# Patient Record
Sex: MALE | Employment: OTHER | URBAN - METROPOLITAN AREA
[De-identification: names, ages, dates, MRNs, and addresses within clinical notes are randomized per-mention and may not be internally consistent; named-entity substitution may affect disease eponyms.]

---

## 2017-01-01 ENCOUNTER — APPOINTMENT (OUTPATIENT)
Dept: GENERAL RADIOLOGY | Age: 82
DRG: 871 | End: 2017-01-01
Attending: EMERGENCY MEDICINE
Payer: MEDICARE

## 2017-01-01 ENCOUNTER — APPOINTMENT (OUTPATIENT)
Dept: GENERAL RADIOLOGY | Age: 82
DRG: 871 | End: 2017-01-01
Attending: SURGERY
Payer: MEDICARE

## 2017-01-01 ENCOUNTER — HOSPITAL ENCOUNTER (EMERGENCY)
Age: 82
Discharge: HOME | DRG: 291 | End: 2017-11-24
Attending: EMERGENCY MEDICINE
Payer: MEDICARE

## 2017-01-01 ENCOUNTER — APPOINTMENT (OUTPATIENT)
Dept: CT IMAGING | Age: 82
End: 2017-01-01
Attending: EMERGENCY MEDICINE
Payer: MEDICARE

## 2017-01-01 ENCOUNTER — APPOINTMENT (OUTPATIENT)
Dept: GENERAL RADIOLOGY | Age: 82
DRG: 871 | End: 2017-01-01
Attending: FAMILY MEDICINE
Payer: MEDICARE

## 2017-01-01 ENCOUNTER — APPOINTMENT (OUTPATIENT)
Dept: ULTRASOUND IMAGING | Age: 82
DRG: 291 | End: 2017-01-01
Attending: PHYSICIAN ASSISTANT
Payer: MEDICARE

## 2017-01-01 ENCOUNTER — APPOINTMENT (OUTPATIENT)
Dept: GENERAL RADIOLOGY | Age: 82
DRG: 304 | End: 2017-01-01
Attending: EMERGENCY MEDICINE
Payer: MEDICARE

## 2017-01-01 ENCOUNTER — APPOINTMENT (OUTPATIENT)
Dept: GENERAL RADIOLOGY | Age: 82
DRG: 291 | End: 2017-01-01
Attending: EMERGENCY MEDICINE
Payer: MEDICARE

## 2017-01-01 ENCOUNTER — APPOINTMENT (OUTPATIENT)
Dept: CT IMAGING | Age: 82
DRG: 291 | End: 2017-01-01
Attending: PHYSICIAN ASSISTANT
Payer: MEDICARE

## 2017-01-01 ENCOUNTER — HOSPITAL ENCOUNTER (EMERGENCY)
Age: 82
Discharge: OTHER INSTITUTION | End: 2017-11-18
Attending: EMERGENCY MEDICINE | Admitting: EMERGENCY MEDICINE
Payer: MEDICARE

## 2017-01-01 ENCOUNTER — TELEPHONE (OUTPATIENT)
Dept: FAMILY MEDICINE CLINIC | Age: 82
End: 2017-01-01

## 2017-01-01 ENCOUNTER — HOSPITAL ENCOUNTER (INPATIENT)
Age: 82
LOS: 1 days | Discharge: HOME | DRG: 304 | End: 2017-04-27
Attending: EMERGENCY MEDICINE | Admitting: FAMILY MEDICINE
Payer: MEDICARE

## 2017-01-01 ENCOUNTER — APPOINTMENT (OUTPATIENT)
Dept: GENERAL RADIOLOGY | Age: 82
DRG: 871 | End: 2017-01-01
Attending: INTERNAL MEDICINE
Payer: MEDICARE

## 2017-01-01 ENCOUNTER — APPOINTMENT (OUTPATIENT)
Dept: CT IMAGING | Age: 82
DRG: 871 | End: 2017-01-01
Attending: PHYSICIAN ASSISTANT
Payer: MEDICARE

## 2017-01-01 ENCOUNTER — HOSPITAL ENCOUNTER (INPATIENT)
Age: 82
LOS: 3 days | DRG: 871 | End: 2017-12-17
Attending: EMERGENCY MEDICINE | Admitting: FAMILY MEDICINE
Payer: MEDICARE

## 2017-01-01 ENCOUNTER — APPOINTMENT (OUTPATIENT)
Dept: GENERAL RADIOLOGY | Age: 82
End: 2017-01-01
Attending: EMERGENCY MEDICINE
Payer: MEDICARE

## 2017-01-01 ENCOUNTER — HOSPITAL ENCOUNTER (INPATIENT)
Age: 82
LOS: 6 days | Discharge: NURSING, REHAB OR LONG-TERM CARE | DRG: 291 | End: 2017-12-01
Attending: EMERGENCY MEDICINE | Admitting: FAMILY MEDICINE
Payer: MEDICARE

## 2017-01-01 ENCOUNTER — APPOINTMENT (OUTPATIENT)
Dept: CT IMAGING | Age: 82
DRG: 291 | End: 2017-01-01
Attending: EMERGENCY MEDICINE
Payer: MEDICARE

## 2017-01-01 VITALS
DIASTOLIC BLOOD PRESSURE: 138 MMHG | HEART RATE: 90 BPM | RESPIRATION RATE: 18 BRPM | HEIGHT: 65 IN | WEIGHT: 100 LBS | SYSTOLIC BLOOD PRESSURE: 179 MMHG | TEMPERATURE: 98.1 F | BODY MASS INDEX: 16.66 KG/M2 | OXYGEN SATURATION: 100 %

## 2017-01-01 VITALS
TEMPERATURE: 97.3 F | SYSTOLIC BLOOD PRESSURE: 123 MMHG | BODY MASS INDEX: 18.83 KG/M2 | DIASTOLIC BLOOD PRESSURE: 69 MMHG | OXYGEN SATURATION: 99 % | HEIGHT: 65 IN | RESPIRATION RATE: 24 BRPM | WEIGHT: 113 LBS | HEART RATE: 60 BPM

## 2017-01-01 VITALS
DIASTOLIC BLOOD PRESSURE: 22 MMHG | RESPIRATION RATE: 15 BRPM | OXYGEN SATURATION: 98 % | HEART RATE: 58 BPM | HEIGHT: 67 IN | SYSTOLIC BLOOD PRESSURE: 35 MMHG | BODY MASS INDEX: 20.35 KG/M2 | WEIGHT: 129.63 LBS | TEMPERATURE: 90.1 F

## 2017-01-01 VITALS
DIASTOLIC BLOOD PRESSURE: 82 MMHG | RESPIRATION RATE: 18 BRPM | HEART RATE: 69 BPM | SYSTOLIC BLOOD PRESSURE: 148 MMHG | HEIGHT: 65 IN | BODY MASS INDEX: 17.49 KG/M2 | WEIGHT: 105 LBS | TEMPERATURE: 98.2 F | OXYGEN SATURATION: 99 %

## 2017-01-01 VITALS
HEART RATE: 99 BPM | OXYGEN SATURATION: 99 % | HEIGHT: 65 IN | WEIGHT: 107.5 LBS | BODY MASS INDEX: 17.91 KG/M2 | RESPIRATION RATE: 18 BRPM | SYSTOLIC BLOOD PRESSURE: 146 MMHG | DIASTOLIC BLOOD PRESSURE: 96 MMHG | TEMPERATURE: 98 F

## 2017-01-01 DIAGNOSIS — R60.0 BILATERAL EDEMA OF LOWER EXTREMITY: Primary | ICD-10-CM

## 2017-01-01 DIAGNOSIS — T68.XXXA HYPOTHERMIA, INITIAL ENCOUNTER: ICD-10-CM

## 2017-01-01 DIAGNOSIS — R65.21 SEPTIC SHOCK (HCC): Primary | ICD-10-CM

## 2017-01-01 DIAGNOSIS — J18.9 PNEUMONIA OF BOTH LOWER LOBES DUE TO INFECTIOUS ORGANISM: ICD-10-CM

## 2017-01-01 DIAGNOSIS — R07.9 CHEST PAIN, UNSPECIFIED TYPE: Primary | ICD-10-CM

## 2017-01-01 DIAGNOSIS — R06.02 SOB (SHORTNESS OF BREATH): ICD-10-CM

## 2017-01-01 DIAGNOSIS — D49.0 HEPATIC TUMOR: Primary | ICD-10-CM

## 2017-01-01 DIAGNOSIS — D64.9 ANEMIA, UNSPECIFIED TYPE: ICD-10-CM

## 2017-01-01 DIAGNOSIS — A41.9 SEPTIC SHOCK (HCC): Primary | ICD-10-CM

## 2017-01-01 DIAGNOSIS — I21.09 ST ELEVATION MYOCARDIAL INFARCTION (STEMI) OF ANTERIOR WALL (HCC): ICD-10-CM

## 2017-01-01 DIAGNOSIS — R62.7 FAILURE TO THRIVE IN ADULT: ICD-10-CM

## 2017-01-01 DIAGNOSIS — R51.9 NONINTRACTABLE HEADACHE, UNSPECIFIED CHRONICITY PATTERN, UNSPECIFIED HEADACHE TYPE: ICD-10-CM

## 2017-01-01 DIAGNOSIS — R53.1 WEAKNESS: ICD-10-CM

## 2017-01-01 DIAGNOSIS — S36.119A HEPATIC TRAUMA, INITIAL ENCOUNTER: ICD-10-CM

## 2017-01-01 DIAGNOSIS — R77.8 TROPONIN I ABOVE REFERENCE RANGE: ICD-10-CM

## 2017-01-01 DIAGNOSIS — R16.0 LIVER MASS: ICD-10-CM

## 2017-01-01 DIAGNOSIS — R10.13 ABDOMINAL PAIN, EPIGASTRIC: Primary | ICD-10-CM

## 2017-01-01 LAB
25(OH)D3 SERPL-MCNC: 7.6 NG/ML (ref 30–100)
ABO + RH BLD: NORMAL
AFP-TM SERPL-MCNC: 1.9 NG/ML (ref 0–8.3)
ALBUMIN SERPL BCP-MCNC: 3.4 G/DL (ref 3.4–5)
ALBUMIN SERPL-MCNC: 1.6 G/DL (ref 3.4–5)
ALBUMIN SERPL-MCNC: 1.7 G/DL (ref 3.4–5)
ALBUMIN SERPL-MCNC: 1.9 G/DL (ref 3.4–5)
ALBUMIN SERPL-MCNC: 2.1 G/DL (ref 3.4–5)
ALBUMIN SERPL-MCNC: 2.1 G/DL (ref 3.4–5)
ALBUMIN SERPL-MCNC: 2.2 G/DL (ref 3.4–5)
ALBUMIN SERPL-MCNC: 2.2 G/DL (ref 3.4–5)
ALBUMIN SERPL-MCNC: 2.3 G/DL (ref 3.4–5)
ALBUMIN SERPL-MCNC: 2.5 G/DL (ref 3.4–5)
ALBUMIN SERPL-MCNC: 2.7 G/DL (ref 3.4–5)
ALBUMIN SERPL-MCNC: 2.9 G/DL (ref 3.4–5)
ALBUMIN/GLOB SERPL: 0.7 {RATIO} (ref 0.8–1.7)
ALBUMIN/GLOB SERPL: 0.8 {RATIO} (ref 0.8–1.7)
ALBUMIN/GLOB SERPL: 0.9 {RATIO} (ref 0.8–1.7)
ALP SERPL-CCNC: 1121 U/L (ref 45–117)
ALP SERPL-CCNC: 207 U/L (ref 45–117)
ALP SERPL-CCNC: 344 U/L (ref 45–117)
ALP SERPL-CCNC: 421 U/L (ref 45–117)
ALP SERPL-CCNC: 431 U/L (ref 45–117)
ALP SERPL-CCNC: 467 U/L (ref 45–117)
ALP SERPL-CCNC: 533 U/L (ref 45–117)
ALP SERPL-CCNC: 681 U/L (ref 45–117)
ALP SERPL-CCNC: 805 U/L (ref 45–117)
ALP SERPL-CCNC: 816 U/L (ref 45–117)
ALP SERPL-CCNC: 949 U/L (ref 45–117)
ALP SERPL-CCNC: 962 U/L (ref 45–117)
ALT SERPL-CCNC: 105 U/L (ref 16–61)
ALT SERPL-CCNC: 116 U/L (ref 16–61)
ALT SERPL-CCNC: 139 U/L (ref 16–61)
ALT SERPL-CCNC: 147 U/L (ref 16–61)
ALT SERPL-CCNC: 151 U/L (ref 16–61)
ALT SERPL-CCNC: 180 U/L (ref 16–61)
ALT SERPL-CCNC: 25 U/L (ref 16–61)
ALT SERPL-CCNC: 78 U/L (ref 16–61)
ALT SERPL-CCNC: 89 U/L (ref 16–61)
ALT SERPL-CCNC: 90 U/L (ref 16–61)
ALT SERPL-CCNC: 93 U/L (ref 16–61)
ALT SERPL-CCNC: 95 U/L (ref 16–61)
AMMONIA PLAS-SCNC: 12 UMOL/L (ref 11–32)
AMMONIA PLAS-SCNC: 29 UMOL/L (ref 11–32)
AMMONIA PLAS-SCNC: 61 UMOL/L (ref 11–32)
AMORPH CRY URNS QL MICRO: ABNORMAL
AMPHET UR QL SCN: NEGATIVE
ANION GAP BLD CALC-SCNC: 7 MMOL/L (ref 3–18)
ANION GAP BLD CALC-SCNC: 8 MMOL/L (ref 3–18)
ANION GAP BLD CALC-SCNC: 8 MMOL/L (ref 3–18)
ANION GAP SERPL CALC-SCNC: 11 MMOL/L (ref 3–18)
ANION GAP SERPL CALC-SCNC: 16 MMOL/L (ref 3–18)
ANION GAP SERPL CALC-SCNC: 18 MMOL/L (ref 3–18)
ANION GAP SERPL CALC-SCNC: 18 MMOL/L (ref 3–18)
ANION GAP SERPL CALC-SCNC: 19 MMOL/L (ref 3–18)
ANION GAP SERPL CALC-SCNC: 19 MMOL/L (ref 3–18)
ANION GAP SERPL CALC-SCNC: 21 MMOL/L (ref 3–18)
ANION GAP SERPL CALC-SCNC: 9 MMOL/L (ref 3–18)
APPEARANCE FLD: ABNORMAL
APPEARANCE UR: ABNORMAL
APPEARANCE UR: CLEAR
APTT PPP: 28.7 SEC (ref 23–36.4)
APTT PPP: 36.3 SEC (ref 23–36.4)
APTT PPP: 37.3 SEC (ref 23–36.4)
AST SERPL W P-5'-P-CCNC: 24 U/L (ref 15–37)
AST SERPL-CCNC: 147 U/L (ref 15–37)
AST SERPL-CCNC: 197 U/L (ref 15–37)
AST SERPL-CCNC: 298 U/L (ref 15–37)
AST SERPL-CCNC: 313 U/L (ref 15–37)
AST SERPL-CCNC: 359 U/L (ref 15–37)
AST SERPL-CCNC: 59 U/L (ref 15–37)
AST SERPL-CCNC: 67 U/L (ref 15–37)
AST SERPL-CCNC: 86 U/L (ref 15–37)
AST SERPL-CCNC: 87 U/L (ref 15–37)
AST SERPL-CCNC: 96 U/L (ref 15–37)
AST SERPL-CCNC: 99 U/L (ref 15–37)
ATRIAL RATE: 55 BPM
ATRIAL RATE: 56 BPM
ATRIAL RATE: 61 BPM
ATRIAL RATE: 65 BPM
ATRIAL RATE: 76 BPM
ATRIAL RATE: 91 BPM
ATTENDING PHYSICIAN, CST07: NORMAL
BACTERIA SPEC CULT: ABNORMAL
BACTERIA SPEC CULT: NORMAL
BACTERIA URNS QL MICRO: ABNORMAL /HPF
BACTERIA URNS QL MICRO: ABNORMAL /HPF
BARBITURATES UR QL SCN: NEGATIVE
BASOPHILS # BLD: 0 K/UL (ref 0–0.06)
BASOPHILS # BLD: 0 K/UL (ref 0–0.1)
BASOPHILS NFR BLD: 0 % (ref 0–2)
BASOPHILS NFR BLD: 0 % (ref 0–3)
BENZODIAZ UR QL: NEGATIVE
BILIRUB SERPL-MCNC: 0.7 MG/DL (ref 0.2–1)
BILIRUB SERPL-MCNC: 1.8 MG/DL (ref 0.2–1)
BILIRUB SERPL-MCNC: 1.9 MG/DL (ref 0.2–1)
BILIRUB SERPL-MCNC: 2.1 MG/DL (ref 0.2–1)
BILIRUB SERPL-MCNC: 2.1 MG/DL (ref 0.2–1)
BILIRUB SERPL-MCNC: 2.3 MG/DL (ref 0.2–1)
BILIRUB SERPL-MCNC: 2.4 MG/DL (ref 0.2–1)
BILIRUB SERPL-MCNC: 2.6 MG/DL (ref 0.2–1)
BILIRUB SERPL-MCNC: 2.7 MG/DL (ref 0.2–1)
BILIRUB SERPL-MCNC: 3.3 MG/DL (ref 0.2–1)
BILIRUB UR QL: NEGATIVE
BILIRUB UR QL: NEGATIVE
BLOOD GROUP ANTIBODIES SERPL: NORMAL
BNP SERPL-MCNC: 4137 PG/ML (ref 0–1800)
BNP SERPL-MCNC: 6079 PG/ML (ref 0–1800)
BNP SERPL-MCNC: ABNORMAL PG/ML (ref 0–1800)
BNP SERPL-MCNC: ABNORMAL PG/ML (ref 0–1800)
BUN SERPL-MCNC: 16 MG/DL (ref 7–18)
BUN SERPL-MCNC: 179 MG/DL (ref 7–18)
BUN SERPL-MCNC: 188 MG/DL (ref 7–18)
BUN SERPL-MCNC: 194 MG/DL (ref 7–18)
BUN SERPL-MCNC: 194 MG/DL (ref 7–18)
BUN SERPL-MCNC: 198 MG/DL (ref 7–18)
BUN SERPL-MCNC: 20 MG/DL (ref 7–18)
BUN SERPL-MCNC: 210 MG/DL (ref 7–18)
BUN SERPL-MCNC: 28 MG/DL (ref 7–18)
BUN SERPL-MCNC: 69 MG/DL (ref 7–18)
BUN SERPL-MCNC: 74 MG/DL (ref 7–18)
BUN SERPL-MCNC: 82 MG/DL (ref 7–18)
BUN SERPL-MCNC: 83 MG/DL (ref 7–18)
BUN SERPL-MCNC: 83 MG/DL (ref 7–18)
BUN SERPL-MCNC: 85 MG/DL (ref 7–18)
BUN/CREAT SERPL: 10 (ref 12–20)
BUN/CREAT SERPL: 13 (ref 12–20)
BUN/CREAT SERPL: 17 (ref 12–20)
BUN/CREAT SERPL: 31 (ref 12–20)
BUN/CREAT SERPL: 35 (ref 12–20)
BUN/CREAT SERPL: 40 (ref 12–20)
BUN/CREAT SERPL: 43 (ref 12–20)
BUN/CREAT SERPL: 44 (ref 12–20)
BUN/CREAT SERPL: 46 (ref 12–20)
BUN/CREAT SERPL: 46 (ref 12–20)
BUN/CREAT SERPL: 47 (ref 12–20)
BUN/CREAT SERPL: 48 (ref 12–20)
BUN/CREAT SERPL: 50 (ref 12–20)
CA-I SERPL-SCNC: 0.94 MMOL/L (ref 1.12–1.32)
CA-I SERPL-SCNC: 1.02 MMOL/L (ref 1.12–1.32)
CALCIUM SERPL-MCNC: 5.5 MG/DL (ref 8.5–10.1)
CALCIUM SERPL-MCNC: 5.9 MG/DL (ref 8.5–10.1)
CALCIUM SERPL-MCNC: 6 MG/DL (ref 8.5–10.1)
CALCIUM SERPL-MCNC: 6.1 MG/DL (ref 8.5–10.1)
CALCIUM SERPL-MCNC: 6.2 MG/DL (ref 8.5–10.1)
CALCIUM SERPL-MCNC: 6.6 MG/DL (ref 8.5–10.1)
CALCIUM SERPL-MCNC: 7.1 MG/DL (ref 8.5–10.1)
CALCIUM SERPL-MCNC: 7.6 MG/DL (ref 8.5–10.1)
CALCIUM SERPL-MCNC: 7.7 MG/DL (ref 8.5–10.1)
CALCIUM SERPL-MCNC: 7.9 MG/DL (ref 8.5–10.1)
CALCIUM SERPL-MCNC: 8 MG/DL (ref 8.5–10.1)
CALCIUM SERPL-MCNC: 8.2 MG/DL (ref 8.5–10.1)
CALCIUM SERPL-MCNC: 8.5 MG/DL (ref 8.5–10.1)
CALCIUM SERPL-MCNC: 8.6 MG/DL (ref 8.5–10.1)
CALCIUM SERPL-MCNC: 8.9 MG/DL (ref 8.5–10.1)
CALCIUM SERPL-MCNC: 9.1 MG/DL (ref 8.5–10.1)
CALCULATED P AXIS, ECG09: 66 DEGREES
CALCULATED P AXIS, ECG09: 74 DEGREES
CALCULATED P AXIS, ECG09: 74 DEGREES
CALCULATED P AXIS, ECG09: 76 DEGREES
CALCULATED P AXIS, ECG09: 81 DEGREES
CALCULATED P AXIS, ECG09: 84 DEGREES
CALCULATED R AXIS, ECG10: -14 DEGREES
CALCULATED R AXIS, ECG10: -28 DEGREES
CALCULATED R AXIS, ECG10: -3 DEGREES
CALCULATED R AXIS, ECG10: -4 DEGREES
CALCULATED R AXIS, ECG10: 2 DEGREES
CALCULATED R AXIS, ECG10: 2 DEGREES
CALCULATED T AXIS, ECG11: -145 DEGREES
CALCULATED T AXIS, ECG11: -2 DEGREES
CALCULATED T AXIS, ECG11: 105 DEGREES
CALCULATED T AXIS, ECG11: 110 DEGREES
CALCULATED T AXIS, ECG11: 85 DEGREES
CALCULATED T AXIS, ECG11: 86 DEGREES
CANCER AG19-9 SERPL-ACNC: 5 U/ML (ref 0–35)
CANNABINOIDS UR QL SCN: NEGATIVE
CHLORIDE SERPL-SCNC: 103 MMOL/L (ref 100–108)
CHLORIDE SERPL-SCNC: 104 MMOL/L (ref 100–108)
CHLORIDE SERPL-SCNC: 106 MMOL/L (ref 100–108)
CHLORIDE SERPL-SCNC: 107 MMOL/L (ref 100–108)
CHLORIDE SERPL-SCNC: 108 MMOL/L (ref 100–108)
CHLORIDE SERPL-SCNC: 108 MMOL/L (ref 100–108)
CHLORIDE SERPL-SCNC: 109 MMOL/L (ref 100–108)
CHLORIDE SERPL-SCNC: 110 MMOL/L (ref 100–108)
CHOLEST SERPL-MCNC: 214 MG/DL
CK MB CFR SERPL CALC: 1.9 % (ref 0–4)
CK MB CFR SERPL CALC: 11.5 % (ref 0–4)
CK MB CFR SERPL CALC: 12.1 % (ref 0–4)
CK MB CFR SERPL CALC: 2.4 % (ref 0–4)
CK MB CFR SERPL CALC: 2.5 % (ref 0–4)
CK MB CFR SERPL CALC: 3.3 % (ref 0–4)
CK MB CFR SERPL CALC: 3.5 % (ref 0–4)
CK MB CFR SERPL CALC: 3.7 % (ref 0–4)
CK MB CFR SERPL CALC: 4 % (ref 0–4)
CK MB CFR SERPL CALC: 9.8 % (ref 0–4)
CK MB SERPL-MCNC: 1.7 NG/ML (ref 5–25)
CK MB SERPL-MCNC: 1.9 NG/ML (ref 5–25)
CK MB SERPL-MCNC: 12.9 NG/ML (ref 5–25)
CK MB SERPL-MCNC: 13.7 NG/ML (ref 5–25)
CK MB SERPL-MCNC: 14.6 NG/ML (ref 5–25)
CK MB SERPL-MCNC: 4.6 NG/ML (ref 5–25)
CK MB SERPL-MCNC: 40.7 NG/ML (ref 5–25)
CK MB SERPL-MCNC: 56.5 NG/ML (ref 5–25)
CK MB SERPL-MCNC: 61.3 NG/ML (ref 5–25)
CK MB SERPL-MCNC: 72.8 NG/ML (ref 5–25)
CK SERPL-CCNC: 1095 U/L (ref 39–308)
CK SERPL-CCNC: 113 U/L (ref 39–308)
CK SERPL-CCNC: 115 U/L (ref 39–308)
CK SERPL-CCNC: 127 U/L (ref 39–308)
CK SERPL-CCNC: 131 U/L (ref 39–308)
CK SERPL-CCNC: 1729 U/L (ref 39–308)
CK SERPL-CCNC: 2203 U/L (ref 39–308)
CK SERPL-CCNC: 2299 U/L (ref 39–308)
CK SERPL-CCNC: 79 U/L (ref 39–308)
CK SERPL-CCNC: 91 U/L (ref 39–308)
CO2 SERPL-SCNC: 13 MMOL/L (ref 21–32)
CO2 SERPL-SCNC: 14 MMOL/L (ref 21–32)
CO2 SERPL-SCNC: 16 MMOL/L (ref 21–32)
CO2 SERPL-SCNC: 16 MMOL/L (ref 21–32)
CO2 SERPL-SCNC: 21 MMOL/L (ref 21–32)
CO2 SERPL-SCNC: 22 MMOL/L (ref 21–32)
CO2 SERPL-SCNC: 22 MMOL/L (ref 21–32)
CO2 SERPL-SCNC: 23 MMOL/L (ref 21–32)
CO2 SERPL-SCNC: 24 MMOL/L (ref 21–32)
CO2 SERPL-SCNC: 25 MMOL/L (ref 21–32)
CO2 SERPL-SCNC: 26 MMOL/L (ref 21–32)
CO2 SERPL-SCNC: 28 MMOL/L (ref 21–32)
CO2 SERPL-SCNC: 29 MMOL/L (ref 21–32)
COCAINE UR QL SCN: NEGATIVE
COLOR FLD: ABNORMAL
COLOR UR: ABNORMAL
COLOR UR: YELLOW
CREAT SERPL-MCNC: 1.54 MG/DL (ref 0.6–1.3)
CREAT SERPL-MCNC: 1.59 MG/DL (ref 0.6–1.3)
CREAT SERPL-MCNC: 1.66 MG/DL (ref 0.6–1.3)
CREAT SERPL-MCNC: 1.74 MG/DL (ref 0.6–1.3)
CREAT SERPL-MCNC: 1.88 MG/DL (ref 0.6–1.3)
CREAT SERPL-MCNC: 2 MG/DL (ref 0.6–1.3)
CREAT SERPL-MCNC: 2.03 MG/DL (ref 0.6–1.3)
CREAT SERPL-MCNC: 2.12 MG/DL (ref 0.6–1.3)
CREAT SERPL-MCNC: 2.23 MG/DL (ref 0.6–1.3)
CREAT SERPL-MCNC: 3.87 MG/DL (ref 0.6–1.3)
CREAT SERPL-MCNC: 3.87 MG/DL (ref 0.6–1.3)
CREAT SERPL-MCNC: 3.9 MG/DL (ref 0.6–1.3)
CREAT SERPL-MCNC: 3.95 MG/DL (ref 0.6–1.3)
CREAT SERPL-MCNC: 4.05 MG/DL (ref 0.6–1.3)
CREAT SERPL-MCNC: 4.45 MG/DL (ref 0.6–1.3)
DIAGNOSIS, 93000: NORMAL
DIFFERENTIAL METHOD BLD: ABNORMAL
DUKE TM SCORE RESULT, CST14: NORMAL
DUKE TREADMILL SCORE, CST13: NORMAL
ECG INTERP BEFORE EX, CST11: NORMAL
ECG INTERP DURING EX, CST12: NORMAL
EOSINOPHIL # BLD: 0 K/UL (ref 0–0.4)
EOSINOPHIL NFR BLD: 0 % (ref 0–5)
EOSINOPHIL NFR FLD MANUAL: 0 %
EPITH CASTS URNS QL MICRO: ABNORMAL /LPF (ref 0–5)
EPITH CASTS URNS QL MICRO: ABNORMAL /LPF (ref 0–5)
ERYTHROCYTE [DISTWIDTH] IN BLOOD BY AUTOMATED COUNT: 13.8 % (ref 11.6–14.5)
ERYTHROCYTE [DISTWIDTH] IN BLOOD BY AUTOMATED COUNT: 15.1 % (ref 11.6–14.5)
ERYTHROCYTE [DISTWIDTH] IN BLOOD BY AUTOMATED COUNT: 15.3 % (ref 11.6–14.5)
ERYTHROCYTE [DISTWIDTH] IN BLOOD BY AUTOMATED COUNT: 15.6 % (ref 11.6–14.5)
ERYTHROCYTE [DISTWIDTH] IN BLOOD BY AUTOMATED COUNT: 17.7 % (ref 11.6–14.5)
ERYTHROCYTE [DISTWIDTH] IN BLOOD BY AUTOMATED COUNT: 19 % (ref 11.6–14.5)
ERYTHROCYTE [DISTWIDTH] IN BLOOD BY AUTOMATED COUNT: 19.1 % (ref 11.6–14.5)
ERYTHROCYTE [DISTWIDTH] IN BLOOD BY AUTOMATED COUNT: 19.7 % (ref 11.6–14.5)
ERYTHROCYTE [DISTWIDTH] IN BLOOD BY AUTOMATED COUNT: 20 % (ref 11.6–14.5)
EST. AVERAGE GLUCOSE BLD GHB EST-MCNC: 123 MG/DL
EST. AVERAGE GLUCOSE BLD GHB EST-MCNC: 128 MG/DL
FOLATE SERPL-MCNC: 6 NG/ML (ref 3.1–17.5)
FOLATE SERPL-MCNC: 7.1 NG/ML (ref 3.1–17.5)
FUNCTIONAL CAPACITY, CST17: NORMAL
GLOBULIN SER CALC-MCNC: 1.9 G/DL (ref 2–4)
GLOBULIN SER CALC-MCNC: 2.2 G/DL (ref 2–4)
GLOBULIN SER CALC-MCNC: 2.3 G/DL (ref 2–4)
GLOBULIN SER CALC-MCNC: 2.6 G/DL (ref 2–4)
GLOBULIN SER CALC-MCNC: 2.7 G/DL (ref 2–4)
GLOBULIN SER CALC-MCNC: 3 G/DL (ref 2–4)
GLOBULIN SER CALC-MCNC: 3.1 G/DL (ref 2–4)
GLOBULIN SER CALC-MCNC: 3.2 G/DL (ref 2–4)
GLOBULIN SER CALC-MCNC: 3.2 G/DL (ref 2–4)
GLOBULIN SER CALC-MCNC: 3.4 G/DL (ref 2–4)
GLOBULIN SER CALC-MCNC: 4.4 G/DL (ref 2–4)
GLOBULIN SER CALC-MCNC: 5 G/DL (ref 2–4)
GLUCOSE BLD STRIP.AUTO-MCNC: 101 MG/DL (ref 70–110)
GLUCOSE BLD STRIP.AUTO-MCNC: 103 MG/DL (ref 70–110)
GLUCOSE BLD STRIP.AUTO-MCNC: 107 MG/DL (ref 70–110)
GLUCOSE BLD STRIP.AUTO-MCNC: 110 MG/DL (ref 70–110)
GLUCOSE BLD STRIP.AUTO-MCNC: 114 MG/DL (ref 70–110)
GLUCOSE BLD STRIP.AUTO-MCNC: 115 MG/DL (ref 70–110)
GLUCOSE BLD STRIP.AUTO-MCNC: 117 MG/DL (ref 70–110)
GLUCOSE BLD STRIP.AUTO-MCNC: 118 MG/DL (ref 70–110)
GLUCOSE BLD STRIP.AUTO-MCNC: 119 MG/DL (ref 70–110)
GLUCOSE BLD STRIP.AUTO-MCNC: 120 MG/DL (ref 70–110)
GLUCOSE BLD STRIP.AUTO-MCNC: 120 MG/DL (ref 70–110)
GLUCOSE BLD STRIP.AUTO-MCNC: 121 MG/DL (ref 70–110)
GLUCOSE BLD STRIP.AUTO-MCNC: 136 MG/DL (ref 70–110)
GLUCOSE BLD STRIP.AUTO-MCNC: 136 MG/DL (ref 70–110)
GLUCOSE BLD STRIP.AUTO-MCNC: 149 MG/DL (ref 70–110)
GLUCOSE BLD STRIP.AUTO-MCNC: 150 MG/DL (ref 70–110)
GLUCOSE BLD STRIP.AUTO-MCNC: 153 MG/DL (ref 70–110)
GLUCOSE BLD STRIP.AUTO-MCNC: 162 MG/DL (ref 70–110)
GLUCOSE BLD STRIP.AUTO-MCNC: 167 MG/DL (ref 70–110)
GLUCOSE BLD STRIP.AUTO-MCNC: 168 MG/DL (ref 70–110)
GLUCOSE BLD STRIP.AUTO-MCNC: 170 MG/DL (ref 70–110)
GLUCOSE BLD STRIP.AUTO-MCNC: 178 MG/DL (ref 70–110)
GLUCOSE BLD STRIP.AUTO-MCNC: 178 MG/DL (ref 70–110)
GLUCOSE BLD STRIP.AUTO-MCNC: 180 MG/DL (ref 70–110)
GLUCOSE BLD STRIP.AUTO-MCNC: 181 MG/DL (ref 70–110)
GLUCOSE BLD STRIP.AUTO-MCNC: 183 MG/DL (ref 70–110)
GLUCOSE BLD STRIP.AUTO-MCNC: 192 MG/DL (ref 70–110)
GLUCOSE BLD STRIP.AUTO-MCNC: 216 MG/DL (ref 70–110)
GLUCOSE BLD STRIP.AUTO-MCNC: 68 MG/DL (ref 70–110)
GLUCOSE BLD STRIP.AUTO-MCNC: 68 MG/DL (ref 70–110)
GLUCOSE BLD STRIP.AUTO-MCNC: 89 MG/DL (ref 70–110)
GLUCOSE BLD STRIP.AUTO-MCNC: 92 MG/DL (ref 70–110)
GLUCOSE BLD STRIP.AUTO-MCNC: 94 MG/DL (ref 70–110)
GLUCOSE BLD STRIP.AUTO-MCNC: 96 MG/DL (ref 70–110)
GLUCOSE SERPL-MCNC: 101 MG/DL (ref 74–99)
GLUCOSE SERPL-MCNC: 104 MG/DL (ref 74–99)
GLUCOSE SERPL-MCNC: 105 MG/DL (ref 74–99)
GLUCOSE SERPL-MCNC: 111 MG/DL (ref 74–99)
GLUCOSE SERPL-MCNC: 114 MG/DL (ref 74–99)
GLUCOSE SERPL-MCNC: 115 MG/DL (ref 74–99)
GLUCOSE SERPL-MCNC: 117 MG/DL (ref 74–99)
GLUCOSE SERPL-MCNC: 122 MG/DL (ref 74–99)
GLUCOSE SERPL-MCNC: 123 MG/DL (ref 74–99)
GLUCOSE SERPL-MCNC: 131 MG/DL (ref 74–99)
GLUCOSE SERPL-MCNC: 154 MG/DL (ref 74–99)
GLUCOSE SERPL-MCNC: 39 MG/DL (ref 74–99)
GLUCOSE SERPL-MCNC: 84 MG/DL (ref 74–99)
GLUCOSE SERPL-MCNC: 90 MG/DL (ref 74–99)
GLUCOSE SERPL-MCNC: 96 MG/DL (ref 74–99)
GLUCOSE UR STRIP.AUTO-MCNC: NEGATIVE MG/DL
GLUCOSE UR STRIP.AUTO-MCNC: NEGATIVE MG/DL
GRAM STN SPEC: NORMAL
GRAM STN SPEC: NORMAL
HBA1C MFR BLD: 5.9 % (ref 4.2–5.6)
HBA1C MFR BLD: 6.1 % (ref 4.2–5.6)
HBV CORE AB SERPL QL IA: POSITIVE
HBV E AB SERPL QL IA: POSITIVE
HBV E AG SERPL QL IA: NEGATIVE
HBV GENOTYPE: NORMAL
HBV IU/ML, 551649: 50 IU/ML
HBV SURFACE AB SER QL IA: POSITIVE
HBV SURFACE AB SERPL IA-ACNC: 18.59 MIU/ML
HBV SURFACE AG SER QL: 4.53 INDEX
HBV SURFACE AG SER QL: 6.51 INDEX
HBV SURFACE AG SER QL: ABNORMAL
HBV SURFACE AG SER QL: ABNORMAL
HBV SURFACE AG SERPL QL CFM: ABNORMAL
HBV SURFACE AG SERPL QL CFM: ABNORMAL
HCT VFR BLD AUTO: 15.8 % (ref 36–48)
HCT VFR BLD AUTO: 18.2 % (ref 36–48)
HCT VFR BLD AUTO: 20.1 % (ref 36–48)
HCT VFR BLD AUTO: 20.9 % (ref 36–48)
HCT VFR BLD AUTO: 25.1 % (ref 36–48)
HCT VFR BLD AUTO: 25.7 % (ref 36–48)
HCT VFR BLD AUTO: 29.1 % (ref 36–48)
HCT VFR BLD AUTO: 29.4 % (ref 36–48)
HCT VFR BLD AUTO: 29.4 % (ref 36–48)
HCT VFR BLD AUTO: 30.9 % (ref 36–48)
HCT VFR BLD AUTO: 39.2 % (ref 36–48)
HCT VFR BLD AUTO: 41.4 % (ref 36–48)
HCV AB SER IA-ACNC: 0.82 INDEX
HCV AB SERPL QL IA: ABNORMAL
HCV COMMENT,HCGAC: ABNORMAL
HCV GENOTYPE: NORMAL
HCV RNA SERPL NAA+PROBE-ACNC: NORMAL IU/ML
HCV RNA SERPL NAA+PROBE-LOG IU: NORMAL LOG10 IU/ML
HDLC SERPL-MCNC: 43 MG/DL (ref 40–60)
HDLC SERPL: 5 {RATIO} (ref 0–5)
HDSCOM,HDSCOM: NORMAL
HEP B SURF AG CONF C,HBAGCC: ABNORMAL
HEP B SURF AG CONF C,HBAGCC: ABNORMAL
HEP BS AB COMMENT,HBSAC: NORMAL
HGB BLD-MCNC: 10.5 G/DL (ref 13–16)
HGB BLD-MCNC: 10.6 G/DL (ref 13–16)
HGB BLD-MCNC: 10.7 G/DL (ref 13–16)
HGB BLD-MCNC: 13.7 G/DL (ref 13–16)
HGB BLD-MCNC: 13.9 G/DL (ref 13–16)
HGB BLD-MCNC: 5.4 G/DL (ref 13–16)
HGB BLD-MCNC: 6.3 G/DL (ref 13–16)
HGB BLD-MCNC: 7 G/DL (ref 13–16)
HGB BLD-MCNC: 7.6 G/DL (ref 13–16)
HGB BLD-MCNC: 8.7 G/DL (ref 13–16)
HGB BLD-MCNC: 8.9 G/DL (ref 13–16)
HGB BLD-MCNC: 9.9 G/DL (ref 13–16)
HGB UR QL STRIP: ABNORMAL
HGB UR QL STRIP: NEGATIVE
HYALINE CASTS URNS QL MICRO: ABNORMAL /LPF (ref 0–2)
HYALINE CASTS URNS QL MICRO: ABNORMAL /LPF (ref 0–2)
INR PPP: 1 (ref 0.8–1.2)
INR PPP: 1.7 (ref 0.8–1.2)
INR PPP: 1.9 (ref 0.8–1.2)
INR PPP: 2.4 (ref 0.8–1.2)
IRON SATN MFR SERPL: 52 %
IRON SERPL-MCNC: 104 UG/DL (ref 50–175)
KETONES UR QL STRIP.AUTO: NEGATIVE MG/DL
KETONES UR QL STRIP.AUTO: NEGATIVE MG/DL
KNOWN CARDIAC CONDITION, CST08: NORMAL
LACTATE BLD-SCNC: 0.7 MMOL/L (ref 0.4–2)
LACTATE BLD-SCNC: 2.9 MMOL/L (ref 0.4–2)
LACTATE BLD-SCNC: 4.9 MMOL/L (ref 0.4–2)
LACTATE SERPL-SCNC: 2.3 MMOL/L (ref 0.4–2)
LACTATE SERPL-SCNC: 2.4 MMOL/L (ref 0.4–2)
LACTATE SERPL-SCNC: 2.9 MMOL/L (ref 0.4–2)
LACTATE SERPL-SCNC: 3.4 MMOL/L (ref 0.4–2)
LACTATE SERPL-SCNC: 3.6 MMOL/L (ref 0.4–2)
LACTATE SERPL-SCNC: 3.8 MMOL/L (ref 0.4–2)
LACTATE SERPL-SCNC: 4.1 MMOL/L (ref 0.4–2)
LACTATE SERPL-SCNC: 4.4 MMOL/L (ref 0.4–2)
LACTATE SERPL-SCNC: 4.8 MMOL/L (ref 0.4–2)
LDLC SERPL CALC-MCNC: 133.4 MG/DL (ref 0–100)
LEUKOCYTE ESTERASE UR QL STRIP.AUTO: ABNORMAL
LEUKOCYTE ESTERASE UR QL STRIP.AUTO: NEGATIVE
LIPASE SERPL-CCNC: 1394 U/L (ref 73–393)
LIPASE SERPL-CCNC: 269 U/L (ref 73–393)
LIPASE SERPL-CCNC: 297 U/L (ref 73–393)
LIPID PROFILE,FLP: ABNORMAL
LOG10 HBV AS IU/ML 551596: 1.7 LOG10IU/ML
LYMPHOCYTES # BLD: 0.1 K/UL (ref 0.8–3.5)
LYMPHOCYTES # BLD: 0.3 K/UL (ref 0.8–3.5)
LYMPHOCYTES # BLD: 0.5 K/UL (ref 0.8–3.5)
LYMPHOCYTES # BLD: 1 K/UL (ref 0.9–3.6)
LYMPHOCYTES # BLD: 1.1 K/UL (ref 0.9–3.6)
LYMPHOCYTES # BLD: 1.1 K/UL (ref 0.9–3.6)
LYMPHOCYTES # BLD: 1.2 K/UL (ref 0.9–3.6)
LYMPHOCYTES # BLD: 1.3 K/UL (ref 0.9–3.6)
LYMPHOCYTES # BLD: 1.4 K/UL (ref 0.9–3.6)
LYMPHOCYTES # BLD: 1.5 K/UL (ref 0.9–3.6)
LYMPHOCYTES NFR BLD: 1 % (ref 20–51)
LYMPHOCYTES NFR BLD: 10 % (ref 20–51)
LYMPHOCYTES NFR BLD: 17 % (ref 21–52)
LYMPHOCYTES NFR BLD: 18 % (ref 21–52)
LYMPHOCYTES NFR BLD: 19 % (ref 21–52)
LYMPHOCYTES NFR BLD: 20 % (ref 21–52)
LYMPHOCYTES NFR BLD: 26 % (ref 21–52)
LYMPHOCYTES NFR BLD: 32 % (ref 21–52)
LYMPHOCYTES NFR BLD: 35 % (ref 21–52)
LYMPHOCYTES NFR BLD: 7 % (ref 20–51)
LYMPHOCYTES NFR FLD: 56 %
MAGNESIUM SERPL-MCNC: 2.2 MG/DL (ref 1.6–2.6)
MAGNESIUM SERPL-MCNC: 2.3 MG/DL (ref 1.6–2.6)
MAGNESIUM SERPL-MCNC: 2.4 MG/DL (ref 1.6–2.6)
MAGNESIUM SERPL-MCNC: 2.8 MG/DL (ref 1.6–2.6)
MAX. DIASTOLIC BP, CST04: 95 MMHG
MAX. HEART RATE, CST05: 74 BPM
MAX. SYSTOLIC BP, CST03: 235 MMHG
MCH RBC QN AUTO: 31.3 PG (ref 24–34)
MCH RBC QN AUTO: 31.5 PG (ref 24–34)
MCH RBC QN AUTO: 32.3 PG (ref 24–34)
MCH RBC QN AUTO: 32.7 PG (ref 24–34)
MCH RBC QN AUTO: 32.7 PG (ref 24–34)
MCH RBC QN AUTO: 33.3 PG (ref 24–34)
MCH RBC QN AUTO: 34.1 PG (ref 24–34)
MCH RBC QN AUTO: 34.2 PG (ref 24–34)
MCH RBC QN AUTO: 34.4 PG (ref 24–34)
MCHC RBC AUTO-ENTMCNC: 33.6 G/DL (ref 31–37)
MCHC RBC AUTO-ENTMCNC: 34 G/DL (ref 31–37)
MCHC RBC AUTO-ENTMCNC: 34.2 G/DL (ref 31–37)
MCHC RBC AUTO-ENTMCNC: 34.6 G/DL (ref 31–37)
MCHC RBC AUTO-ENTMCNC: 34.7 G/DL (ref 31–37)
MCHC RBC AUTO-ENTMCNC: 34.8 G/DL (ref 31–37)
MCHC RBC AUTO-ENTMCNC: 34.9 G/DL (ref 31–37)
MCHC RBC AUTO-ENTMCNC: 35.7 G/DL (ref 31–37)
MCHC RBC AUTO-ENTMCNC: 36.4 G/DL (ref 31–37)
MCV RBC AUTO: 100.6 FL (ref 74–97)
MCV RBC AUTO: 87.8 FL (ref 74–97)
MCV RBC AUTO: 88.9 FL (ref 74–97)
MCV RBC AUTO: 90.9 FL (ref 74–97)
MCV RBC AUTO: 94.5 FL (ref 74–97)
MCV RBC AUTO: 95.7 FL (ref 74–97)
MCV RBC AUTO: 96 FL (ref 74–97)
MCV RBC AUTO: 96.2 FL (ref 74–97)
MCV RBC AUTO: 97.5 FL (ref 74–97)
MCV RBC AUTO: 98.9 FL (ref 74–97)
MCV RBC AUTO: 99.3 FL (ref 74–97)
METAMYELOCYTES NFR BLD MANUAL: 1 %
METAMYELOCYTES NFR BLD MANUAL: 1 %
METHADONE UR QL: NEGATIVE
MONOCYTES # BLD: 0 K/UL (ref 0.05–1.2)
MONOCYTES # BLD: 0 K/UL (ref 0–1)
MONOCYTES # BLD: 0.1 K/UL (ref 0.05–1.2)
MONOCYTES # BLD: 0.1 K/UL (ref 0–1)
MONOCYTES # BLD: 0.2 K/UL (ref 0.05–1.2)
MONOCYTES # BLD: 0.2 K/UL (ref 0–1)
MONOCYTES # BLD: 0.4 K/UL (ref 0.05–1.2)
MONOCYTES NFR BLD: 1 % (ref 2–9)
MONOCYTES NFR BLD: 1 % (ref 2–9)
MONOCYTES NFR BLD: 1 % (ref 3–10)
MONOCYTES NFR BLD: 2 % (ref 3–10)
MONOCYTES NFR BLD: 3 % (ref 2–9)
MONOCYTES NFR BLD: 3 % (ref 3–10)
MONOCYTES NFR BLD: 3 % (ref 3–10)
MONOCYTES NFR BLD: 4 % (ref 3–10)
MONOCYTES NFR BLD: 4 % (ref 3–10)
MONOCYTES NFR BLD: 5 % (ref 3–10)
MONOCYTES NFR FLD: 8 %
MUCOUS THREADS URNS QL MICRO: ABNORMAL /LPF
NEUTROPHILS NFR FLD: 36 %
NEUTS BAND # FLD: 0 %
NEUTS BAND NFR BLD MANUAL: 10 % (ref 0–5)
NEUTS BAND NFR BLD MANUAL: 14 % (ref 0–5)
NEUTS BAND NFR BLD MANUAL: 17 % (ref 0–5)
NEUTS SEG # BLD: 2.3 K/UL (ref 1.8–8)
NEUTS SEG # BLD: 2.6 K/UL (ref 1.8–8)
NEUTS SEG # BLD: 3.1 K/UL (ref 1.8–8)
NEUTS SEG # BLD: 3.6 K/UL (ref 1.8–8)
NEUTS SEG # BLD: 4.5 K/UL (ref 1.8–8)
NEUTS SEG # BLD: 4.7 K/UL (ref 1.8–8)
NEUTS SEG # BLD: 5.5 K/UL (ref 1.8–8)
NEUTS SEG # BLD: 5.6 K/UL (ref 1.8–8)
NEUTS SEG # BLD: 6.5 K/UL (ref 1.8–8)
NEUTS SEG # BLD: 7.7 K/UL (ref 1.8–8)
NEUTS SEG NFR BLD: 61 % (ref 40–73)
NEUTS SEG NFR BLD: 67 % (ref 40–73)
NEUTS SEG NFR BLD: 70 % (ref 40–73)
NEUTS SEG NFR BLD: 74 % (ref 42–75)
NEUTS SEG NFR BLD: 75 % (ref 42–75)
NEUTS SEG NFR BLD: 77 % (ref 40–73)
NEUTS SEG NFR BLD: 78 % (ref 40–73)
NEUTS SEG NFR BLD: 79 % (ref 40–73)
NEUTS SEG NFR BLD: 79 % (ref 40–73)
NEUTS SEG NFR BLD: 85 % (ref 42–75)
NITRITE UR QL STRIP.AUTO: NEGATIVE
NITRITE UR QL STRIP.AUTO: NEGATIVE
NRBC BLD-RTO: 2 PER 100 WBC
NRBC BLD-RTO: 4 PER 100 WBC
NRBC BLD-RTO: 6 PER 100 WBC
NUC CELL # FLD: 75 /CU MM
OPIATES UR QL: NEGATIVE
OTHER,OTHU: ABNORMAL
OVERALL BP RESPONSE TO EXERCISE, CST16: NORMAL
OVERALL HR RESPONSE TO EXERCISE, CST15: NORMAL
P-R INTERVAL, ECG05: 142 MS
P-R INTERVAL, ECG05: 142 MS
P-R INTERVAL, ECG05: 144 MS
P-R INTERVAL, ECG05: 172 MS
P-R INTERVAL, ECG05: 210 MS
P-R INTERVAL, ECG05: 218 MS
PCP UR QL: NEGATIVE
PEAK EX METS, CST10: 1 METS
PERIPHERAL SMEAR,PSM: NORMAL
PH UR STRIP: 5 [PH] (ref 5–8)
PH UR STRIP: 5 [PH] (ref 5–8)
PHOSPHATE SERPL-MCNC: 3.7 MG/DL (ref 2.5–4.9)
PHOSPHATE SERPL-MCNC: 8.8 MG/DL (ref 2.5–4.9)
PHOSPHATE SERPL-MCNC: 9.3 MG/DL (ref 2.5–4.9)
PLATELET # BLD AUTO: 100 K/UL (ref 135–420)
PLATELET # BLD AUTO: 110 K/UL (ref 135–420)
PLATELET # BLD AUTO: 111 K/UL (ref 135–420)
PLATELET # BLD AUTO: 125 K/UL (ref 135–420)
PLATELET # BLD AUTO: 165 K/UL (ref 135–420)
PLATELET # BLD AUTO: 49 K/UL (ref 135–420)
PLATELET # BLD AUTO: 56 K/UL (ref 135–420)
PLATELET # BLD AUTO: 82 K/UL (ref 135–420)
PLATELET # BLD AUTO: 89 K/UL (ref 135–420)
PLATELET # BLD AUTO: 92 K/UL (ref 135–420)
PLATELET # BLD AUTO: 92 K/UL (ref 135–420)
PLATELET COMMENTS,PCOM: ABNORMAL
PMV BLD AUTO: 10.4 FL (ref 9.2–11.8)
PMV BLD AUTO: 10.9 FL (ref 9.2–11.8)
PMV BLD AUTO: 11.5 FL (ref 9.2–11.8)
PMV BLD AUTO: 12.3 FL (ref 9.2–11.8)
PMV BLD AUTO: 12.5 FL (ref 9.2–11.8)
PMV BLD AUTO: 12.6 FL (ref 9.2–11.8)
PMV BLD AUTO: 12.7 FL (ref 9.2–11.8)
PMV BLD AUTO: 9.9 FL (ref 9.2–11.8)
POTASSIUM SERPL-SCNC: 2.8 MMOL/L (ref 3.5–5.5)
POTASSIUM SERPL-SCNC: 3.1 MMOL/L (ref 3.5–5.5)
POTASSIUM SERPL-SCNC: 3.2 MMOL/L (ref 3.5–5.5)
POTASSIUM SERPL-SCNC: 3.4 MMOL/L (ref 3.5–5.5)
POTASSIUM SERPL-SCNC: 3.8 MMOL/L (ref 3.5–5.5)
POTASSIUM SERPL-SCNC: 4 MMOL/L (ref 3.5–5.5)
POTASSIUM SERPL-SCNC: 4 MMOL/L (ref 3.5–5.5)
POTASSIUM SERPL-SCNC: 4.2 MMOL/L (ref 3.5–5.5)
POTASSIUM SERPL-SCNC: 4.4 MMOL/L (ref 3.5–5.5)
POTASSIUM SERPL-SCNC: 4.8 MMOL/L (ref 3.5–5.5)
POTASSIUM SERPL-SCNC: 4.8 MMOL/L (ref 3.5–5.5)
POTASSIUM SERPL-SCNC: 5 MMOL/L (ref 3.5–5.5)
POTASSIUM SERPL-SCNC: 5 MMOL/L (ref 3.5–5.5)
PROT SERPL-MCNC: 3.5 G/DL (ref 6.4–8.2)
PROT SERPL-MCNC: 3.9 G/DL (ref 6.4–8.2)
PROT SERPL-MCNC: 4.4 G/DL (ref 6.4–8.2)
PROT SERPL-MCNC: 4.6 G/DL (ref 6.4–8.2)
PROT SERPL-MCNC: 4.7 G/DL (ref 6.4–8.2)
PROT SERPL-MCNC: 5.3 G/DL (ref 6.4–8.2)
PROT SERPL-MCNC: 5.3 G/DL (ref 6.4–8.2)
PROT SERPL-MCNC: 5.4 G/DL (ref 6.4–8.2)
PROT SERPL-MCNC: 5.7 G/DL (ref 6.4–8.2)
PROT SERPL-MCNC: 6.1 G/DL (ref 6.4–8.2)
PROT SERPL-MCNC: 7.3 G/DL (ref 6.4–8.2)
PROT SERPL-MCNC: 8.4 G/DL (ref 6.4–8.2)
PROT UR STRIP-MCNC: 30 MG/DL
PROT UR STRIP-MCNC: ABNORMAL MG/DL
PROTHROMBIN TIME: 13.1 SEC (ref 11.5–15.2)
PROTHROMBIN TIME: 19.5 SEC (ref 11.5–15.2)
PROTHROMBIN TIME: 20.9 SEC (ref 11.5–15.2)
PROTHROMBIN TIME: 25.1 SEC (ref 11.5–15.2)
PROTOCOL NAME, CST01: NORMAL
PTH-INTACT SERPL-MCNC: 1208.2 PG/ML (ref 14–72)
Q-T INTERVAL, ECG07: 372 MS
Q-T INTERVAL, ECG07: 388 MS
Q-T INTERVAL, ECG07: 422 MS
Q-T INTERVAL, ECG07: 462 MS
Q-T INTERVAL, ECG07: 506 MS
Q-T INTERVAL, ECG07: 516 MS
QRS DURATION, ECG06: 82 MS
QRS DURATION, ECG06: 88 MS
QRS DURATION, ECG06: 90 MS
QRS DURATION, ECG06: 92 MS
QRS DURATION, ECG06: 96 MS
QRS DURATION, ECG06: 96 MS
QTC CALCULATION (BEZET), ECG08: 436 MS
QTC CALCULATION (BEZET), ECG08: 438 MS
QTC CALCULATION (BEZET), ECG08: 457 MS
QTC CALCULATION (BEZET), ECG08: 465 MS
QTC CALCULATION (BEZET), ECG08: 488 MS
QTC CALCULATION (BEZET), ECG08: 493 MS
RBC # BLD AUTO: 1.57 M/UL (ref 4.7–5.5)
RBC # BLD AUTO: 1.84 M/UL (ref 4.7–5.5)
RBC # BLD AUTO: 2.1 M/UL (ref 4.7–5.5)
RBC # BLD AUTO: 2.35 M/UL (ref 4.7–5.5)
RBC # BLD AUTO: 2.61 M/UL (ref 4.7–5.5)
RBC # BLD AUTO: 2.72 M/UL (ref 4.7–5.5)
RBC # BLD AUTO: 3.03 M/UL (ref 4.7–5.5)
RBC # BLD AUTO: 3.35 M/UL (ref 4.7–5.5)
RBC # BLD AUTO: 3.4 M/UL (ref 4.7–5.5)
RBC # BLD AUTO: 4.02 M/UL (ref 4.7–5.5)
RBC # BLD AUTO: 4.17 M/UL (ref 4.7–5.5)
RBC # FLD: ABNORMAL /CU MM
RBC #/AREA URNS HPF: ABNORMAL /HPF (ref 0–5)
RBC #/AREA URNS HPF: ABNORMAL /HPF (ref 0–5)
RBC MORPH BLD: ABNORMAL
SERVICE CMNT-IMP: ABNORMAL
SERVICE CMNT-IMP: NORMAL
SODIUM SERPL-SCNC: 139 MMOL/L (ref 136–145)
SODIUM SERPL-SCNC: 140 MMOL/L (ref 136–145)
SODIUM SERPL-SCNC: 141 MMOL/L (ref 136–145)
SODIUM SERPL-SCNC: 142 MMOL/L (ref 136–145)
SODIUM SERPL-SCNC: 143 MMOL/L (ref 136–145)
SP GR UR REFRACTOMETRY: 1.02 (ref 1–1.03)
SP GR UR REFRACTOMETRY: 1.02 (ref 1–1.03)
SPECIMEN EXP DATE BLD: NORMAL
SPECIMEN SOURCE FLD: ABNORMAL
TEST INDICATION, CST09: NORMAL
TEST INFORMATION, 550045: NORMAL
TEST INFORMATION: NORMAL
TIBC SERPL-MCNC: 201 UG/DL (ref 250–450)
TOTAL CELLS COUNTED SPEC: 25
TRIGL SERPL-MCNC: 188 MG/DL (ref ?–150)
TROPONIN I BLD-MCNC: 0.54 NG/ML (ref 0–0.08)
TROPONIN I SERPL-MCNC: 0.08 NG/ML (ref 0–0.04)
TROPONIN I SERPL-MCNC: 0.09 NG/ML (ref 0–0.04)
TROPONIN I SERPL-MCNC: 0.11 NG/ML (ref 0–0.04)
TROPONIN I SERPL-MCNC: 0.18 NG/ML (ref 0–0.04)
TROPONIN I SERPL-MCNC: 0.22 NG/ML (ref 0–0.04)
TROPONIN I SERPL-MCNC: 0.22 NG/ML (ref 0–0.04)
TROPONIN I SERPL-MCNC: 0.5 NG/ML (ref 0–0.04)
TROPONIN I SERPL-MCNC: 0.59 NG/ML (ref 0–0.04)
TROPONIN I SERPL-MCNC: 0.6 NG/ML (ref 0–0.04)
TROPONIN I SERPL-MCNC: 0.61 NG/ML (ref 0–0.04)
TROPONIN I SERPL-MCNC: 0.9 NG/ML (ref 0–0.04)
TSH SERPL DL<=0.05 MIU/L-ACNC: 1.34 UIU/ML (ref 0.36–3.74)
TSH SERPL DL<=0.05 MIU/L-ACNC: 1.72 UIU/ML (ref 0.36–3.74)
TSH SERPL DL<=0.05 MIU/L-ACNC: 2.17 UIU/ML (ref 0.36–3.74)
UROBILINOGEN UR QL STRIP.AUTO: 0.2 EU/DL (ref 0.2–1)
UROBILINOGEN UR QL STRIP.AUTO: 1 EU/DL (ref 0.2–1)
VANCOMYCIN SERPL-MCNC: 15.4 UG/ML (ref 5–40)
VANCOMYCIN SERPL-MCNC: 17.6 UG/ML (ref 5–40)
VANCOMYCIN SERPL-MCNC: 19.2 UG/ML (ref 5–40)
VENTRICULAR RATE, ECG03: 55 BPM
VENTRICULAR RATE, ECG03: 56 BPM
VENTRICULAR RATE, ECG03: 61 BPM
VENTRICULAR RATE, ECG03: 65 BPM
VENTRICULAR RATE, ECG03: 76 BPM
VENTRICULAR RATE, ECG03: 91 BPM
VIT B12 SERPL-MCNC: >2000 PG/ML (ref 211–911)
VIT B12 SERPL-MCNC: >2000 PG/ML (ref 211–911)
VLDLC SERPL CALC-MCNC: 37.6 MG/DL
WBC # BLD AUTO: 3.4 K/UL (ref 4.6–13.2)
WBC # BLD AUTO: 3.4 K/UL (ref 4.6–13.2)
WBC # BLD AUTO: 4.2 K/UL (ref 4.6–13.2)
WBC # BLD AUTO: 4.9 K/UL (ref 4.6–13.2)
WBC # BLD AUTO: 5.1 K/UL (ref 4.6–13.2)
WBC # BLD AUTO: 5.7 K/UL (ref 4.6–13.2)
WBC # BLD AUTO: 5.9 K/UL (ref 4.6–13.2)
WBC # BLD AUTO: 7.1 K/UL (ref 4.6–13.2)
WBC # BLD AUTO: 7.1 K/UL (ref 4.6–13.2)
WBC # BLD AUTO: 7.3 K/UL (ref 4.6–13.2)
WBC # BLD AUTO: 8.1 K/UL (ref 4.6–13.2)
WBC URNS QL MICRO: ABNORMAL /HPF (ref 0–4)
WBC URNS QL MICRO: ABNORMAL /HPF (ref 0–4)

## 2017-01-01 PROCEDURE — 65270000029 HC RM PRIVATE

## 2017-01-01 PROCEDURE — 80053 COMPREHEN METABOLIC PANEL: CPT | Performed by: INTERNAL MEDICINE

## 2017-01-01 PROCEDURE — 93308 TTE F-UP OR LMTD: CPT

## 2017-01-01 PROCEDURE — 80307 DRUG TEST PRSMV CHEM ANLYZR: CPT | Performed by: FAMILY MEDICINE

## 2017-01-01 PROCEDURE — 74011250636 HC RX REV CODE- 250/636: Performed by: INTERNAL MEDICINE

## 2017-01-01 PROCEDURE — 74011250637 HC RX REV CODE- 250/637: Performed by: FAMILY MEDICINE

## 2017-01-01 PROCEDURE — 80053 COMPREHEN METABOLIC PANEL: CPT | Performed by: FAMILY MEDICINE

## 2017-01-01 PROCEDURE — 87340 HEPATITIS B SURFACE AG IA: CPT | Performed by: FAMILY MEDICINE

## 2017-01-01 PROCEDURE — 84100 ASSAY OF PHOSPHORUS: CPT | Performed by: INTERNAL MEDICINE

## 2017-01-01 PROCEDURE — 74011250636 HC RX REV CODE- 250/636: Performed by: EMERGENCY MEDICINE

## 2017-01-01 PROCEDURE — 93005 ELECTROCARDIOGRAM TRACING: CPT

## 2017-01-01 PROCEDURE — 74011000250 HC RX REV CODE- 250: Performed by: INTERNAL MEDICINE

## 2017-01-01 PROCEDURE — 65610000006 HC RM INTENSIVE CARE

## 2017-01-01 PROCEDURE — 85610 PROTHROMBIN TIME: CPT | Performed by: PHYSICIAN ASSISTANT

## 2017-01-01 PROCEDURE — 99152 MOD SED SAME PHYS/QHP 5/>YRS: CPT

## 2017-01-01 PROCEDURE — 82550 ASSAY OF CK (CPK): CPT | Performed by: PHYSICIAN ASSISTANT

## 2017-01-01 PROCEDURE — 82962 GLUCOSE BLOOD TEST: CPT

## 2017-01-01 PROCEDURE — 74011000250 HC RX REV CODE- 250: Performed by: PHYSICIAN ASSISTANT

## 2017-01-01 PROCEDURE — 87517 HEPATITIS B DNA QUANT: CPT | Performed by: INTERNAL MEDICINE

## 2017-01-01 PROCEDURE — 74011636637 HC RX REV CODE- 636/637: Performed by: PHYSICIAN ASSISTANT

## 2017-01-01 PROCEDURE — 74011250637 HC RX REV CODE- 250/637: Performed by: INTERNAL MEDICINE

## 2017-01-01 PROCEDURE — 80053 COMPREHEN METABOLIC PANEL: CPT | Performed by: EMERGENCY MEDICINE

## 2017-01-01 PROCEDURE — 97161 PT EVAL LOW COMPLEX 20 MIN: CPT

## 2017-01-01 PROCEDURE — 83735 ASSAY OF MAGNESIUM: CPT | Performed by: FAMILY MEDICINE

## 2017-01-01 PROCEDURE — 74011250637 HC RX REV CODE- 250/637: Performed by: PHYSICIAN ASSISTANT

## 2017-01-01 PROCEDURE — 71020 XR CHEST PA LAT: CPT

## 2017-01-01 PROCEDURE — 74022 RADEX COMPL AQT ABD SERIES: CPT

## 2017-01-01 PROCEDURE — 74011250636 HC RX REV CODE- 250/636: Performed by: FAMILY MEDICINE

## 2017-01-01 PROCEDURE — 88333 PATH CONSLTJ SURG CYTO XM 1: CPT | Performed by: RADIOLOGY

## 2017-01-01 PROCEDURE — 97110 THERAPEUTIC EXERCISES: CPT

## 2017-01-01 PROCEDURE — 74011000250 HC RX REV CODE- 250: Performed by: FAMILY MEDICINE

## 2017-01-01 PROCEDURE — 74011000258 HC RX REV CODE- 258: Performed by: PHYSICIAN ASSISTANT

## 2017-01-01 PROCEDURE — P9016 RBC LEUKOCYTES REDUCED: HCPCS

## 2017-01-01 PROCEDURE — 88307 TISSUE EXAM BY PATHOLOGIST: CPT | Performed by: RADIOLOGY

## 2017-01-01 PROCEDURE — 83970 ASSAY OF PARATHORMONE: CPT | Performed by: INTERNAL MEDICINE

## 2017-01-01 PROCEDURE — 83690 ASSAY OF LIPASE: CPT | Performed by: EMERGENCY MEDICINE

## 2017-01-01 PROCEDURE — 81001 URINALYSIS AUTO W/SCOPE: CPT | Performed by: FAMILY MEDICINE

## 2017-01-01 PROCEDURE — 74011000250 HC RX REV CODE- 250: Performed by: EMERGENCY MEDICINE

## 2017-01-01 PROCEDURE — 83036 HEMOGLOBIN GLYCOSYLATED A1C: CPT | Performed by: PHYSICIAN ASSISTANT

## 2017-01-01 PROCEDURE — 83880 ASSAY OF NATRIURETIC PEPTIDE: CPT | Performed by: EMERGENCY MEDICINE

## 2017-01-01 PROCEDURE — 30233K1 TRANSFUSION OF NONAUTOLOGOUS FROZEN PLASMA INTO PERIPHERAL VEIN, PERCUTANEOUS APPROACH: ICD-10-PCS | Performed by: FAMILY MEDICINE

## 2017-01-01 PROCEDURE — 74176 CT ABD & PELVIS W/O CONTRAST: CPT

## 2017-01-01 PROCEDURE — 87522 HEPATITIS C REVRS TRNSCRPJ: CPT | Performed by: FAMILY MEDICINE

## 2017-01-01 PROCEDURE — 77030005514 HC CATH URETH FOL14 BARD -A

## 2017-01-01 PROCEDURE — 80053 COMPREHEN METABOLIC PANEL: CPT

## 2017-01-01 PROCEDURE — 74011000250 HC RX REV CODE- 250

## 2017-01-01 PROCEDURE — 77030020186 HC BOOT HL PROTCT SAGE -B

## 2017-01-01 PROCEDURE — 83605 ASSAY OF LACTIC ACID: CPT

## 2017-01-01 PROCEDURE — 82306 VITAMIN D 25 HYDROXY: CPT | Performed by: INTERNAL MEDICINE

## 2017-01-01 PROCEDURE — 77030011256 HC DRSG MEPILEX <16IN NO BORD MOLN -A

## 2017-01-01 PROCEDURE — 80061 LIPID PANEL: CPT | Performed by: PHYSICIAN ASSISTANT

## 2017-01-01 PROCEDURE — 36430 TRANSFUSION BLD/BLD COMPNT: CPT

## 2017-01-01 PROCEDURE — 99218 HC RM OBSERVATION: CPT

## 2017-01-01 PROCEDURE — 71010 XR CHEST PORT: CPT

## 2017-01-01 PROCEDURE — 36415 COLL VENOUS BLD VENIPUNCTURE: CPT | Performed by: FAMILY MEDICINE

## 2017-01-01 PROCEDURE — 86803 HEPATITIS C AB TEST: CPT | Performed by: FAMILY MEDICINE

## 2017-01-01 PROCEDURE — 74011636637 HC RX REV CODE- 636/637: Performed by: FAMILY MEDICINE

## 2017-01-01 PROCEDURE — 86707 HEPATITIS BE ANTIBODY: CPT | Performed by: INTERNAL MEDICINE

## 2017-01-01 PROCEDURE — 74011000258 HC RX REV CODE- 258: Performed by: EMERGENCY MEDICINE

## 2017-01-01 PROCEDURE — 83605 ASSAY OF LACTIC ACID: CPT | Performed by: PHYSICIAN ASSISTANT

## 2017-01-01 PROCEDURE — 87350 HEPATITIS BE AG IA: CPT | Performed by: INTERNAL MEDICINE

## 2017-01-01 PROCEDURE — 74011250636 HC RX REV CODE- 250/636: Performed by: PHYSICIAN ASSISTANT

## 2017-01-01 PROCEDURE — 74011000258 HC RX REV CODE- 258: Performed by: FAMILY MEDICINE

## 2017-01-01 PROCEDURE — 85025 COMPLETE CBC W/AUTO DIFF WBC: CPT

## 2017-01-01 PROCEDURE — 74011250636 HC RX REV CODE- 250/636

## 2017-01-01 PROCEDURE — 87040 BLOOD CULTURE FOR BACTERIA: CPT | Performed by: FAMILY MEDICINE

## 2017-01-01 PROCEDURE — 83036 HEMOGLOBIN GLYCOSYLATED A1C: CPT | Performed by: FAMILY MEDICINE

## 2017-01-01 PROCEDURE — 99283 EMERGENCY DEPT VISIT LOW MDM: CPT

## 2017-01-01 PROCEDURE — 82607 VITAMIN B-12: CPT | Performed by: FAMILY MEDICINE

## 2017-01-01 PROCEDURE — 93306 TTE W/DOPPLER COMPLETE: CPT

## 2017-01-01 PROCEDURE — 87641 MR-STAPH DNA AMP PROBE: CPT | Performed by: FAMILY MEDICINE

## 2017-01-01 PROCEDURE — 85027 COMPLETE CBC AUTOMATED: CPT | Performed by: FAMILY MEDICINE

## 2017-01-01 PROCEDURE — 96361 HYDRATE IV INFUSION ADD-ON: CPT

## 2017-01-01 PROCEDURE — 87341 HEP B SURFACE AG NEUTRLZJ IA: CPT | Performed by: FAMILY MEDICINE

## 2017-01-01 PROCEDURE — 82550 ASSAY OF CK (CPK): CPT | Performed by: EMERGENCY MEDICINE

## 2017-01-01 PROCEDURE — 78452 HT MUSCLE IMAGE SPECT MULT: CPT | Performed by: INTERNAL MEDICINE

## 2017-01-01 PROCEDURE — C1729 CATH, DRAINAGE: HCPCS

## 2017-01-01 PROCEDURE — 74011000258 HC RX REV CODE- 258: Performed by: INTERNAL MEDICINE

## 2017-01-01 PROCEDURE — 87077 CULTURE AEROBIC IDENTIFY: CPT | Performed by: FAMILY MEDICINE

## 2017-01-01 PROCEDURE — 36415 COLL VENOUS BLD VENIPUNCTURE: CPT | Performed by: INTERNAL MEDICINE

## 2017-01-01 PROCEDURE — 85027 COMPLETE CBC AUTOMATED: CPT | Performed by: EMERGENCY MEDICINE

## 2017-01-01 PROCEDURE — 86704 HEP B CORE ANTIBODY TOTAL: CPT | Performed by: INTERNAL MEDICINE

## 2017-01-01 PROCEDURE — 86301 IMMUNOASSAY TUMOR CA 19-9: CPT | Performed by: INTERNAL MEDICINE

## 2017-01-01 PROCEDURE — 86920 COMPATIBILITY TEST SPIN: CPT

## 2017-01-01 PROCEDURE — 84484 ASSAY OF TROPONIN QUANT: CPT

## 2017-01-01 PROCEDURE — 72170 X-RAY EXAM OF PELVIS: CPT

## 2017-01-01 PROCEDURE — 83690 ASSAY OF LIPASE: CPT

## 2017-01-01 PROCEDURE — 80202 ASSAY OF VANCOMYCIN: CPT | Performed by: INTERNAL MEDICINE

## 2017-01-01 PROCEDURE — 77030016570 HC BLNKT BAIR HGGR 3M -B

## 2017-01-01 PROCEDURE — 83735 ASSAY OF MAGNESIUM: CPT | Performed by: EMERGENCY MEDICINE

## 2017-01-01 PROCEDURE — 83880 ASSAY OF NATRIURETIC PEPTIDE: CPT

## 2017-01-01 PROCEDURE — 93975 VASCULAR STUDY: CPT

## 2017-01-01 PROCEDURE — 87341 HEP B SURFACE AG NEUTRLZJ IA: CPT | Performed by: INTERNAL MEDICINE

## 2017-01-01 PROCEDURE — 96374 THER/PROPH/DIAG INJ IV PUSH: CPT

## 2017-01-01 PROCEDURE — 97116 GAIT TRAINING THERAPY: CPT

## 2017-01-01 PROCEDURE — 87340 HEPATITIS B SURFACE AG IA: CPT | Performed by: INTERNAL MEDICINE

## 2017-01-01 PROCEDURE — 82140 ASSAY OF AMMONIA: CPT | Performed by: EMERGENCY MEDICINE

## 2017-01-01 PROCEDURE — P9059 PLASMA, FRZ BETWEEN 8-24HOUR: HCPCS | Performed by: INTERNAL MEDICINE

## 2017-01-01 PROCEDURE — 99284 EMERGENCY DEPT VISIT MOD MDM: CPT

## 2017-01-01 PROCEDURE — 99285 EMERGENCY DEPT VISIT HI MDM: CPT

## 2017-01-01 PROCEDURE — C1751 CATH, INF, PER/CENT/MIDLINE: HCPCS

## 2017-01-01 PROCEDURE — 30233N1 TRANSFUSION OF NONAUTOLOGOUS RED BLOOD CELLS INTO PERIPHERAL VEIN, PERCUTANEOUS APPROACH: ICD-10-PCS | Performed by: FAMILY MEDICINE

## 2017-01-01 PROCEDURE — 74011250637 HC RX REV CODE- 250/637: Performed by: EMERGENCY MEDICINE

## 2017-01-01 PROCEDURE — 5A1D70Z PERFORMANCE OF URINARY FILTRATION, INTERMITTENT, LESS THAN 6 HOURS PER DAY: ICD-10-PCS | Performed by: FAMILY MEDICINE

## 2017-01-01 PROCEDURE — 97530 THERAPEUTIC ACTIVITIES: CPT

## 2017-01-01 PROCEDURE — 75810000137 HC PLCMT CENT VENOUS CATH

## 2017-01-01 PROCEDURE — 84100 ASSAY OF PHOSPHORUS: CPT | Performed by: EMERGENCY MEDICINE

## 2017-01-01 PROCEDURE — 85730 THROMBOPLASTIN TIME PARTIAL: CPT

## 2017-01-01 PROCEDURE — 65660000000 HC RM CCU STEPDOWN

## 2017-01-01 PROCEDURE — 80048 BASIC METABOLIC PNL TOTAL CA: CPT | Performed by: FAMILY MEDICINE

## 2017-01-01 PROCEDURE — 02HV33Z INSERTION OF INFUSION DEVICE INTO SUPERIOR VENA CAVA, PERCUTANEOUS APPROACH: ICD-10-PCS | Performed by: EMERGENCY MEDICINE

## 2017-01-01 PROCEDURE — 85025 COMPLETE CBC W/AUTO DIFF WBC: CPT | Performed by: PHYSICIAN ASSISTANT

## 2017-01-01 PROCEDURE — 93017 CV STRESS TEST TRACING ONLY: CPT | Performed by: INTERNAL MEDICINE

## 2017-01-01 PROCEDURE — P9047 ALBUMIN (HUMAN), 25%, 50ML: HCPCS

## 2017-01-01 PROCEDURE — 88112 CYTOPATH CELL ENHANCE TECH: CPT

## 2017-01-01 PROCEDURE — 85025 COMPLETE CBC W/AUTO DIFF WBC: CPT | Performed by: EMERGENCY MEDICINE

## 2017-01-01 PROCEDURE — P9047 ALBUMIN (HUMAN), 25%, 50ML: HCPCS | Performed by: INTERNAL MEDICINE

## 2017-01-01 PROCEDURE — A9500 TC99M SESTAMIBI: HCPCS

## 2017-01-01 PROCEDURE — 81001 URINALYSIS AUTO W/SCOPE: CPT | Performed by: EMERGENCY MEDICINE

## 2017-01-01 PROCEDURE — 83735 ASSAY OF MAGNESIUM: CPT | Performed by: PHYSICIAN ASSISTANT

## 2017-01-01 PROCEDURE — 86900 BLOOD TYPING SEROLOGIC ABO: CPT | Performed by: EMERGENCY MEDICINE

## 2017-01-01 PROCEDURE — 96365 THER/PROPH/DIAG IV INF INIT: CPT

## 2017-01-01 PROCEDURE — 80048 BASIC METABOLIC PNL TOTAL CA: CPT

## 2017-01-01 PROCEDURE — 82330 ASSAY OF CALCIUM: CPT | Performed by: PHYSICIAN ASSISTANT

## 2017-01-01 PROCEDURE — 89051 BODY FLUID CELL COUNT: CPT | Performed by: PHYSICIAN ASSISTANT

## 2017-01-01 PROCEDURE — 84100 ASSAY OF PHOSPHORUS: CPT | Performed by: PHYSICIAN ASSISTANT

## 2017-01-01 PROCEDURE — 77030027138 HC INCENT SPIROMETER -A

## 2017-01-01 PROCEDURE — 97165 OT EVAL LOW COMPLEX 30 MIN: CPT

## 2017-01-01 PROCEDURE — 80053 COMPREHEN METABOLIC PANEL: CPT | Performed by: PHYSICIAN ASSISTANT

## 2017-01-01 PROCEDURE — 84484 ASSAY OF TROPONIN QUANT: CPT | Performed by: PHYSICIAN ASSISTANT

## 2017-01-01 PROCEDURE — 84443 ASSAY THYROID STIM HORMONE: CPT | Performed by: PHYSICIAN ASSISTANT

## 2017-01-01 PROCEDURE — 74011000272 HC RX REV CODE- 272

## 2017-01-01 PROCEDURE — 0W9G3ZZ DRAINAGE OF PERITONEAL CAVITY, PERCUTANEOUS APPROACH: ICD-10-PCS | Performed by: RADIOLOGY

## 2017-01-01 PROCEDURE — 77030010545

## 2017-01-01 PROCEDURE — 82550 ASSAY OF CK (CPK): CPT | Performed by: FAMILY MEDICINE

## 2017-01-01 PROCEDURE — 82140 ASSAY OF AMMONIA: CPT | Performed by: FAMILY MEDICINE

## 2017-01-01 PROCEDURE — 82140 ASSAY OF AMMONIA: CPT

## 2017-01-01 PROCEDURE — 85018 HEMOGLOBIN: CPT

## 2017-01-01 PROCEDURE — 84443 ASSAY THYROID STIM HORMONE: CPT | Performed by: FAMILY MEDICINE

## 2017-01-01 PROCEDURE — 96375 TX/PRO/DX INJ NEW DRUG ADDON: CPT

## 2017-01-01 PROCEDURE — 87040 BLOOD CULTURE FOR BACTERIA: CPT | Performed by: EMERGENCY MEDICINE

## 2017-01-01 PROCEDURE — 88313 SPECIAL STAINS GROUP 2: CPT | Performed by: RADIOLOGY

## 2017-01-01 PROCEDURE — 87070 CULTURE OTHR SPECIMN AEROBIC: CPT | Performed by: PHYSICIAN ASSISTANT

## 2017-01-01 PROCEDURE — 88334 PATH CONSLTJ SURG CYTO XM EA: CPT | Performed by: RADIOLOGY

## 2017-01-01 PROCEDURE — 85730 THROMBOPLASTIN TIME PARTIAL: CPT | Performed by: EMERGENCY MEDICINE

## 2017-01-01 PROCEDURE — 80202 ASSAY OF VANCOMYCIN: CPT | Performed by: FAMILY MEDICINE

## 2017-01-01 PROCEDURE — C1769 GUIDE WIRE: HCPCS

## 2017-01-01 PROCEDURE — 87086 URINE CULTURE/COLONY COUNT: CPT | Performed by: FAMILY MEDICINE

## 2017-01-01 PROCEDURE — 88305 TISSUE EXAM BY PATHOLOGIST: CPT

## 2017-01-01 PROCEDURE — 77030018719 HC DRSG PTCH ANTIMIC J&J -A

## 2017-01-01 PROCEDURE — 83540 ASSAY OF IRON: CPT | Performed by: FAMILY MEDICINE

## 2017-01-01 PROCEDURE — 82105 ALPHA-FETOPROTEIN SERUM: CPT | Performed by: FAMILY MEDICINE

## 2017-01-01 PROCEDURE — 86900 BLOOD TYPING SEROLOGIC ABO: CPT

## 2017-01-01 PROCEDURE — 82330 ASSAY OF CALCIUM: CPT | Performed by: INTERNAL MEDICINE

## 2017-01-01 PROCEDURE — 90935 HEMODIALYSIS ONE EVALUATION: CPT

## 2017-01-01 PROCEDURE — 77030011943

## 2017-01-01 PROCEDURE — 85730 THROMBOPLASTIN TIME PARTIAL: CPT | Performed by: PHYSICIAN ASSISTANT

## 2017-01-01 PROCEDURE — 77030013079 HC BLNKT BAIR HGGR 3M -A

## 2017-01-01 PROCEDURE — 0FB23ZX EXCISION OF LEFT LOBE LIVER, PERCUTANEOUS APPROACH, DIAGNOSTIC: ICD-10-PCS | Performed by: RADIOLOGY

## 2017-01-01 PROCEDURE — 85610 PROTHROMBIN TIME: CPT | Performed by: INTERNAL MEDICINE

## 2017-01-01 PROCEDURE — 85025 COMPLETE CBC W/AUTO DIFF WBC: CPT | Performed by: INTERNAL MEDICINE

## 2017-01-01 PROCEDURE — 85610 PROTHROMBIN TIME: CPT

## 2017-01-01 PROCEDURE — 85025 COMPLETE CBC W/AUTO DIFF WBC: CPT | Performed by: FAMILY MEDICINE

## 2017-01-01 PROCEDURE — 87186 SC STD MICRODIL/AGAR DIL: CPT | Performed by: FAMILY MEDICINE

## 2017-01-01 PROCEDURE — 86706 HEP B SURFACE ANTIBODY: CPT | Performed by: INTERNAL MEDICINE

## 2017-01-01 PROCEDURE — 85610 PROTHROMBIN TIME: CPT | Performed by: EMERGENCY MEDICINE

## 2017-01-01 RX ORDER — DEXTROSE MONOHYDRATE AND SODIUM CHLORIDE 5; .45 G/100ML; G/100ML
80 INJECTION, SOLUTION INTRAVENOUS CONTINUOUS
Status: DISCONTINUED | OUTPATIENT
Start: 2017-01-01 | End: 2017-12-18 | Stop reason: HOSPADM

## 2017-01-01 RX ORDER — LEVOFLOXACIN 5 MG/ML
500 INJECTION, SOLUTION INTRAVENOUS
Status: DISCONTINUED | OUTPATIENT
Start: 2017-01-01 | End: 2017-01-01

## 2017-01-01 RX ORDER — DEXTROSE 50 % IN WATER (D50W) INTRAVENOUS SYRINGE
25-50 AS NEEDED
Status: DISCONTINUED | OUTPATIENT
Start: 2017-01-01 | End: 2017-12-18 | Stop reason: HOSPADM

## 2017-01-01 RX ORDER — CEFTRIAXONE 1 G/1
1 INJECTION, POWDER, FOR SOLUTION INTRAMUSCULAR; INTRAVENOUS
Status: COMPLETED | OUTPATIENT
Start: 2017-01-01 | End: 2017-01-01

## 2017-01-01 RX ORDER — FUROSEMIDE 10 MG/ML
60 INJECTION INTRAMUSCULAR; INTRAVENOUS ONCE
Status: COMPLETED | OUTPATIENT
Start: 2017-01-01 | End: 2017-01-01

## 2017-01-01 RX ORDER — DEXTROSE MONOHYDRATE AND SODIUM CHLORIDE 5; .45 G/100ML; G/100ML
80 INJECTION, SOLUTION INTRAVENOUS CONTINUOUS
Status: DISCONTINUED | OUTPATIENT
Start: 2017-01-01 | End: 2017-01-01

## 2017-01-01 RX ORDER — ACETAMINOPHEN 325 MG/1
650 TABLET ORAL
Status: DISCONTINUED | OUTPATIENT
Start: 2017-01-01 | End: 2017-01-01 | Stop reason: HOSPADM

## 2017-01-01 RX ORDER — FENTANYL CITRATE 50 UG/ML
INJECTION, SOLUTION INTRAMUSCULAR; INTRAVENOUS
Status: COMPLETED
Start: 2017-01-01 | End: 2017-01-01

## 2017-01-01 RX ORDER — POTASSIUM CHLORIDE 1.5 G/1.77G
40 POWDER, FOR SOLUTION ORAL
Status: COMPLETED | OUTPATIENT
Start: 2017-01-01 | End: 2017-01-01

## 2017-01-01 RX ORDER — TRISODIUM CITRATE DIHYDRATE 4 G/100ML
2 INJECTION, SOLUTION INTRAVENOUS
Status: DISCONTINUED | OUTPATIENT
Start: 2017-01-01 | End: 2017-12-18 | Stop reason: HOSPADM

## 2017-01-01 RX ORDER — FUROSEMIDE 10 MG/ML
40 INJECTION INTRAMUSCULAR; INTRAVENOUS
Status: COMPLETED | OUTPATIENT
Start: 2017-01-01 | End: 2017-01-01

## 2017-01-01 RX ORDER — INSULIN LISPRO 100 [IU]/ML
INJECTION, SOLUTION INTRAVENOUS; SUBCUTANEOUS EVERY 6 HOURS
Status: DISCONTINUED | OUTPATIENT
Start: 2017-01-01 | End: 2017-12-18 | Stop reason: HOSPADM

## 2017-01-01 RX ORDER — MORPHINE SULFATE 2 MG/ML
2 INJECTION, SOLUTION INTRAMUSCULAR; INTRAVENOUS ONCE
Status: COMPLETED | OUTPATIENT
Start: 2017-01-01 | End: 2017-01-01

## 2017-01-01 RX ORDER — DEXTROSE 50 % IN WATER (D50W) INTRAVENOUS SYRINGE
25
Status: COMPLETED | OUTPATIENT
Start: 2017-01-01 | End: 2017-01-01

## 2017-01-01 RX ORDER — HEPARIN SODIUM 1000 [USP'U]/ML
1000 INJECTION, SOLUTION INTRAVENOUS; SUBCUTANEOUS
Status: DISCONTINUED | OUTPATIENT
Start: 2017-01-01 | End: 2017-01-01

## 2017-01-01 RX ORDER — FELODIPINE 10 MG/1
10 TABLET, EXTENDED RELEASE ORAL DAILY
Qty: 30 TAB | Refills: 1 | Status: SHIPPED
Start: 2017-01-01

## 2017-01-01 RX ORDER — HYDROCHLOROTHIAZIDE 25 MG/1
25 TABLET ORAL DAILY
Status: DISCONTINUED | OUTPATIENT
Start: 2017-01-01 | End: 2017-01-01 | Stop reason: HOSPADM

## 2017-01-01 RX ORDER — SODIUM CHLORIDE 0.9 % (FLUSH) 0.9 %
5-10 SYRINGE (ML) INJECTION AS NEEDED
Status: DISCONTINUED | OUTPATIENT
Start: 2017-01-01 | End: 2017-12-18 | Stop reason: HOSPADM

## 2017-01-01 RX ORDER — FELODIPINE 5 MG/1
10 TABLET, EXTENDED RELEASE ORAL DAILY
Status: DISCONTINUED | OUTPATIENT
Start: 2017-01-01 | End: 2017-01-01 | Stop reason: HOSPADM

## 2017-01-01 RX ORDER — NOREPINEPHRINE BITARTRATE/D5W 8 MG/250ML
2-16 PLASTIC BAG, INJECTION (ML) INTRAVENOUS
Status: DISCONTINUED | OUTPATIENT
Start: 2017-01-01 | End: 2017-12-18 | Stop reason: HOSPADM

## 2017-01-01 RX ORDER — HYDRALAZINE HYDROCHLORIDE 25 MG/1
25 TABLET, FILM COATED ORAL 3 TIMES DAILY
Status: DISCONTINUED | OUTPATIENT
Start: 2017-01-01 | End: 2017-01-01 | Stop reason: HOSPADM

## 2017-01-01 RX ORDER — ASPIRIN 81 MG/1
81 TABLET ORAL DAILY
Qty: 30 TAB | Refills: 0 | Status: SHIPPED
Start: 2017-01-01

## 2017-01-01 RX ORDER — CLONIDINE HYDROCHLORIDE 0.1 MG/1
0.1 TABLET ORAL EVERY 8 HOURS
Status: DISCONTINUED | OUTPATIENT
Start: 2017-01-01 | End: 2017-01-01

## 2017-01-01 RX ORDER — CARVEDILOL 6.25 MG/1
6.25 TABLET ORAL EVERY 12 HOURS
Status: DISCONTINUED | OUTPATIENT
Start: 2017-01-01 | End: 2017-01-01 | Stop reason: HOSPADM

## 2017-01-01 RX ORDER — ONDANSETRON 2 MG/ML
4 INJECTION INTRAMUSCULAR; INTRAVENOUS
Status: COMPLETED | OUTPATIENT
Start: 2017-01-01 | End: 2017-01-01

## 2017-01-01 RX ORDER — FENTANYL CITRATE 50 UG/ML
12.5-5 INJECTION, SOLUTION INTRAMUSCULAR; INTRAVENOUS
Status: DISCONTINUED | OUTPATIENT
Start: 2017-01-01 | End: 2017-01-01

## 2017-01-01 RX ORDER — CLONIDINE HYDROCHLORIDE 0.1 MG/1
0.2 TABLET ORAL EVERY 8 HOURS
Status: DISCONTINUED | OUTPATIENT
Start: 2017-01-01 | End: 2017-01-01 | Stop reason: HOSPADM

## 2017-01-01 RX ORDER — METOPROLOL TARTRATE 25 MG/1
25 TABLET, FILM COATED ORAL 2 TIMES DAILY
Status: DISCONTINUED | OUTPATIENT
Start: 2017-01-01 | End: 2017-01-01

## 2017-01-01 RX ORDER — ASPIRIN 81 MG/1
81 TABLET ORAL DAILY
Status: DISCONTINUED | OUTPATIENT
Start: 2017-01-01 | End: 2017-01-01 | Stop reason: HOSPADM

## 2017-01-01 RX ORDER — SODIUM CHLORIDE 9 MG/ML
250 INJECTION, SOLUTION INTRAVENOUS AS NEEDED
Status: DISCONTINUED | OUTPATIENT
Start: 2017-01-01 | End: 2017-12-18 | Stop reason: HOSPADM

## 2017-01-01 RX ORDER — FUROSEMIDE 20 MG/1
20 TABLET ORAL DAILY
Status: DISCONTINUED | OUTPATIENT
Start: 2017-01-01 | End: 2017-01-01

## 2017-01-01 RX ORDER — POTASSIUM CHLORIDE 1.5 G/1.77G
40 POWDER, FOR SOLUTION ORAL 2 TIMES DAILY WITH MEALS
Status: DISCONTINUED | OUTPATIENT
Start: 2017-01-01 | End: 2017-01-01 | Stop reason: HOSPADM

## 2017-01-01 RX ORDER — LIDOCAINE HYDROCHLORIDE 10 MG/ML
INJECTION, SOLUTION EPIDURAL; INFILTRATION; INTRACAUDAL; PERINEURAL
Status: COMPLETED
Start: 2017-01-01 | End: 2017-01-01

## 2017-01-01 RX ORDER — ALPRAZOLAM 0.5 MG/1
0.5 TABLET ORAL
Status: COMPLETED | OUTPATIENT
Start: 2017-01-01 | End: 2017-01-01

## 2017-01-01 RX ORDER — DEXTROSE 50 % IN WATER (D50W) INTRAVENOUS SYRINGE
Status: COMPLETED
Start: 2017-01-01 | End: 2017-01-01

## 2017-01-01 RX ORDER — FAMOTIDINE 20 MG/1
20 TABLET, FILM COATED ORAL DAILY
Status: DISCONTINUED | OUTPATIENT
Start: 2017-01-01 | End: 2017-01-01 | Stop reason: HOSPADM

## 2017-01-01 RX ORDER — MIDAZOLAM HYDROCHLORIDE 1 MG/ML
INJECTION, SOLUTION INTRAMUSCULAR; INTRAVENOUS
Status: COMPLETED
Start: 2017-01-01 | End: 2017-01-01

## 2017-01-01 RX ORDER — LEVOFLOXACIN 5 MG/ML
750 INJECTION, SOLUTION INTRAVENOUS EVERY 24 HOURS
Status: DISCONTINUED | OUTPATIENT
Start: 2017-01-01 | End: 2017-01-01

## 2017-01-01 RX ORDER — FUROSEMIDE 10 MG/ML
60 INJECTION INTRAMUSCULAR; INTRAVENOUS
Status: COMPLETED | OUTPATIENT
Start: 2017-01-01 | End: 2017-01-01

## 2017-01-01 RX ORDER — ONDANSETRON 2 MG/ML
4 INJECTION INTRAMUSCULAR; INTRAVENOUS
Status: DISCONTINUED | OUTPATIENT
Start: 2017-01-01 | End: 2017-12-18 | Stop reason: HOSPADM

## 2017-01-01 RX ORDER — THERA TABS 400 MCG
1 TAB ORAL DAILY
Status: DISCONTINUED | OUTPATIENT
Start: 2017-01-01 | End: 2017-01-01 | Stop reason: HOSPADM

## 2017-01-01 RX ORDER — GUAIFENESIN 100 MG/5ML
324 LIQUID (ML) ORAL
Status: COMPLETED | OUTPATIENT
Start: 2017-01-01 | End: 2017-01-01

## 2017-01-01 RX ORDER — ALBUMIN HUMAN 250 G/1000ML
25 SOLUTION INTRAVENOUS ONCE
Status: COMPLETED | OUTPATIENT
Start: 2017-01-01 | End: 2017-01-01

## 2017-01-01 RX ORDER — POTASSIUM CHLORIDE 20 MEQ/1
20 TABLET, EXTENDED RELEASE ORAL
Status: COMPLETED | OUTPATIENT
Start: 2017-01-01 | End: 2017-01-01

## 2017-01-01 RX ORDER — METRONIDAZOLE 500 MG/100ML
500 INJECTION, SOLUTION INTRAVENOUS EVERY 6 HOURS
Status: DISCONTINUED | OUTPATIENT
Start: 2017-01-01 | End: 2017-12-18 | Stop reason: HOSPADM

## 2017-01-01 RX ORDER — MORPHINE SULFATE 4 MG/ML
4 INJECTION, SOLUTION INTRAMUSCULAR; INTRAVENOUS ONCE
Status: COMPLETED | OUTPATIENT
Start: 2017-01-01 | End: 2017-01-01

## 2017-01-01 RX ORDER — INSULIN LISPRO 100 [IU]/ML
INJECTION, SOLUTION INTRAVENOUS; SUBCUTANEOUS
Status: DISCONTINUED | OUTPATIENT
Start: 2017-01-01 | End: 2017-01-01 | Stop reason: HOSPADM

## 2017-01-01 RX ORDER — SODIUM CHLORIDE 9 MG/ML
100 INJECTION, SOLUTION INTRAVENOUS CONTINUOUS
Status: DISPENSED | OUTPATIENT
Start: 2017-01-01 | End: 2017-01-01

## 2017-01-01 RX ORDER — ASPIRIN 325 MG
325 TABLET ORAL
Status: COMPLETED | OUTPATIENT
Start: 2017-01-01 | End: 2017-01-01

## 2017-01-01 RX ORDER — ALBUMIN HUMAN 250 G/1000ML
SOLUTION INTRAVENOUS
Status: COMPLETED
Start: 2017-01-01 | End: 2017-01-01

## 2017-01-01 RX ORDER — MAGNESIUM SULFATE 100 %
4 CRYSTALS MISCELLANEOUS AS NEEDED
Status: DISCONTINUED | OUTPATIENT
Start: 2017-01-01 | End: 2017-12-18 | Stop reason: HOSPADM

## 2017-01-01 RX ORDER — MAGNESIUM SULFATE 100 %
4 CRYSTALS MISCELLANEOUS AS NEEDED
Status: DISCONTINUED | OUTPATIENT
Start: 2017-01-01 | End: 2017-01-01 | Stop reason: HOSPADM

## 2017-01-01 RX ORDER — MIDAZOLAM HYDROCHLORIDE 1 MG/ML
1 INJECTION, SOLUTION INTRAMUSCULAR; INTRAVENOUS
Status: DISCONTINUED | OUTPATIENT
Start: 2017-01-01 | End: 2017-01-01

## 2017-01-01 RX ORDER — HYDROCHLOROTHIAZIDE 25 MG/1
25 TABLET ORAL DAILY
Qty: 30 TAB | Refills: 0 | Status: SHIPPED
Start: 2017-01-01

## 2017-01-01 RX ORDER — CALCITRIOL 0.25 UG/1
0.5 CAPSULE ORAL DAILY
Status: DISCONTINUED | OUTPATIENT
Start: 2017-12-18 | End: 2017-12-18 | Stop reason: HOSPADM

## 2017-01-01 RX ORDER — HYDRALAZINE HYDROCHLORIDE 25 MG/1
25 TABLET, FILM COATED ORAL
Status: DISCONTINUED | OUTPATIENT
Start: 2017-01-01 | End: 2017-01-01 | Stop reason: HOSPADM

## 2017-01-01 RX ORDER — METOPROLOL TARTRATE 25 MG/1
25 TABLET, FILM COATED ORAL EVERY 12 HOURS
Status: DISCONTINUED | OUTPATIENT
Start: 2017-01-01 | End: 2017-01-01

## 2017-01-01 RX ORDER — SODIUM CHLORIDE 9 MG/ML
100 INJECTION, SOLUTION INTRAVENOUS
Status: DISCONTINUED | OUTPATIENT
Start: 2017-01-01 | End: 2017-12-18 | Stop reason: HOSPADM

## 2017-01-01 RX ORDER — FAMOTIDINE 20 MG/1
20 TABLET, FILM COATED ORAL 2 TIMES DAILY
Status: DISCONTINUED | OUTPATIENT
Start: 2017-01-01 | End: 2017-01-01

## 2017-01-01 RX ORDER — HEPARIN SODIUM 5000 [USP'U]/ML
5000 INJECTION, SOLUTION INTRAVENOUS; SUBCUTANEOUS EVERY 8 HOURS
Status: DISCONTINUED | OUTPATIENT
Start: 2017-01-01 | End: 2017-01-01

## 2017-01-01 RX ORDER — DEXTROSE 50 % IN WATER (D50W) INTRAVENOUS SYRINGE
25-50 AS NEEDED
Status: DISCONTINUED | OUTPATIENT
Start: 2017-01-01 | End: 2017-01-01 | Stop reason: HOSPADM

## 2017-01-01 RX ORDER — LISINOPRIL 10 MG/1
10 TABLET ORAL DAILY
Status: DISCONTINUED | OUTPATIENT
Start: 2017-01-01 | End: 2017-01-01 | Stop reason: HOSPADM

## 2017-01-01 RX ORDER — METOPROLOL TARTRATE 50 MG/1
50 TABLET ORAL
Status: COMPLETED | OUTPATIENT
Start: 2017-01-01 | End: 2017-01-01

## 2017-01-01 RX ORDER — ACETAMINOPHEN 500 MG
500 TABLET ORAL
Status: COMPLETED | OUTPATIENT
Start: 2017-01-01 | End: 2017-01-01

## 2017-01-01 RX ORDER — LISINOPRIL 10 MG/1
10 TABLET ORAL DAILY
Qty: 30 TAB | Refills: 0 | Status: SHIPPED
Start: 2017-01-01

## 2017-01-01 RX ADMIN — INSULIN LISPRO 4 UNITS: 100 INJECTION, SOLUTION INTRAVENOUS; SUBCUTANEOUS at 22:29

## 2017-01-01 RX ADMIN — ALPRAZOLAM 0.5 MG: 0.5 TABLET ORAL at 00:42

## 2017-01-01 RX ADMIN — CLONIDINE HYDROCHLORIDE 0.2 MG: 0.1 TABLET ORAL at 05:30

## 2017-01-01 RX ADMIN — METRONIDAZOLE 500 MG: 500 INJECTION, SOLUTION INTRAVENOUS at 01:11

## 2017-01-01 RX ADMIN — ONDANSETRON 4 MG: 2 INJECTION INTRAMUSCULAR; INTRAVENOUS at 12:33

## 2017-01-01 RX ADMIN — ALBUMIN (HUMAN) 25 G: 0.25 INJECTION, SOLUTION INTRAVENOUS at 13:10

## 2017-01-01 RX ADMIN — VANCOMYCIN HYDROCHLORIDE 500 MG: 500 INJECTION, POWDER, LYOPHILIZED, FOR SOLUTION INTRAVENOUS at 16:51

## 2017-01-01 RX ADMIN — HYDRALAZINE HYDROCHLORIDE 25 MG: 25 TABLET, FILM COATED ORAL at 08:19

## 2017-01-01 RX ADMIN — VANCOMYCIN HYDROCHLORIDE 500 MG: 500 INJECTION, POWDER, LYOPHILIZED, FOR SOLUTION INTRAVENOUS at 09:00

## 2017-01-01 RX ADMIN — METOPROLOL TARTRATE 25 MG: 25 TABLET ORAL at 10:16

## 2017-01-01 RX ADMIN — SODIUM CHLORIDE 500 MG: 900 INJECTION, SOLUTION INTRAVENOUS at 16:33

## 2017-01-01 RX ADMIN — FUROSEMIDE 60 MG: 10 INJECTION, SOLUTION INTRAMUSCULAR; INTRAVENOUS at 10:57

## 2017-01-01 RX ADMIN — HYDRALAZINE HYDROCHLORIDE 25 MG: 25 TABLET, FILM COATED ORAL at 04:55

## 2017-01-01 RX ADMIN — SODIUM CHLORIDE 1000 ML: 900 INJECTION, SOLUTION INTRAVENOUS at 16:06

## 2017-01-01 RX ADMIN — METOPROLOL TARTRATE 25 MG: 25 TABLET ORAL at 18:48

## 2017-01-01 RX ADMIN — WATER 2 G: 1 INJECTION INTRAMUSCULAR; INTRAVENOUS; SUBCUTANEOUS at 18:35

## 2017-01-01 RX ADMIN — NITROGLYCERIN 1 INCH: 20 OINTMENT TOPICAL at 11:09

## 2017-01-01 RX ADMIN — FAMOTIDINE 20 MG: 20 TABLET, FILM COATED ORAL at 08:16

## 2017-01-01 RX ADMIN — MIDAZOLAM HYDROCHLORIDE 1 MG: 1 INJECTION, SOLUTION INTRAMUSCULAR; INTRAVENOUS at 10:25

## 2017-01-01 RX ADMIN — METOPROLOL TARTRATE 50 MG: 50 TABLET ORAL at 17:25

## 2017-01-01 RX ADMIN — POTASSIUM CHLORIDE 40 MEQ: 1.5 POWDER, FOR SOLUTION ORAL at 15:10

## 2017-01-01 RX ADMIN — HYDRALAZINE HYDROCHLORIDE 25 MG: 25 TABLET, FILM COATED ORAL at 03:48

## 2017-01-01 RX ADMIN — DEXTROSE AND SODIUM CHLORIDE 125 ML/HR: 5; 450 INJECTION, SOLUTION INTRAVENOUS at 10:36

## 2017-01-01 RX ADMIN — FENTANYL CITRATE 50 MCG: 50 INJECTION INTRAMUSCULAR; INTRAVENOUS at 10:25

## 2017-01-01 RX ADMIN — METRONIDAZOLE 500 MG: 500 INJECTION, SOLUTION INTRAVENOUS at 10:42

## 2017-01-01 RX ADMIN — ASPIRIN 81 MG CHEWABLE TABLET 324 MG: 81 TABLET CHEWABLE at 11:08

## 2017-01-01 RX ADMIN — METRONIDAZOLE 500 MG: 500 INJECTION, SOLUTION INTRAVENOUS at 17:20

## 2017-01-01 RX ADMIN — WATER 2 G: 1 INJECTION INTRAMUSCULAR; INTRAVENOUS; SUBCUTANEOUS at 18:46

## 2017-01-01 RX ADMIN — FELODIPINE 10 MG: 5 TABLET, FILM COATED, EXTENDED RELEASE ORAL at 08:18

## 2017-01-01 RX ADMIN — THERA TABS 1 TABLET: TAB at 08:16

## 2017-01-01 RX ADMIN — HYDRALAZINE HYDROCHLORIDE 25 MG: 25 TABLET, FILM COATED ORAL at 08:30

## 2017-01-01 RX ADMIN — CLONIDINE HYDROCHLORIDE 0.2 MG: 0.1 TABLET ORAL at 22:35

## 2017-01-01 RX ADMIN — ASPIRIN 81 MG 324 MG: 81 TABLET ORAL at 20:03

## 2017-01-01 RX ADMIN — FAMOTIDINE 20 MG: 10 INJECTION, SOLUTION INTRAVENOUS at 09:22

## 2017-01-01 RX ADMIN — METRONIDAZOLE 500 MG: 500 INJECTION, SOLUTION INTRAVENOUS at 11:15

## 2017-01-01 RX ADMIN — CLONIDINE HYDROCHLORIDE 0.1 MG: 0.1 TABLET ORAL at 16:32

## 2017-01-01 RX ADMIN — ASPIRIN 81 MG: 81 TABLET, COATED ORAL at 08:19

## 2017-01-01 RX ADMIN — CLONIDINE HYDROCHLORIDE 0.1 MG: 0.1 TABLET ORAL at 22:27

## 2017-01-01 RX ADMIN — METOPROLOL TARTRATE 25 MG: 25 TABLET ORAL at 22:35

## 2017-01-01 RX ADMIN — Medication 5 MCG/MIN: at 15:18

## 2017-01-01 RX ADMIN — CALCIUM ACETATE 1334 MG: 667 SOLUTION ORAL at 18:32

## 2017-01-01 RX ADMIN — FUROSEMIDE 40 MG: 10 INJECTION, SOLUTION INTRAMUSCULAR; INTRAVENOUS at 20:06

## 2017-01-01 RX ADMIN — THERA TABS 1 TABLET: TAB at 08:26

## 2017-01-01 RX ADMIN — REGADENOSON 0.4 MG: 0.08 INJECTION, SOLUTION INTRAVENOUS at 08:30

## 2017-01-01 RX ADMIN — CALCIUM ACETATE 1334 MG: 667 SOLUTION ORAL at 12:49

## 2017-01-01 RX ADMIN — SODIUM CHLORIDE 1000 MG: 900 INJECTION, SOLUTION INTRAVENOUS at 23:44

## 2017-01-01 RX ADMIN — METRONIDAZOLE 500 MG: 500 INJECTION, SOLUTION INTRAVENOUS at 06:48

## 2017-01-01 RX ADMIN — Medication 6 MCG/MIN: at 15:25

## 2017-01-01 RX ADMIN — CALCIUM GLUCONATE 2 G: 94 INJECTION, SOLUTION INTRAVENOUS at 09:22

## 2017-01-01 RX ADMIN — Medication 2 MG: at 11:20

## 2017-01-01 RX ADMIN — POTASSIUM CHLORIDE 20 MEQ: 1500 TABLET, EXTENDED RELEASE ORAL at 17:34

## 2017-01-01 RX ADMIN — INSULIN LISPRO 2 UNITS: 100 INJECTION, SOLUTION INTRAVENOUS; SUBCUTANEOUS at 16:30

## 2017-01-01 RX ADMIN — FENTANYL CITRATE 25 MCG: 50 INJECTION, SOLUTION INTRAMUSCULAR; INTRAVENOUS at 10:30

## 2017-01-01 RX ADMIN — MORPHINE SULFATE 4 MG: 4 INJECTION, SOLUTION INTRAMUSCULAR; INTRAVENOUS at 15:04

## 2017-01-01 RX ADMIN — FAMOTIDINE 20 MG: 20 TABLET, FILM COATED ORAL at 11:00

## 2017-01-01 RX ADMIN — DEXTROSE AND SODIUM CHLORIDE 80 ML/HR: 5; 450 INJECTION, SOLUTION INTRAVENOUS at 23:35

## 2017-01-01 RX ADMIN — METRONIDAZOLE 500 MG: 500 INJECTION, SOLUTION INTRAVENOUS at 11:20

## 2017-01-01 RX ADMIN — ASPIRIN 325 MG: 325 TABLET ORAL at 17:13

## 2017-01-01 RX ADMIN — Medication 8 MCG/MIN: at 15:34

## 2017-01-01 RX ADMIN — Medication 9 MCG/MIN: at 08:51

## 2017-01-01 RX ADMIN — GELATIN ABSORBABLE SPONGE 12-7 MM: 12-7 MISC at 10:39

## 2017-01-01 RX ADMIN — HYDRALAZINE HYDROCHLORIDE 25 MG: 25 TABLET, FILM COATED ORAL at 23:15

## 2017-01-01 RX ADMIN — Medication 8 MCG/MIN: at 19:48

## 2017-01-01 RX ADMIN — DEXTROSE AND SODIUM CHLORIDE 80 ML/HR: 5; 450 INJECTION, SOLUTION INTRAVENOUS at 17:13

## 2017-01-01 RX ADMIN — LISINOPRIL 10 MG: 10 TABLET ORAL at 08:30

## 2017-01-01 RX ADMIN — Medication 4 MCG/MIN: at 01:12

## 2017-01-01 RX ADMIN — ALBUMIN HUMAN 25 G: 250 SOLUTION INTRAVENOUS at 16:35

## 2017-01-01 RX ADMIN — ACETAMINOPHEN 650 MG: 325 TABLET ORAL at 19:58

## 2017-01-01 RX ADMIN — INSULIN LISPRO 6 UNITS: 100 INJECTION, SOLUTION INTRAVENOUS; SUBCUTANEOUS at 13:11

## 2017-01-01 RX ADMIN — INSULIN LISPRO 2 UNITS: 100 INJECTION, SOLUTION INTRAVENOUS; SUBCUTANEOUS at 08:16

## 2017-01-01 RX ADMIN — SODIUM CHLORIDE 1000 ML: 900 INJECTION, SOLUTION INTRAVENOUS at 17:14

## 2017-01-01 RX ADMIN — SODIUM CHLORIDE 500 MG: 900 INJECTION, SOLUTION INTRAVENOUS at 16:06

## 2017-01-01 RX ADMIN — Medication 8 MCG/MIN: at 19:19

## 2017-01-01 RX ADMIN — FENTANYL CITRATE 25 MCG: 50 INJECTION, SOLUTION INTRAMUSCULAR; INTRAVENOUS at 10:35

## 2017-01-01 RX ADMIN — DEXTROSE AND SODIUM CHLORIDE 80 ML/HR: 5; 450 INJECTION, SOLUTION INTRAVENOUS at 17:08

## 2017-01-01 RX ADMIN — METRONIDAZOLE 500 MG: 500 INJECTION, SOLUTION INTRAVENOUS at 23:41

## 2017-01-01 RX ADMIN — DEXTROSE MONOHYDRATE 25 G: 25 INJECTION, SOLUTION INTRAVENOUS at 17:56

## 2017-01-01 RX ADMIN — INSULIN LISPRO 2 UNITS: 100 INJECTION, SOLUTION INTRAVENOUS; SUBCUTANEOUS at 22:08

## 2017-01-01 RX ADMIN — HYDROCHLOROTHIAZIDE 25 MG: 25 TABLET ORAL at 08:30

## 2017-01-01 RX ADMIN — SODIUM CHLORIDE 1000 ML: 900 INJECTION, SOLUTION INTRAVENOUS at 17:13

## 2017-01-01 RX ADMIN — THERA TABS 1 TABLET: TAB at 08:44

## 2017-01-01 RX ADMIN — DEXTROSE AND SODIUM CHLORIDE 125 ML/HR: 5; 450 INJECTION, SOLUTION INTRAVENOUS at 19:16

## 2017-01-01 RX ADMIN — POTASSIUM CHLORIDE 40 MEQ: 1.5 POWDER, FOR SOLUTION ORAL at 16:06

## 2017-01-01 RX ADMIN — MIDAZOLAM HYDROCHLORIDE 0.5 MG: 1 INJECTION, SOLUTION INTRAMUSCULAR; INTRAVENOUS at 10:30

## 2017-01-01 RX ADMIN — LEVOFLOXACIN 750 MG: 5 INJECTION, SOLUTION INTRAVENOUS at 18:32

## 2017-01-01 RX ADMIN — LIDOCAINE HYDROCHLORIDE: 10 INJECTION, SOLUTION EPIDURAL; INFILTRATION; INTRACAUDAL; PERINEURAL at 09:22

## 2017-01-01 RX ADMIN — ASPIRIN 81 MG: 81 TABLET, COATED ORAL at 10:20

## 2017-01-01 RX ADMIN — CALCIUM GLUCONATE 2 G: 94 INJECTION, SOLUTION INTRAVENOUS at 10:30

## 2017-01-01 RX ADMIN — HYDROCHLOROTHIAZIDE 25 MG: 25 TABLET ORAL at 10:16

## 2017-01-01 RX ADMIN — CLONIDINE HYDROCHLORIDE 0.1 MG: 0.1 TABLET ORAL at 05:17

## 2017-01-01 RX ADMIN — ALBUMIN (HUMAN) 25 G: 0.25 INJECTION, SOLUTION INTRAVENOUS at 16:35

## 2017-01-01 RX ADMIN — HYDROCHLOROTHIAZIDE 25 MG: 25 TABLET ORAL at 08:19

## 2017-01-01 RX ADMIN — LIDOCAINE HYDROCHLORIDE: 10 INJECTION, SOLUTION EPIDURAL; INFILTRATION; INTRACAUDAL; PERINEURAL at 08:17

## 2017-01-01 RX ADMIN — LISINOPRIL 10 MG: 10 TABLET ORAL at 08:19

## 2017-01-01 RX ADMIN — ASPIRIN 81 MG: 81 TABLET, COATED ORAL at 13:16

## 2017-01-01 RX ADMIN — CLONIDINE HYDROCHLORIDE 0.2 MG: 0.1 TABLET ORAL at 06:48

## 2017-01-01 RX ADMIN — SODIUM CHLORIDE 150 ML/HR: 900 INJECTION, SOLUTION INTRAVENOUS at 11:16

## 2017-01-01 RX ADMIN — FENTANYL CITRATE 50 MCG: 50 INJECTION, SOLUTION INTRAMUSCULAR; INTRAVENOUS at 10:25

## 2017-01-01 RX ADMIN — METRONIDAZOLE 500 MG: 500 INJECTION, SOLUTION INTRAVENOUS at 16:36

## 2017-01-01 RX ADMIN — ACETAMINOPHEN 650 MG: 325 TABLET, FILM COATED ORAL at 00:07

## 2017-01-01 RX ADMIN — WATER 1 G: 1 INJECTION INTRAMUSCULAR; INTRAVENOUS; SUBCUTANEOUS at 19:16

## 2017-01-01 RX ADMIN — WATER 2 G: 1 INJECTION INTRAMUSCULAR; INTRAVENOUS; SUBCUTANEOUS at 17:33

## 2017-01-01 RX ADMIN — DEXTROSE 50 % IN WATER (D50W) INTRAVENOUS SYRINGE 25 G: at 17:56

## 2017-01-01 RX ADMIN — FELODIPINE 10 MG: 5 TABLET, FILM COATED, EXTENDED RELEASE ORAL at 08:30

## 2017-01-01 RX ADMIN — ACETAMINOPHEN 500 MG: 500 TABLET ORAL at 11:30

## 2017-01-01 RX ADMIN — INSULIN LISPRO 2 UNITS: 100 INJECTION, SOLUTION INTRAVENOUS; SUBCUTANEOUS at 18:43

## 2017-01-01 RX ADMIN — CARVEDILOL 6.25 MG: 6.25 TABLET, FILM COATED ORAL at 20:37

## 2017-01-01 RX ADMIN — LISINOPRIL 10 MG: 10 TABLET ORAL at 10:20

## 2017-01-01 RX ADMIN — FAMOTIDINE 20 MG: 10 INJECTION, SOLUTION INTRAVENOUS at 09:00

## 2017-01-01 RX ADMIN — Medication 12 MCG/MIN: at 18:28

## 2017-01-01 RX ADMIN — SODIUM CHLORIDE 250 ML: 9 INJECTION, SOLUTION INTRAVENOUS at 19:22

## 2017-01-01 RX ADMIN — INSULIN LISPRO 2 UNITS: 100 INJECTION, SOLUTION INTRAVENOUS; SUBCUTANEOUS at 12:29

## 2017-01-01 RX ADMIN — FAMOTIDINE 20 MG: 20 TABLET, FILM COATED ORAL at 08:44

## 2017-01-01 RX ADMIN — ACETAMINOPHEN 650 MG: 325 TABLET ORAL at 12:35

## 2017-01-01 RX ADMIN — FUROSEMIDE 60 MG: 10 INJECTION, SOLUTION INTRAMUSCULAR; INTRAVENOUS at 13:31

## 2017-01-01 RX ADMIN — ACETAMINOPHEN 650 MG: 325 TABLET ORAL at 17:38

## 2017-01-01 RX ADMIN — LIDOCAINE HYDROCHLORIDE: 10 INJECTION, SOLUTION EPIDURAL; INFILTRATION; INTRACAUDAL; PERINEURAL at 15:00

## 2017-01-01 RX ADMIN — ACETAMINOPHEN 650 MG: 325 TABLET ORAL at 11:00

## 2017-01-01 RX ADMIN — FAMOTIDINE 20 MG: 10 INJECTION, SOLUTION INTRAVENOUS at 10:31

## 2017-01-01 RX ADMIN — CALCIUM GLUCONATE 1 G: 94 INJECTION, SOLUTION INTRAVENOUS at 00:51

## 2017-01-01 RX ADMIN — SODIUM CHLORIDE 1000 ML: 900 INJECTION, SOLUTION INTRAVENOUS at 18:06

## 2017-01-01 RX ADMIN — INSULIN LISPRO 2 UNITS: 100 INJECTION, SOLUTION INTRAVENOUS; SUBCUTANEOUS at 02:00

## 2017-01-01 RX ADMIN — FAMOTIDINE 20 MG: 20 TABLET, FILM COATED ORAL at 08:26

## 2017-01-01 RX ADMIN — HYDROCHLOROTHIAZIDE 25 MG: 25 TABLET ORAL at 18:48

## 2017-01-01 RX ADMIN — INSULIN LISPRO 2 UNITS: 100 INJECTION, SOLUTION INTRAVENOUS; SUBCUTANEOUS at 01:07

## 2017-01-01 RX ADMIN — Medication 10 MCG/MIN: at 16:55

## 2017-01-01 RX ADMIN — Medication 10 MCG/MIN: at 20:45

## 2017-01-01 RX ADMIN — FAMOTIDINE 20 MG: 20 TABLET, FILM COATED ORAL at 09:31

## 2017-01-01 RX ADMIN — METRONIDAZOLE 500 MG: 500 INJECTION, SOLUTION INTRAVENOUS at 06:30

## 2017-01-01 RX ADMIN — Medication 8 MCG/MIN: at 02:02

## 2017-01-01 RX ADMIN — FUROSEMIDE 20 MG: 20 TABLET ORAL at 11:00

## 2017-01-01 RX ADMIN — CEFTRIAXONE SODIUM 1 G: 1 INJECTION, POWDER, FOR SOLUTION INTRAMUSCULAR; INTRAVENOUS at 17:27

## 2017-01-01 RX ADMIN — SODIUM CHLORIDE 1000 ML: 900 INJECTION, SOLUTION INTRAVENOUS at 10:54

## 2017-01-01 RX ADMIN — PHYTONADIONE 10 MG: 10 INJECTION, EMULSION INTRAMUSCULAR; INTRAVENOUS; SUBCUTANEOUS at 11:15

## 2017-01-01 RX ADMIN — HYDRALAZINE HYDROCHLORIDE 25 MG: 25 TABLET, FILM COATED ORAL at 22:36

## 2017-01-01 RX ADMIN — ONDANSETRON 4 MG: 2 INJECTION INTRAMUSCULAR; INTRAVENOUS at 10:54

## 2017-01-01 RX ADMIN — DEXTROSE MONOHYDRATE 25 G: 25 INJECTION, SOLUTION INTRAVENOUS at 16:35

## 2017-01-01 RX ADMIN — DEXTROSE MONOHYDRATE 12.5 G: 25 INJECTION, SOLUTION INTRAVENOUS at 18:38

## 2017-01-01 RX ADMIN — METRONIDAZOLE 500 MG: 500 INJECTION, SOLUTION INTRAVENOUS at 18:37

## 2017-01-01 RX ADMIN — MIDAZOLAM HYDROCHLORIDE 0.5 MG: 1 INJECTION, SOLUTION INTRAMUSCULAR; INTRAVENOUS at 10:35

## 2017-01-01 RX ADMIN — FELODIPINE 10 MG: 5 TABLET, FILM COATED, EXTENDED RELEASE ORAL at 16:35

## 2017-01-01 RX ADMIN — LIDOCAINE HYDROCHLORIDE: 10 INJECTION, SOLUTION EPIDURAL; INFILTRATION; INTRACAUDAL; PERINEURAL at 10:23

## 2017-01-01 RX ADMIN — DEXTROSE AND SODIUM CHLORIDE 125 ML/HR: 5; 450 INJECTION, SOLUTION INTRAVENOUS at 21:52

## 2017-01-01 RX ADMIN — VANCOMYCIN HYDROCHLORIDE 600 MG: 10 INJECTION, POWDER, LYOPHILIZED, FOR SOLUTION INTRAVENOUS at 21:49

## 2017-01-01 RX ADMIN — CARVEDILOL 6.25 MG: 6.25 TABLET, FILM COATED ORAL at 08:30

## 2017-01-01 RX ADMIN — POTASSIUM CHLORIDE 40 MEQ: 1.5 POWDER, FOR SOLUTION ORAL at 08:30

## 2017-01-01 RX ADMIN — CARVEDILOL 6.25 MG: 6.25 TABLET, FILM COATED ORAL at 10:25

## 2017-04-24 PROBLEM — N18.30 CKD (CHRONIC KIDNEY DISEASE) STAGE 3, GFR 30-59 ML/MIN (HCC): Status: ACTIVE | Noted: 2017-01-01

## 2017-04-24 PROBLEM — I10 HYPERTENSION: Status: ACTIVE | Noted: 2017-01-01

## 2017-04-24 PROBLEM — R07.9 CHEST PAIN AT REST: Status: ACTIVE | Noted: 2017-01-01

## 2017-04-24 NOTE — ED PROVIDER NOTES
HPI Comments: Pt presents with chest pain lasting 10 minutes. Intermittent with sob. Non exertional,hx of the same when patient is not taking her med. He could not fill his medication as they were too expensive. Symptoms are for last 4 days. Few times a day. No chest pain or shortness of breath now. No vomiting or diarrhea  No hx of blood clot  Hx of chronic headaches and patient has similar episode today. No blurry vision. No hx of DVT or PE, no calf pain, no swelling , and no recent traveling. Patient is a 80 y.o. male presenting with hypertension, cough, fever, and dizziness. The history is provided by the patient. Hypertension    This is a recurrent problem. The current episode started less than 1 hour ago. The problem has been resolved. Associated symptoms include chest pain, headaches, dizziness and shortness of breath. Pertinent negatives include no palpitations, no confusion and no nausea. There are no associated agents to hypertension. Risk factors include hypertension and diabetes mellitus. Cough   Associated symptoms include chest pain, headaches and shortness of breath. Pertinent negatives include no chills, no ear pain, no myalgias, no wheezing, no nausea and no confusion. Fever    Associated symptoms include chest pain, headaches, cough and shortness of breath. Pertinent negatives include no diarrhea and no congestion. Dizziness    Associated symptoms include chest pain, a fever, headaches and dizziness. Pertinent negatives include no palpitations, no confusion, no diaphoresis, no abdominal pain, no nausea, no congestion, no back pain, no light-headedness and no seizures. Past Medical History:   Diagnosis Date    Diabetes ([address]     Hypertension        Past Surgical History:   Procedure Laterality Date    NEUROLOGICAL PROCEDURE UNLISTED           No family history on file.     Social History     Social History    Marital status: SINGLE     Spouse name: N/A    Number of children: N/A    Years of education: N/A     Occupational History    Not on file. Social History Main Topics    Smoking status: Current Every Day Smoker     Packs/day: 0.25    Smokeless tobacco: Not on file    Alcohol use No    Drug use: Not on file    Sexual activity: Not on file     Other Topics Concern    Not on file     Social History Narrative    No narrative on file         ALLERGIES: Review of patient's allergies indicates no known allergies. Review of Systems   Constitutional: Positive for fever. Negative for activity change, appetite change, chills and diaphoresis. HENT: Negative for congestion, dental problem, drooling, ear discharge, ear pain, facial swelling, hearing loss and mouth sores. Eyes: Negative for pain and itching. Respiratory: Positive for cough and shortness of breath. Negative for apnea, choking, chest tightness, wheezing and stridor. Cardiovascular: Positive for chest pain. Negative for palpitations and leg swelling. Gastrointestinal: Negative for abdominal distention, abdominal pain, blood in stool, constipation, diarrhea and nausea. Endocrine: Negative for cold intolerance and heat intolerance. Genitourinary: Negative for difficulty urinating, dysuria, enuresis and flank pain. Musculoskeletal: Negative for arthralgias, back pain, gait problem, joint swelling and myalgias. Skin: Negative for color change. Allergic/Immunologic: Negative for environmental allergies. Neurological: Positive for dizziness and headaches. Negative for seizures, facial asymmetry, speech difficulty, light-headedness and numbness. Psychiatric/Behavioral: Negative for agitation, behavioral problems, confusion and decreased concentration.        Vitals:    04/24/17 0932 04/24/17 1005 04/24/17 1010 04/24/17 1108   BP: (!) 215/125 (!) 201/118  (!) 195/103   Pulse: 69 60     Resp: 18 16     Temp: 97.8 °F (36.6 °C) 98.5 °F (36.9 °C)     SpO2: 99% 96% 96%    Weight: 68 kg (150 lb) Height: 5' 5\" (1.651 m)               Physical Exam   Constitutional: He is oriented to person, place, and time. He appears well-developed and well-nourished. No distress. HENT:   Head: Normocephalic and atraumatic. Eyes: Conjunctivae and EOM are normal. Pupils are equal, round, and reactive to light. Neck: Neck supple. Cardiovascular: Normal rate, regular rhythm and normal heart sounds. Pulmonary/Chest: Effort normal and breath sounds normal.   Abdominal: Soft. Bowel sounds are normal.   Musculoskeletal: Normal range of motion. Neurological: He is alert and oriented to person, place, and time. He has normal reflexes. Skin: Skin is warm and dry. He is not diaphoretic. Psychiatric: He has a normal mood and affect. His behavior is normal. Judgment and thought content normal.        MDM  Number of Diagnoses or Management Options  Chest pain, unspecified type:   Nonintractable headache, unspecified chronicity pattern, unspecified headache type:   SOB (shortness of breath):   Diagnosis management comments: Pt with no specific chest pain and SOB. Pt. With hx of dm/htn. Uncontrolled htn  Pt without any prior cardiac workup in the past per EMR.         ED Course       Procedures      Vitals:  Patient Vitals for the past 12 hrs:   Temp Pulse Resp BP SpO2   04/24/17 1108 - - - (!) 195/103 -   04/24/17 1010 - - - - 96 %   04/24/17 1005 98.5 °F (36.9 °C) 60 16 (!) 201/118 96 %   04/24/17 0932 97.8 °F (36.6 °C) 69 18 (!) 215/125 99 %        Medications ordered:   Medications   0.9% sodium chloride infusion (150 mL/hr IntraVENous New Bag 4/24/17 1116)   lisinopril (PRINIVIL, ZESTRIL) tablet 10 mg (not administered)   hydroCHLOROthiazide (HYDRODIURIL) tablet 25 mg (not administered)   felodipine (PLENDIL SR) 24 hr tablet 10 mg (not administered)   potassium chloride (K-DUR, KLOR-CON) SR tablet 20 mEq (not administered)   aspirin delayed-release tablet 81 mg (not administered)   metoprolol tartrate (LOPRESSOR) tablet 25 mg (not administered)   aspirin chewable tablet 324 mg (324 mg Oral Given 4/24/17 1108)   nitroglycerin (NITROBID) 2 % ointment 1 Inch (1 Inch Topical Given 4/24/17 1109)         Lab findings:  Recent Results (from the past 12 hour(s))   CARDIAC PANEL,(CK, CKMB & TROPONIN)    Collection Time: 04/24/17 10:15 AM   Result Value Ref Range    CK 91 39 - 308 U/L    CK - MB 1.7 <3.6 ng/ml    CK-MB Index 1.9 0.0 - 4.0 %    Troponin-I, Qt. 0.08 (H) 0.0 - 0.045 NG/ML   CBC W/O DIFF    Collection Time: 04/24/17 10:15 AM   Result Value Ref Range    WBC 4.9 4.6 - 13.2 K/uL    RBC 4.17 (L) 4.70 - 5.50 M/uL    HGB 13.9 13.0 - 16.0 g/dL    HCT 41.4 36.0 - 48.0 %    MCV 99.3 (H) 74.0 - 97.0 FL    MCH 33.3 24.0 - 34.0 PG    MCHC 33.6 31.0 - 37.0 g/dL    RDW 13.8 11.6 - 14.5 %    PLATELET 374 437 - 260 K/uL    MPV 12.6 (H) 9.2 - 85.1 FL   METABOLIC PANEL, COMPREHENSIVE    Collection Time: 04/24/17 10:15 AM   Result Value Ref Range    Sodium 143 136 - 145 mmol/L    Potassium 3.4 (L) 3.5 - 5.5 mmol/L    Chloride 107 100 - 108 mmol/L    CO2 29 21 - 32 mmol/L    Anion gap 7 3.0 - 18 mmol/L    Glucose 101 (H) 74 - 99 mg/dL    BUN 16 7.0 - 18 MG/DL    Creatinine 1.54 (H) 0.6 - 1.3 MG/DL    BUN/Creatinine ratio 10 (L) 12 - 20      GFR est AA 53 (L) >60 ml/min/1.73m2    GFR est non-AA 44 (L) >60 ml/min/1.73m2    Calcium 8.9 8.5 - 10.1 MG/DL    Bilirubin, total 0.7 0.2 - 1.0 MG/DL    ALT (SGPT) 25 16 - 61 U/L    AST (SGOT) 24 15 - 37 U/L    Alk.  phosphatase 207 (H) 45 - 117 U/L    Protein, total 8.4 (H) 6.4 - 8.2 g/dL    Albumin 3.4 3.4 - 5.0 g/dL    Globulin 5.0 (H) 2.0 - 4.0 g/dL    A-G Ratio 0.7 (L) 0.8 - 1.7     PRO-BNP    Collection Time: 04/24/17 10:15 AM   Result Value Ref Range    NT pro-BNP 13996 (H) 0 - 1800 PG/ML   LIPID PANEL    Collection Time: 04/24/17 10:15 AM   Result Value Ref Range    LIPID PROFILE          Cholesterol, total 214 (H) <200 MG/DL    Triglyceride 188 (H) <150 MG/DL    HDL Cholesterol 43 40 - 60 MG/DL LDL, calculated 133.4 (H) 0 - 100 MG/DL    VLDL, calculated 37.6 MG/DL    CHOL/HDL Ratio 5.0 0 - 5.0     HEMOGLOBIN A1C WITH EAG    Collection Time: 04/24/17 10:15 AM   Result Value Ref Range    Hemoglobin A1c 6.1 (H) 4.2 - 5.6 %    Est. average glucose 128 mg/dL   EKG, 12 LEAD, INITIAL    Collection Time: 04/24/17 10:56 AM   Result Value Ref Range    Ventricular Rate 61 BPM    Atrial Rate 61 BPM    P-R Interval 142 ms    QRS Duration 82 ms    Q-T Interval 462 ms    QTC Calculation (Bezet) 465 ms    Calculated P Axis 81 degrees    Calculated R Axis -3 degrees    Calculated T Axis -2 degrees    Diagnosis       Normal sinus rhythm with sinus arrhythmia  Possible Left atrial enlargement  Left ventricular hypertrophy  Nonspecific T wave abnormality  Prolonged QT  Abnormal ECG  When compared with ECG of 24-AUG-2012 11:25,  premature atrial complexes are no longer present  ST no longer elevated in Lateral leads  Nonspecific T wave abnormality now evident in Lateral leads       EKG per Dr. Martin Cambridge :  1101  61  NSR. QTc 465. LVH      X-Ray, CT or other radiology findings or impressions:  XR CHEST PA LAT   Final Result      DUPLEX RENAL ART/SANTIAGO BILATERAL    (Results Pending)   chest xray with bilateral airspace disease, small pleural effusion. Pt with chest pain and shortness of breath. Orders:  Orders Placed This Encounter    XR CHEST PA LAT     Standing Status:   Standing     Number of Occurrences:   1     Order Specific Question:   Transport     Answer:   Stretcher [5]     Order Specific Question:   Reason for Exam     Answer:   chest pain short of breath    DUPLEX RENAL ART/SANTIAGO BILATERAL     Standing Status:   Standing     Number of Occurrences:   1     Order Specific Question:   Transport     Answer:   Wheelchair [7]     Order Specific Question:   Where do you want this procedure performed?      Answer:   [facility]    CARDIAC PANEL,(CK, CKMB & TROPONIN)     Standing Status:   Standing     Number of Occurrences:   1    CARDIAC PANEL,(CK, CKMB & TROPONIN)     Standing Status:   Standing     Number of Occurrences:   1    CBC W/O DIFF     Standing Status:   Standing     Number of Occurrences:   1    METABOLIC PANEL, COMPREHENSIVE     Standing Status:   Standing     Number of Occurrences:   1    PRO-BNP     Standing Status:   Standing     Number of Occurrences:   1    LIPID PANEL     Standing Status:   Standing     Number of Occurrences:   1    HEMOGLOBIN A1C WITH EAG     Standing Status:   Standing     Number of Occurrences:   1    DIET NPO With Meds     And sips of water. Standing Status:   Standing     Number of Occurrences:   1     Order Specific Question:   NPO options: Answer: With Meds    Cardiac Monitor     Standing Status:   Standing     Number of Occurrences:   1     Order Specific Question:   Type: Answer:   Bedside    EKG, 12 LEAD, INITIAL     Standing Status:   Standing     Number of Occurrences:   1     Order Specific Question:   Reason for Exam:     Answer:   cp    EKG, 12 LEAD, INITIAL     Standing Status:   Standing     Number of Occurrences:   1     Order Specific Question:   Reason for Exam:     Answer:   CP    2D ECHO COMPLETE ADULT (TTE) W OR WO CONTR     Standing Status:   Standing     Number of Occurrences:   1     Order Specific Question:   Reason for Exam:     Answer:   Chest pain, hypertension. Order Specific Question:   Contrast Enhancement (Bubble Study, Definity, Optison) may be used if criteria listed in established evidence-based protocol has been identified.      Answer:   Yes     Order Specific Question:   Location of testing     Answer:   Transport to Dept    aspirin chewable tablet 324 mg    0.9% sodium chloride infusion    nitroglycerin (NITROBID) 2 % ointment 1 Inch    DISCONTD: metoprolol tartrate (LOPRESSOR) tablet 25 mg    lisinopril (PRINIVIL, ZESTRIL) tablet 10 mg    hydroCHLOROthiazide (HYDRODIURIL) tablet 25 mg    felodipine (PLENDIL SR) 24 hr tablet 10 mg    potassium chloride (K-DUR, KLOR-CON) SR tablet 20 mEq    aspirin delayed-release tablet 81 mg    metoprolol tartrate (LOPRESSOR) tablet 25 mg    INITIAL PHYSICIAN ORDER: OBSERVATION/OUTPATIENT IN A BED Telemetry     Standing Status:   Standing     Number of Occurrences:   1     Order Specific Question:   Patient Class: Answer:   Observation [104]     Order Specific Question:   Type of Bed     Answer:   Telemetry [19]     Order Specific Question:   Reason for Observation     Answer:   chest pain/short of breath/uncontrolled htn     Order Specific Question:   Admitting Physician     Answer:   Kara Brittle [[id number]]     Order Specific Question:   Attending Physician     Answer:   Tavon Carter     Order Specific Question:   Diagnosis     Answer:   chest pain/short of breath/uncontrolled htn       Reevaluation, Progress notes, Consult notes, or additional Procedure notes:   10:54 AM Consult:  Discussed care with Dr. Juma Fleming discussion; including history of patients chief complaint, available diagnostic results, and treatment course. Agrees to admit     10:54 AM Consult:  Discussed care with NURY Ribeiro,cardiology Standard discussion; including history of patients chief complaint, available diagnostic results, and treatment course. Agrees to evaluate    Disposition:Pt admitted    Diagnosis:   1. Chest pain, unspecified type    2. SOB (shortness of breath)    3. Nonintractable headache, unspecified chronicity pattern, unspecified headache type      Patient's Medications    No medications on file     SCRIBE ATTESTATION STATEMENT  Documented by: General Morley. Santiago Morrison for, and in the presence of, Kayley Mckinney MD 10:56 AM     Signed by: General Morley.  Isabell Johnson, 4/24/2017 10:56 AM     PROVIDER ATTESTATION STATEMENT  I personally performed the services described in the documentation, reviewed the documentation, as recorded by the scribe in my presence, and it accurately and completely records my words and actions.   Luis Case, MD

## 2017-04-24 NOTE — IP AVS SNAPSHOT
[address] Patient: Cadence Key MRN: [id number] :1935 You are allergic to the following No active allergies Recent Documentation Height Weight BMI Smoking Status 1.651 m 51.3 kg 18.8 kg/m2 Current Every Day Smoker Unresulted Labs Order Current Status CULTURE, BLOOD Preliminary result CULTURE, BLOOD Preliminary result Emergency Contacts Name Discharge Info Relation Home Work Mobile Tabitha Paz  Other Relative [6]   [phone] About your hospitalization You were admitted on:  2017 You last received care in the:  [address] [place] [address] You were discharged on:  2017 Unit phone number:  [phone] Why you were hospitalized Your primary diagnosis was:  Not on File Your diagnoses also included:  Chest Pain At Rest, Hypertension, Ckd (Chronic Kidney Disease) Stage 3, Gfr 30-59 Ml/Min, Chest Pain Providers Seen During Your Hospitalizations Provider Role Specialty Primary office phone Lyndsey Tolbert MD Attending Provider Emergency Medicine [phone] Ginger Phalen, MD Attending Provider [facility] [phone] Your Primary Care Physician (PCP) Primary Care Physician Office Phone Office Fax [facility] [phone] Follow-up Information Follow up With Details Comments Contact Info NURY Regan On 2017 w/ Ben ARRINGTON @4:00pm [address] [facility] [address] 
[phone] Ginger Phalen, MD On 2017 @ 3:30 pm [address] [place] [address] [facility] [id number] 
[phone] Your Appointments Monday May 01, 2017  4:00 PM EDT New Patient with NURY Regan ([facility]) [address] [facility] [address] [phone] Current Discharge Medication List  
  
START taking these medications Dose & Instructions Dispensing Information Comments Morning Noon Evening Bedtime  
 aspirin delayed-release 81 mg tablet Your last dose was: Your next dose is:    
   
   
 Dose:  81 mg Take 1 Tab by mouth daily. Quantity:  30 Tab Refills:  0  
     
   
   
   
  
 felodipine 10 mg 24 hr tablet Commonly known as:  PLENDIL SR Your last dose was: Your next dose is:    
   
   
 Dose:  10 mg Take 1 Tab by mouth daily. Quantity:  30 Tab Refills:  1  
     
   
   
   
  
 hydroCHLOROthiazide 25 mg tablet Commonly known as:  HYDRODIURIL Your last dose was: Your next dose is:    
   
   
 Dose:  25 mg Take 1 Tab by mouth daily. Quantity:  30 Tab Refills:  0  
     
   
   
   
  
 lisinopril 10 mg tablet Commonly known as:  Abhishek Fisher Your last dose was: Your next dose is:    
   
   
 Dose:  10 mg Take 1 Tab by mouth daily. Quantity:  30 Tab Refills:  0 Where to Get Your Medications Information on where to get these meds will be given to you by the nurse or doctor. ! Ask your nurse or doctor about these medications  
  aspirin delayed-release 81 mg tablet  
 felodipine 10 mg 24 hr tablet  
 hydroCHLOROthiazide 25 mg tablet  
 lisinopril 10 mg tablet Discharge Instructions Chest Pain: Care Instructions Your Care Instructions There are many things that can cause chest pain. Some are not serious and will get better on their own in a few days. But some kinds of chest pain need more testing and treatment. Your doctor may have recommended a follow-up visit in the next 8 to 12 hours. If you are not getting better, you may need more tests or treatment.  
Even though your doctor has released you, you still need to watch for any problems. The doctor carefully checked you, but sometimes problems can develop later. If you have new symptoms or if your symptoms do not get better, get medical care right away. If you have worse or different chest pain or pressure that lasts more than 5 minutes or you passed out (lost consciousness), call 911 or seek other emergency help right away. A medical visit is only one step in your treatment. Even if you feel better, you still need to do what your doctor recommends, such as going to all suggested follow-up appointments and taking medicines exactly as directed. This will help you recover and help prevent future problems. How can you care for yourself at home? · Rest until you feel better. · Take your medicine exactly as prescribed. Call your doctor if you think you are having a problem with your medicine. · Do not drive after taking a prescription pain medicine. When should you call for help? Call 911 if: 
· You passed out (lost consciousness). · You have severe difficulty breathing. · You have symptoms of a heart attack. These may include: ¨ Chest pain or pressure, or a strange feeling in your chest. 
¨ Sweating. ¨ Shortness of breath. ¨ Nausea or vomiting. ¨ Pain, pressure, or a strange feeling in your back, neck, jaw, or upper belly or in one or both shoulders or arms. ¨ Lightheadedness or sudden weakness. ¨ A fast or irregular heartbeat. After you call 911, the  may tell you to chew 1 adult-strength or 2 to 4 low-dose aspirin. Wait for an ambulance. Do not try to drive yourself. Call your doctor today if: 
· You have any trouble breathing. · Your chest pain gets worse. · You are dizzy or lightheaded, or you feel like you may faint. · You are not getting better as expected. · You are having new or different chest pain. Where can you learn more? Go to http://tung-madeline.info/.  
Enter A120 in the search box to learn more about \"Chest Pain: Care Instructions. \" Current as of: May 27, 2016 Content Version: 11.2 © 9431-0384 [employer]. Care instructions adapted under license by [employer] (which disclaims liability or warranty for this information). If you have questions about a medical condition or this instruction, always ask your healthcare professional. [address] any warranty or liability for your use of this information. Discharge Orders None [employer] [facility] [employer] Announcement We are excited to announce that we are making your provider's discharge notes available to you in [employer]. You will see these notes when they are completed and signed by the physician that discharged you from your recent hospital stay. If you have any questions or concerns about any information you see in [employer], please call the Health Information Department where you were seen or reach out to your Primary Care Provider for more information about your plan of care. Introducing [facility]! Bayron Davenport introduces [employer] patient portal. Now you can access parts of your medical record, email your doctor's office, and request medication refills online. 1. In your internet browser, go to [email or web address] 2. Click on the First Time User? Click Here link in the Sign In box. You will see the New Member Sign Up page. 3. Enter your [employer] Access Code exactly as it appears below. You will not need to use this code after youve completed the sign-up process. If you do not sign up before the expiration date, you must request a new code. · [employer] Access Code: [email or web address] Expires: 7/23/2017 12:18 PM 
 
4. Enter the last four digits of your Social Security Number (xxxx) and Date of Birth (mm/dd/yyyy) as indicated and click Submit. You will be taken to the next sign-up page. 5. Create a [employer] ID.  This will be your [employer] login ID and cannot be changed, so think of one that is secure and easy to remember. 6. Create a [employer] password. You can change your password at any time. 7. Enter your Password Reset Question and Answer. This can be used at a later time if you forget your password. 8. Enter your e-mail address. You will receive e-mail notification when new information is available in [address] 9. Click Sign Up. You can now view and download portions of your medical record. 10. Click the Download Summary menu link to download a portable copy of your medical information. If you have questions, please visit the Frequently Asked Questions section of the [employer] website. Remember, [employer] is NOT to be used for urgent needs. For medical emergencies, dial 911. Now available from your iPhone and Android! General Information Please provide this summary of care documentation to your next provider. Patient Signature:  ____________________________________________________________ Date:  ____________________________________________________________  
  
Yfn Greyson Provider Signature:  ____________________________________________________________ Date:  ____________________________________________________________

## 2017-04-24 NOTE — ED NOTES
Pt medicated per [facility] instructions for 1/10 intermittent midsternal chest pain that radiates through back and generalized headache  Pt comfortable level of pain is 0/10  NS running at 150ml/hr  VS updated  Whiteboard updated  Pt in gown and belongings placed in pt bag with pt name on bag  Bag placed on back of stretcher, pt aware  Pt updated on plan of care  Lights dimmed for pt comfort  Call bell within reach  Will continue to monitor

## 2017-04-24 NOTE — ED NOTES
Pt medicated per [facility] instructions  VS updated  Pt admitted to hospital  Report given to SUSHILA Chamorro  IV 20 gauge, left [facility] patent/intact  VS updated  Pt transported by ED staff  Pt in gown, clean and dry  Pt's belongings in white bag with pt label on bag  Bag placed on back of stretcher. Pt aware.   Pt condition stable

## 2017-04-24 NOTE — ED NOTES
IV access established   20 gauge, left AC  Labs drawn and sent  Whiteboard updated  Bed in lowest position  Call bell within reach  Pt updated on plan of care  Will continue to monitor

## 2017-04-24 NOTE — ED NOTES
Pt agrees to remain in ED and continue fluids for now  He remains hooked to monitor  VS updated  Lights dimmed for comfort  Bed locked and in lowest position  Call bell and urinal within reach  Will continue to monitor

## 2017-04-24 NOTE — Clinical Note
Patient Class[de-identified] Observation [214] Type of Bed: Telemetry [19] Reason for Observation: chest pain/short of breath/uncontrolled htn Admitting Physician: Starla Merino Attending Physician: Starla Merino Diagnosis: chest pain/short of breath/uncontrolled htn

## 2017-04-24 NOTE — IP AVS SNAPSHOT
Current Discharge Medication List  
  
START taking these medications Dose & Instructions Dispensing Information Comments Morning Noon Evening Bedtime  
 aspirin delayed-release 81 mg tablet Your last dose was: Your next dose is:    
   
   
 Dose:  81 mg Take 1 Tab by mouth daily. Quantity:  30 Tab Refills:  0  
     
   
   
   
  
 felodipine 10 mg 24 hr tablet Commonly known as:  PLENDIL SR Your last dose was: Your next dose is:    
   
   
 Dose:  10 mg Take 1 Tab by mouth daily. Quantity:  30 Tab Refills:  1  
     
   
   
   
  
 hydroCHLOROthiazide 25 mg tablet Commonly known as:  HYDRODIURIL Your last dose was: Your next dose is:    
   
   
 Dose:  25 mg Take 1 Tab by mouth daily. Quantity:  30 Tab Refills:  0  
     
   
   
   
  
 lisinopril 10 mg tablet Commonly known as:  Tanda Limerick Your last dose was: Your next dose is:    
   
   
 Dose:  10 mg Take 1 Tab by mouth daily. Quantity:  30 Tab Refills:  0 Where to Get Your Medications Information on where to get these meds will be given to you by the nurse or doctor. ! Ask your nurse or doctor about these medications  
  aspirin delayed-release 81 mg tablet  
 felodipine 10 mg 24 hr tablet  
 hydroCHLOROthiazide 25 mg tablet  
 lisinopril 10 mg tablet

## 2017-04-24 NOTE — H&P
History and Physical    Patient: Matty Krishna MRN: [id number]  SSN: [id number]    YOB: 1935  Age: 80 y.o. Sex: male      Subjective:      Matty Krishna is a 80 y.o. male who ran out of his medications about 4 days ago and has been having episodes of \"hard pressure\" chest pain mid strnum off and on . Patuient awakened this am due to pain and presented to the ER. Patient with elevated blood pressure and borderline troponin. Admit to observation for chest pain. Past Medical History:   Diagnosis Date    Diabetes ([facility] [address]     Hypertension      Past Surgical History:   Procedure Laterality Date    NEUROLOGICAL PROCEDURE UNLISTED        No family history on file. Social History   Substance Use Topics    Smoking status: Current Every Day Smoker     Packs/day: 0.25    Smokeless tobacco: Not on file    Alcohol use No      Prior to Admission medications    Not on File        No Known Allergies    Review of Systems:  Review of systems not obtained due to patient factors. Objective:     Vitals:    04/24/17 0932 04/24/17 1005 04/24/17 1010 04/24/17 1108   BP: (!) 215/125 (!) 201/118  (!) 195/103   Pulse: 69 60     Resp: 18 16     Temp: 97.8 °F (36.6 °C) 98.5 °F (36.9 °C)     SpO2: 99% 96% 96%    Weight: 68 kg (150 lb)      Height: 5' 5\" (1.651 m)           Physical Exam:  GENERAL: alert, cooperative, mild distress, appears older than stated age  THROAT & NECK: normal and no erythema or exudates noted. LUNG: clear to auscultation bilaterally  HEART: regular rate and rhythm, S1, S2 normal, no murmur, click, rub or gallop  ABDOMEN: soft, non-tender.  Bowel sounds normal. No masses,  no organomegaly  EXTREMITIES:  extremities normal, atraumatic, no cyanosis or edema    Assessment:     Hospital Problems  Date Reviewed: 4/24/2017          Codes Class Noted POA    Chest pain at rest ICD-10-CM: R07.9  ICD-9-CM: 786.50  4/24/2017 Unknown        Hypertension ICD-10-CM: I10  ICD-9-CM: 401.9  4/24/2017 Unknown        CKD (chronic kidney disease) stage 3, GFR 30-59 ml/min ICD-10-CM: N18.3  ICD-9-CM: 585.3  4/24/2017 Unknown              Plan:     [facility]  Cardiology consulted serial troponin   Echo renal duplex study.      Signed By: Meghan Mckeon MD     April 24, 2017

## 2017-04-24 NOTE — PROGRESS NOTES
LEONIDAS spoke with the patient and family member at bedside. Per ED Provider, the patient has not been taking his medications due to cost.  The patient is currently insured with Medicare and reportedly has Part D Plan through [address] [place] [address]. The patient stated he was being charged \"hundreds\" for his medications at his [address] on [place] and [facility]. Patient stated he used to only pay $1-$2 for prescriptions until he was \"dropped\" from his previous program.  Patient thinks he had Medicaid. Patient currently not enrolled in [facility]. Per family member at bedside, she will take the patient to apply for Medicaid down at . Patient also informed that he will most likely benefit from taking his prescriptions to a \"preferred\" pharmacy aka [facility] with his coverage. Additionally, LEONIDAS informed the patient that all but 2 of his current medications are on the $4 list at [facility].   One month of the 2 other medications will cost about $60/mo at [facility].      1615: LEONIDAS input the patient's prescriptions on the [employer] website \"prescription cost tool\" and printed results for the patient showing preferred pharmacy and anticipated annual cost.

## 2017-04-24 NOTE — ED NOTES
Pt reports generalized headache, intermittent, sharp mid-upper back pain and midsternal chest pain rated 1/10  Pt states he is comfortable now due to taking advil prior to arrival to ED  He also reports cough and fever, stating symptoms started 3-4 days ago  Pt reports when he takes BP meds he is fine but has difficulty paying for his meds and recently ran out   VS updated

## 2017-04-24 NOTE — CONSULTS
[facility] - Consult Note    Consultation request by Evangelina Elliott MD for advice/opinion related to evaluating chest pain/short of breath/uncontrolled htn    Date of  Admission: 4/24/2017 10:00 AM   Primary Care Physician:  Barrington Mckinney MD     Assessment:     -Atypical Chest Pain / Hypertensive Crisis. Low suspicion for primary ACS  - Elevated BNP 10,423; clinically does not appear to be in acute heart failure  - Mildly elevated Trop 0.08  - EKG 4/24/17 showed NSR, significant LVH, non-specific T wave changes, prolonged QT  - No known history of ischemic evaluation  - Patient states multiple admissions for MI at [facility] with no correlating records    -Essential hypertension; uncontrolled. Stopped taking home BP meds. - Per PCP, patient taking: metropolol 50 BID, Lisinopril 10mg daily, HCTZ 25mg daily, Felodipine 10mg daily    -Diabetes Mellitus  - Medication unknown; \"big white pill\"    -Chronic Renal Failure, Stage 3  - Baseline elevated Cr; stable    -Hypokalemia 3.4, replace    -Tobacco Abuse, cessation discussed    -Medication Non-compliance  - Patient not able to afford medications;  contacted for assistance  - Poor health literacy     Plan:     Low suspicion for primary ACS. Restart home BP meds and monitor. Echo with EF 45%, will obtain pharm nuc tomorrow for completeness given risk factors. Encourage compliance and find ways for patient to stay on/afford medications. History of Present Illness: This is a 80 y.o. male admitted for chest pain/short of breath/uncontrolled htn. Patient states 4 days of cough, subjective fever, vomiting, intermittent chest pain. Chest pain described as sharp substernal pain radiation to back primarily occurring during cough. The cough is described as non-productive. Emesis is described as non-bloody \"spit like\" and occurred approximately 4 times without accompanying nausea.  He states symptoms began after he ran out of his blood pressure medication. He was told by [facility] pharmacy that he was going to die, which prompted him to come in. He denies any prior history of cardiovascular pathology, but when questioned states multiple admissions to [facility] for myocardial infarctions. These claims are not substantiated by medical records at either [facility] or [facility]. Currently denies chest pain, shortness of breath, dysuria, lower extremity edema. Cardiac risk factors: smoking/ tobacco exposure, diabetes mellitus, male gender, hypertension      Review of Symptoms:  Except as stated above include:  Constitutional:  POSITIVE subjective fevers, vomiting, negative nausea  Respiratory:  POSITIVE cough, shortness of breath  Cardiovascular:  POSITIVE chest pain, negative palpitations  Gastrointestinal: Negative  Genitourinary:  Negative dysuria  Musculoskeletal:  Positive thoracic pain  Neurological:  Negative  Dermatological:  Negative  Endocrinological: Negative  Psychological:  Negative    Pertinent items are noted in HPI. Past Medical History:     Past Medical History:   Diagnosis Date    Diabetes ([facility] [address]     Hypertension          Social History:     Social History     Social History    Marital status: SINGLE     Spouse name: N/A    Number of children: N/A    Years of education: N/A     Social History Main Topics    Smoking status: Current Every Day Smoker     Packs/day: 0.25    Smokeless tobacco: Not on file    Alcohol use No    Drug use: Not on file    Sexual activity: Not on file     Other Topics Concern    Not on file     Social History Narrative    No narrative on file        Family History:   No family history on file.      Medications:   No Known Allergies     Current Facility-Administered Medications   Medication Dose Route Frequency    aspirin chewable tablet 324 mg  324 mg Oral NOW    0.9% sodium chloride infusion  150 mL/hr IntraVENous CONTINUOUS    nitroglycerin (NITROBID) 2 % ointment 1 Inch  1 Inch Topical ONCE     No current outpatient prescriptions on file. Physical Exam:     Visit Vitals    BP (!) 201/118 (BP 1 Location: Left arm, BP Patient Position: Sitting)    Pulse 60    Temp 98.5 °F (36.9 °C)    Resp 16    Ht 5' 5\" (1.651 m)    Wt 68 kg (150 lb)    SpO2 96%    BMI 24.96 kg/m2     BP Readings from Last 3 Encounters:   04/24/17 (!) 201/118   08/24/12 (!) 188/103     Pulse Readings from Last 3 Encounters:   04/24/17 60   08/24/12 (!) 53     Wt Readings from Last 3 Encounters:   04/24/17 68 kg (150 lb)   08/24/12 63.5 kg (140 lb)       General:  alert, cooperative, no distress, appears stated age, thin habitus  Neck:  nontender, no masses, no stridor, no carotid bruit  Lungs:  clear to auscultation bilaterally, no wheeze / rhonchi / rales  Heart:  regular rate and rhythm, S1, S2 normal, no murmur, click, rub or gallop  Abdomen:  Soft, non-distended, positive voluntary guarding and tenderness right periumbilical area, no masses, no organomegaly  Extremities:  extremities normal, atraumatic, no cyanosis or edema  Skin: Warm and dry.  no hyperpigmentation, vitiligo, or suspicious lesions  Neuro: alert, oriented x3, affect appropriate, no focal neurological deficits, moves all extremities well, no involuntary movements  Psych: non focal     Data Review:     Recent Labs      04/24/17   1015   WBC  4.9   HGB  13.9   HCT  41.4   PLT  165     Recent Labs      04/24/17   1015   NA  143   K  3.4*   CL  107   CO2  29   GLU  101*   BUN  16   CREA  1.54*   CA  8.9   ALB  3.4   SGOT  24   ALT  25       Results for orders placed or performed during the hospital encounter of 04/24/17   EKG, 12 LEAD, INITIAL   Result Value Ref Range    Ventricular Rate 61 BPM    Atrial Rate 61 BPM    P-R Interval 142 ms    QRS Duration 82 ms    Q-T Interval 462 ms    QTC Calculation (Bezet) 465 ms    Calculated P Axis 81 degrees    Calculated R Axis -3 degrees    Calculated T Axis -2 degrees    Diagnosis       Normal sinus rhythm with sinus arrhythmia  Possible Left atrial enlargement  Left ventricular hypertrophy  Nonspecific T wave abnormality  Prolonged QT  Abnormal ECG  When compared with ECG of 24-AUG-2012 11:25,  premature atrial complexes are no longer present  ST no longer elevated in Lateral leads  Nonspecific T wave abnormality now evident in Lateral leads         All Cardiac Markers in the last 24 hours:    Lab Results   Component Value Date/Time    CPK 91 04/24/2017 10:15 AM    CKMB 1.7 04/24/2017 10:15 AM    CKND1 1.9 04/24/2017 10:15 AM    TROIQ 0.08 (H) 04/24/2017 10:15 AM       Last Lipid:  No results found for: CHOL, CHOLX, CHLST, CHOLV, HDL, LDL, DLDL, LDLC, DLDLP, TGL, TGLX, TRIGL, TRIGP, CHHD, CHHDX    Signed By: Sindi GRANT    April 24, 2017

## 2017-04-24 NOTE — ED NOTES
Rounded on pt  Pt upset, requesting to be given his meds and be discharged  Discussed pt's concerns and he reports he doesn't want to stay  Recycled VS. BP remains elevated.   Pt is aware of BP and states he will \"just have to drop dead\"  MD at bedside discussing plan of care with pt

## 2017-04-25 NOTE — ROUTINE PROCESS
46 Dr Chica Padilla notified of patient's complaint and increase agitation. Patient was awaken for VS, increase agitation and verbalize was having mid chest and back pain in ED. Patient states nobody is doing anything for his pain. Reassured he's getting medication and reminded what was given. 02 applied and HOB up.      0300 Patient quietly resting.

## 2017-04-25 NOTE — PROCEDURES
[address] STRESS    Name:  Isaias Garvey  MR#:  [id number]  :  1935  Account #:  [de-identified]  Date of Adm:  2017  Date of Service:  2017      PROCEDURES PERFORMED:  Pharmacological nuclear stress test  with gated SPECT imaging. ORDERING PHYSICIAN:  Glendy Pacheco M.D. PRIMARY CARE PHYSICIAN:  Gucci Chawla M.D. INDICATIONS:  Chest pain. FINDINGS:  The resting heart was 50. Blood pressure was 219/115. Baseline EKG showed sinus bradycardia and voltage criteria for LVH,  with diffuse ST-T abnormality, likely related to repolarization changes,  but cannot exclude ischemia. PHARMACOLOGIC STRESS PORTION:  The patient underwent a  Lexiscan infusion with 0.4 mg intravenously per protocol via the left AC  IV and then swung his legs. The patient remained in sinus rhythm with  baseline ST-T changes, making this a non-diagnostic portion of the  test.    PERFUSION IMAGING:  The patient received intravenous technetium-  99m sestamibi 11.0 mCi intravenously per protocol via the [facility] IV for  resting images and then 11.6 millicuries via the same IV for stress  images. Tomographic views of the left ventricle were obtained and  compared. The cavity size was normal with both rest and stress  imaging. There was no transient ischemic dilatation present. There  was fairly uniform radiotracer uptake throughout the left ventricular  myocardium during both stress and rest images, without significant  perfusion abnormalities concerning for ischemia or infarction. Gated  analysis showed normal left ventricular size and moderately depressed  LV systolic function, with an ejection fraction calculated at 31%. However, this was felt to be an underestimation due to poor delineation  of the myocardial borders. There was moderate global hypokinesis. CONCLUSIONS  1.  Abnormal and intermediate risk pharmacologic nuclear stress based  on the depressed ejection fraction. 2. No perfusion imaging abnormalities concerning for myocardial  ischemia or previous myocardial infarction. 3. Normal left ventricular size and moderately depressed left  ventricular systolic function, with global hypokinesis.         Helena Toney MD    MR / [facility]  D:  04/25/2017   15:08  T:  04/25/2017   15:47  Job #:  [id number]

## 2017-04-25 NOTE — ROUTINE PROCESS
Bedside and Verbal shift change report given to Michael Clifford (oncoming nurse) by Kimmie Jang. Eugenia Knox (offgoing nurse). Report included the following information Kardex, Intake/Output and MAR.

## 2017-04-25 NOTE — PROGRESS NOTES
Patient injected with 83.52 millicuries of [id number]-[id number] for Nuclear resting images. Patient injected with 98.9 millicuries of [id number]-[id number] for Nuclear stress images. Injected with 0.4mg Lexiscan. Patient's armband was left on.

## 2017-04-25 NOTE — PROGRESS NOTES
Progress Note    Patient: Sussy Gonzalez MRN: [id number]  SSN: [id number]    YOB: 1935  Age: 80 y.o. Sex: male      Admit Date: 4/24/2017    LOS: 0 days     Subjective:     Patient with known hypertension admitted under observation due to chest pain. Patient has Acute renal insufficiency and blood pressure under poor control. Chest pain improved. Objective:     Vitals:    04/25/17 0513 04/25/17 1002 04/25/17 1133 04/25/17 1632   BP: (!) 199/108 (!) 230/108 (!) 184/104 (!) 186/97   Pulse: (!) 58 (!) 55 (!) 53 60   Resp: 20 20  20   Temp: 98.1 °F (36.7 °C) 98 °F (36.7 °C)  97.8 °F (36.6 °C)   SpO2: 96% 94%  97%   Weight: 49.5 kg (109 lb 1.6 oz)      Height:            Intake and Output:  Current Shift:    Last three shifts: 04/23 1901 - 04/25 0700  In: 0   Out: 500 [Urine:500]    Physical Exam:   GENERAL: alert, cooperative, no distress, appears older than stated age  LUNG: clear to auscultation bilaterally  HEART: regular rate and rhythm, S1, S2 normal, no murmur, click, rub or gallop  ABDOMEN: soft, non-tender. Bowel sounds normal. No masses,  no organomegaly  EXTREMITIES:  extremities normal, atraumatic, no cyanosis or edema    Lab/Data Review: All lab results for the last 24 hours reviewed. Creatinine 1.54  EF 35%  Blood cultures negative      Assessment:     Active Problems:    Chest pain at rest (4/24/2017)      Hypertension (4/24/2017)      CKD (chronic kidney disease) stage 3, GFR 30-59 ml/min (4/24/2017)        Plan: Will increase dose of clonidine. Awaiting decision by cardiology about possible cardiac catheterization.     Signed By: Gladis Crespo MD     April 25, 2017

## 2017-04-25 NOTE — PROGRESS NOTES
NUTRITION    Nutrition Screen      RECOMMENDATIONS / PLAN:     - Add supplements: Glucerna Shake TID.   - Continue RD inpatient monitoring and evaluation. NUTRITION INTERVENTIONS & DIAGNOSIS:     [x] Meals/Snacks: modified diet  [x] Medical food supplementation: initiate      Nutrition Diagnosis: Unintentional weight loss related to inadequate energy intake as evidenced by 18 lb, 14% weight loss x 2-3 months. Patient meets the AND/ASPEN criteria for Acute Severe Protein Calorie Malnutrition as evidenced by:   Nutritional intake of <50% of recommended intake for >5 days   Weight loss of >1-2% in 1 week, >5% in 1 month, >7.5% in 3 months, or >10% in 6 months     ASSESSMENT:     Subjective/Objective:  Appetite improved. Eating lunch and tolerating diet at time of visit. Discussed diabetic diet and importance of adequate protein/energy intake with patient and family. Agreeable to supplements. Average po intake adequate to meet patients estimated nutritional needs:   [x] Yes     [] No   [] Unable to determine at this time    Diet: DIET CARDIAC Regular      Food Allergies: NKFA  Current Appetite:   [x] Good     [] Fair     [] Poor     [] Other:  Appetite/meal intake prior to admission:   [] Good     [] Fair     [x] Poor     [x] Other: variable po intake, skipping meals or consuming only 1-2 meals per day for the past 2-3 weeks PTA   Feeding Limitations:  [] Swallowing difficulty    [] Chewing difficulty    [] Other:  Current Meal Intake: No data found.     BM: 4/24   Skin Integrity: WDL  Edema: none   Pertinent Medications: Reviewed    Recent Labs      04/24/17   1015   NA  143   K  3.4*   CL  107   CO2  29   GLU  101*   BUN  16   CREA  1.54*   CA  8.9   ALB  3.4   SGOT  24   ALT  25       Intake/Output Summary (Last 24 hours) at 04/25/17 1428  Last data filed at 04/25/17 0614   Gross per 24 hour   Intake                0 ml   Output              500 ml   Net             -500 ml       Anthropometrics:  Ht Readings from Last 1 Encounters:   04/24/17 5' 5\" (1.651 m)     Last 3 Recorded Weights in this Encounter    04/24/17 0932 04/25/17 0513   Weight: 68 kg (150 lb) 49.5 kg (109 lb 1.6 oz)     Body mass index is 18.16 kg/(m^2). Underweight      Weight History: previous weight was 165 lb 3-4 years ago and 127 lb several months ago per patient and family (18 lb, 14% weight loss x 2-3 months)     Weight Metrics 4/25/2017 8/24/2012   Weight 109 lb 1.6 oz 140 lb   BMI 18.16 kg/m2 23.3 kg/m2        Admitting Diagnosis: chest pain/short of breath/uncontrolled htn  Chest pain at rest  Past Medical History:   Diagnosis Date    Diabetes ([facility] [address]     Hypertension        Education Needs:        [] None identified  [] Identified - Not appropriate at this time  [x]  Identified and addressed - refer to education log  Learning Limitations:   [x] None identified  [] Identified    Cultural, Judaism & ethnic food preferences:  [x] None identified    [] Identified and addressed     ESTIMATED NUTRITION NEEDS:     Calories: 6480-6062 kcal (MSJx1.2-1.5) based on  [] Actual BW      [x] IBW 62 kg  CHO: 189-237 gm (50% kcal)   Protein: 50-62 gm (0.8-1 gm/kg) based on  [] Actual BW      [x] IBW   Fluid: 1 mL/kcal     MONITORING & EVALUATION:     Nutrition Goal(s):   1. Po intake of meals will meet >75% of patient estimated nutritional needs within the next 7 days.   Outcome:  [] Met/Ongoing    []  Not Met    [x] New/Initial Goal     Monitoring:   [x] Diet tolerance   [x] Meal intake   [x] Supplement intake   [] GI symptoms/ability to tolerate po diet   [] Respiratory status   [] Plan of care      Previous Recommendations (for follow-up assessments only):     []   Implemented       []   Not Implemented (RD to address)     [] No Recommendation Made     Discharge Planning: diabetic, cardiac diet   [x] Participated in care planning, discharge planning, & interdisciplinary rounds as appropriate      Chaim Henderson, [address] [place] [address]    Pager: [phone]

## 2017-04-25 NOTE — PROCEDURES
[facility]  [facility] ***    Name: Antoinette Karli  MRN: [id number]  : 17 Dec 1935  HIS Order #: [id number]  [id number] [facility] Visit #: [id number]  Date: 2017    TYPE OF TEST: Visceral Arterial Duplex    REASON FOR TEST    Aortic PSV:  50.7 cm/s  Diameter AP: 1.8 cm   TV: 1.8 cm                   Right          Left  Renal Artery:- -------------  -------------  Proximal  PSV:  34.3           28.2  Mid       PSV:  44.2           32.2  Distal    PSV:  56.2           52.9  Aortic ratio :   1.1            1.0    Medullary PSV:  16.1           29.1            EDV:   4.7            6.1            EDR:   0.3            0.2            SDR:   3.4            4.8    Cortical  PSV:  11.3           15.9            EDV:   3.1            6.1            EDR:   0.3            0.4            SDR:   3.6            2.6  Stenosis:      Normal         Normal  Kidney size:   10.0 cm         9.6 cm               x  3.9 cm      x  5.2 cm    Hilar:-        Right          Left  Acc. Time  AT:     secs           secs  Acc. Index AI:             RI:    INTERPRETATION/FINDINGS  RENAL:  1. No significant renal artery stenosis identified, both renal  arteries visualized. 2. The right kidney measures 10.0 cm.  3. The left kidney measures 9.6 cm.  4. Bilateral intrinsic/medical renal disease identified. 5. Bilateral renal veins are patent with phasic flow. ADDITIONAL COMMENTS    I have personally reviewed the data relevant to the interpretation of  this  study. TECHNOLOGIST: Elma Armijo RVT  Signed: 2017 01:41 PM    PHYSICIAN: Stanford Mcintosh MD  Signed: 2017 09:19 AM

## 2017-04-25 NOTE — ROUTINE PROCESS
Bedside and Verbal shift change report given to Isatu Lee (oncoming nurse) by Yoel Woodson RN (offgoing nurse). Report included the following information SBAR, Kardex, MAR and Recent Results.     SITUATION:    Code Status: Full Code   Reason for Admission: chest pain/short of breath/uncontrolled htn   Chest pain at rest    [facility] day: 0   Problem List:       Hospital Problems  Date Reviewed: 4/24/2017          Codes Class Noted POA    Chest pain at rest ICD-10-CM: R07.9  ICD-9-CM: 786.50  4/24/2017 Unknown        Hypertension ICD-10-CM: I10  ICD-9-CM: 401.9  4/24/2017 Unknown        CKD (chronic kidney disease) stage 3, GFR 30-59 ml/min ICD-10-CM: N18.3  ICD-9-CM: 585.3  4/24/2017 Unknown              BACKGROUND:    Past Medical History:   Past Medical History:   Diagnosis Date    [facility]     Hypertension          Patient taking anticoagulants no     ASSESSMENT:    Changes in Assessment Throughout Shift:  NONE     Patient has Central Line: no Reasons if yes: na   Patient has Christian Cath: no Reasons if yes: na      Last Vitals:     Vitals:    04/24/17 1925 04/24/17 2227 04/25/17 0029 04/25/17 0513   BP: (!) 209/107 (!) 208/106 (!) 214/124 (!) 199/108   Pulse: 63 (!) 59 68 (!) 58   Resp: 20  21 20   Temp: 98.3 °F (36.8 °C)  97.2 °F (36.2 °C) 98.1 °F (36.7 °C)   SpO2: 97%  94% 96%   Weight:    49.5 kg (109 lb 1.6 oz)   Height:            IV and DRAINS (will only show if present)   Peripheral IV 04/24/17 Left Antecubital-Site Assessment: Clean, dry, & intact     WOUND (if present)   Wound Type:  none   Dressing present Dressing Present : No   Wound Concerns/Notes:  none     PAIN    Pain Assessment    Pain Intensity 1: 0 (04/25/17 0513)    Pain Location 1: Chest, Head, Back    Pain Intervention(s) 1: Rest, Medication (see MAR)    Patient Stated Pain Goal: 0  o Interventions for Pain:  none, yes  o Intervention effective: yes  o Time of last intervention:  See MAR   o Reassessment Completed: yes  Last 3 Weights:  Last 3 Recorded Weights in this Encounter    04/24/17 0932 04/25/17 0513   Weight: 68 kg (150 lb) 49.5 kg (109 lb 1.6 oz)     Weight change:      INTAKE/OUPUT    Current Shift: 04/24 1901 - 04/25 0700  In: 0   Out: 500 [Urine:500]    Last three shifts:       LAB RESULTS     Recent Labs      04/24/17   1015   WBC  4.9   HGB  13.9   HCT  41.4   PLT  165        Recent Labs      04/24/17   1015   NA  143   K  3.4*   GLU  101*   BUN  16   CREA  1.54*   CA  8.9       RECOMMENDATIONS AND DISCHARGE PLANNING     1. Pending tests/procedures/ Plan of Care or Other Needs:  Stress Test     2. Discharge plan for patient and Needs/Barriers: none    3. Estimated Discharge Date: 4/28 Posted on Whiteboard in [facility] Room: yes      4. The patient's care plan was reviewed with the oncoming nurse. \"HEALS\" SAFETY CHECK      Fall Risk    Total Score: 1    Safety Measures: Safety Measures: Bed/Chair-Wheels locked, Bed in low position, Call light within reach, Fall prevention (comment), Side rails X 3    A safety check occurred in the patient's room between off going nurse and oncoming nurse listed above. The safety check included the below items  Area Items   H  High Alert Medications - Verify all high alert medication drips (heparin, PCA, etc.)   E  Equipment - Suction is set up for ALL patients (with yanker)  - Red plugs utilized for all equipment (IV pumps, etc.)  - WOWs wiped down at end of shift.  - Room stocked with oxygen, suction, and other unit-specific supplies   A  Alarms - Bed alarm is set for fall risk patients  - Ensure chair alarm is in place and activated if patient is up in a chair   L  Lines - Check IV for any infiltration  - Christian bag is empty if patient has a Christian   - Tubing and IV bags are labeled   S  Safety   - Room is clean, patient is clean, and equipment is clean. - Hallways are clear from equipment besides carts.    - Fall bracelet on for fall risk patients  - Ensure room is clear and free of clutter  - Suction is set up for ALL patients (with won)  - Hallways are clear from equipment besides carts.    - Isolation precautions followed, supplies available outside room, sign posted     Yunior Sauceda RN

## 2017-04-25 NOTE — PROGRESS NOTES
1925 Patient received from ED via stretcher, accompanied by transport and family member. Patient positioned in bed, oriented to unit and room. Call light in reach. MEW 3, VS recorded, B/P 209/107. Patient denies chest pain and no SOB noted. Patient assessed, quiet on rounds. 2000 Awaiting return call from Dr Santosh Cruz, paged earlier by [facility] RN,concerning RN. 2118 Dr Treasure Hughes return call, order given for BP medication.

## 2017-04-25 NOTE — PROGRESS NOTES
[facility]  -  Progress Note      Patient: Romana Coreas MRN: [id number]  SSN: [id number]    YOB: 1935  Age: 80 y.o. Sex: male      Admit Date: 4/24/2017    Hospital Problem List:     Hospital Problems  Date Reviewed: 4/24/2017          Codes Class Noted POA    Chest pain at rest ICD-10-CM: R07.9  ICD-9-CM: 786.50  4/24/2017 Unknown        Hypertension ICD-10-CM: I10  ICD-9-CM: 401.9  4/24/2017 Unknown        CKD (chronic kidney disease) stage 3, GFR 30-59 ml/min ICD-10-CM: N18.3  ICD-9-CM: 585.3  4/24/2017 Unknown            -Atypical Chest Pain / Hypertensive Crisis. Low suspicion for primary ACS  - Elevated BNP 10,423; clinically does not appear to be in acute heart failure  - Mildly elevated Trop 0.08>0.09  - EKG 4/24/17 showed NSR, significant LVH, non-specific T wave changes, prolonged QT  - No known history of ischemic evaluation  - Patient states multiple admissions for MI at [facility] with no correlating records     -Essential hypertension; uncontrolled. Stopped taking home BP meds. - Per PCP, patient taking: metropolol 50 BID, Lisinopril 10mg daily, HCTZ 25mg daily, Felodipine 10mg daily     -Diabetes Mellitus; HgbA1C 6.1  - Medication unknown; \"big white pill\"     -Chronic Renal Failure, Stage 3  - Baseline elevated Cr; stable    - Mixed Hyperlipidemia -Triglycerides 188 / Cholesterol 214     -Hypokalemia 3.4, replace     -Tobacco Abuse, cessation discussed     -Medication Non-compliance  - Patient not able to afford medications;  contacted for assistance  - Poor health literacy      Assessment & Plan:     Stress test reviewed, no focal ischemia. 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 No plans for cath. I will start hydralazine today, continue to uptitrate antihypertensives. No renal artery stenosis by U/S. Subjective:     No new complaints. Patient states mild headache which is his baseline.  Denies chest pain, shortness of breath, abdominal pain, nausea, vomiting, fever, chills, lower extremity pain/edema. Objective:      Patient Vitals for the past 8 hrs:   Temp Pulse Resp BP SpO2   04/25/17 0513 98.1 °F (36.7 °C) (!) 58 20 (!) 199/108 96 %         Patient Vitals for the past 96 hrs:   Weight   04/25/17 0513 49.5 kg (109 lb 1.6 oz)   04/24/17 0932 68 kg (150 lb)         Intake/Output Summary (Last 24 hours) at 04/25/17 1001  Last data filed at 04/25/17 5638   Gross per 24 hour   Intake                0 ml   Output              500 ml   Net             -500 ml       Physical Exam:  General:  alert, cooperative, no distress, appears stated age, resting comfortably in bed  HEENT:  Neck nontender, no stridor, oral/nasal mucosa dry  Lungs:  clear to auscultation bilaterally  Heart:  regular rate and rhythm, S1, S2 normal, no murmur, click, rub or gallop  Abdomen:  abdomen is soft without significant tenderness, masses, organomegaly or guarding  Extremities:  extremities normal, atraumatic, no cyanosis or edema    Data Review:     Labs: Results:       Chemistry Recent Labs      04/24/17   1015   GLU  101*   NA  143   K  3.4*   CL  107   CO2  29   BUN  16   CREA  1.54*   CA  8.9   AGAP  7   BUCR  10*   AP  207*   TP  8.4*   ALB  3.4   GLOB  5.0*   AGRAT  0.7*      CBC w/Diff Recent Labs      04/24/17   1015   WBC  4.9   RBC  4.17*   HGB  13.9   HCT  41.4   PLT  165      Cardiac Enzymes Lab Results   Component Value Date/Time    CPK 79 04/24/2017 06:11 PM    CPK 91 04/24/2017 10:15 AM    CKMB 1.9 04/24/2017 06:11 PM    CKMB 1.7 04/24/2017 10:15 AM    CKND1 2.4 04/24/2017 06:11 PM    CKND1 1.9 04/24/2017 10:15 AM    TROIQ 0.09 (H) 04/24/2017 06:11 PM    TROIQ 0.08 (H) 04/24/2017 10:15 AM      Coagulation No results for input(s): PTP, INR, APTT in the last 72 hours.     No lab exists for component: INREXT    Lipid Panel Lab Results   Component Value Date/Time    Cholesterol, total 214 04/24/2017 10:15 AM    HDL Cholesterol 43 04/24/2017 10:15 AM LDL, calculated 133.4 04/24/2017 10:15 AM    VLDL, calculated 37.6 04/24/2017 10:15 AM    Triglyceride 188 04/24/2017 10:15 AM    CHOL/HDL Ratio 5.0 04/24/2017 10:15 AM      BNP No results found for: BNP, BNPP, XBNPT   Liver Enzymes Recent Labs      04/24/17   1015   TP  8.4*   ALB  3.4   AP  207*   SGOT  24      Digoxin    Thyroid Studies No results found for: T4, T3U, TSH, TSHEXT         Signed By: Kacy GRANT    April 25, 2017      I saw, examined, and evaluated the patient. I personally reviewed the patient's labs, tests, vitals, orders, medications, updated history, and other providers assessments. I personally agree with the findings as stated and the plan as documented.

## 2017-04-25 NOTE — DIABETES MGMT
Diabetes Patient/Family Education Record    Factors That  May Influence Patients Ability  to Learn or  Comply With  Recommendations:    []   Language barrier    []   Cultural needs   []   Motivation    []   Cognitive limitation    []   Physical   []   Education    []   Physiological factors   []   Hearing/vision/speaking impairment   []   Zoroastrianism beliefs    []   Financial factors   []  Other:   [x]  No factors identified at this time.      Person Instructed:   [x]   Patient   [x]   Family   []  Other     Preference for Learning:   [x]   Verbal   [x]   Written   []  Demonstration     Level of Comprehension & Competence:    [x]  Good                                      [] Fair                                     []  Poor                             []  Needs Reinforcement   [x]  Teachback completed    Education Component:   []  Medication management, including how to administer insulin (if appropriate) and potential medication interactions    [x]  Nutritional management including the role of carbohydrate intake   []  Exercise   []  Signs, symptoms, and treatment of hyperglycemia and hypoglycemia   [] Treatment of hyperglycemia and hypoglycemia   []  Importance of blood glucose monitoring and how to obtain a blood glucose meter    []  Instruction on use of blood glucose meter   [x]  Discuss the importance of HbA1C monitoring and provide patient with  results   []  Sick day guidelines   []  Proper use and disposal of lancets, needles, syringes or insulin pens (if appropriate)   []  Potential long-term complications (retinopathy, kidney disease, neuropathy, heart disease, stroke, vascular disease, foot care)   [x] Provide emergency contact number and contact number for more information    [x]  Goal:  Patient/family will demonstrate understanding of Diabetes Self Management Skills by: 5/02/17  Plan for post-discharge education or self-management support:    [x] Outpatient class schedule provided            [] Patient Declined    [] Scheduled for outpatient classes (date) _______       Andrade Mckeon RD, CDE

## 2017-04-26 PROBLEM — R07.9 CHEST PAIN: Status: ACTIVE | Noted: 2017-01-01

## 2017-04-26 NOTE — PROGRESS NOTES
Care Management Interventions  PCP Verified by CM: Yes Ricardo Members)  Last Visit to PCP: 01/26/17  Palliative Care Consult (Criteria: CHF and RRAT>21): No (No order RRAT= 17)  Reason for No Palliative Care Consult: Other (see comment)  Mode of Transport at Discharge: BLS  Transition of Care Consult (CM Consult): Discharge Planning  Physical Therapy Consult: No  Occupational Therapy Consult: No  Speech Therapy Consult: No  Current Support Network: Lives Alone (2 story home ; sister Gia Saxena ( [phone])  & wilmer ( [phone]) are primary supports )  Confirm Follow Up Transport: Family (wilmer  can drive home)  Plan discussed with Pt/Family/Caregiver: Yes (with pt who was sitting up in bed )  Discharge Location  Discharge Placement: Home with outpatient services       * Note this assessment was completed on 04-25-17*    Reviewed current OBSERVATION level of care MOON letter and OBS letters reviewed, pt signed and given copy; original to chart       Discussed cost of medications at  [address] that prevented pt from filling some BP medications PTA- pt stated he had planned to use [facility] pharmacy as possibly lower cost  For RX.   CM also gave him a Good Rx discount coupon card to assist with co-pay fo medication

## 2017-04-26 NOTE — PROGRESS NOTES
conducted an initial consultation and Spiritual Assessment for Cadence Key, who is a 80 y.o.,male. Patients Primary Language is: [place]. According to the patients EMR Moravian Affiliation is: [facility].     The reason the Patient came to the hospital is:   Patient Active Problem List    Diagnosis Date Noted    Chest pain 04/26/2017    Chest pain at rest 04/24/2017    Hypertension 04/24/2017    CKD (chronic kidney disease) stage 3, GFR 30-59 ml/min 04/24/2017        The  provided the following Interventions:  Initiated a relationship of care and support. Explored issues of ana, belief, spirituality and Yazidi/ritual needs while hospitalized. Listened empathically. Provided chaplaincy education. Provided information about Spiritual Care Services. Offered prayer and assurance of continued prayers on patient's behalf. Chart reviewed. The following outcomes where achieved:  Patient shared limited information about both their medical narrative and spiritual journey/beliefs.  confirmed Patient's Moravian Affiliation. Patient processed feeling about current hospitalization. Patient expressed gratitude for 's visit. Assessment:  Patient does not have any Yazidi/cultural needs that will affect patients preferences in health care. There are no spiritual or Yazidi issues which require intervention at this time. Plan:  Chaplains will continue to follow and will provide pastoral care on an as needed/requested basis.  recommends bedside caregivers page  on duty if patient shows signs of acute spiritual or emotional distress. Chaplain Neela KWAN  [address] [facility]  [phone]

## 2017-04-26 NOTE — CDMP QUERY
Please clarify if this patient is being treated/managed for:    => Severe Protein Calorie Malnutrition     =>Other Explanation of clinical findings  =>Unable to Determine (no explanation of clinical findings)    The medical record reflects the following:    Risk:81 yom in with CP     Clinical Indicators:BMI 17 ,RD documented Glucerna Shake TID.   - Continue RD inpatient monitoring and evaluation.      NUTRITION INTERVENTIONS & DIAGNOSIS:      Meals/Snacks: modified diet   Medical food supplementation: initiate       Nutrition Diagnosis: Unintentional weight loss related to inadequate energy intake as evidenced by 18 lb, 14% weight loss x 2-3 months.      Patient meets the AND/ASPEN criteria for Acute Severe Protein Calorie Malnutrition as evidenced by:   Nutritional intake of <50% of recommended intake for >5 days   Weight loss of >1-2% in 1 week, >5% in 1 month, >7.5% in 3 months, or >10% in 6 months     Treatment: glucerna bid,RD consulted    Please clarify and document your clinical opinion in the progress notes and discharge summary including the definitive and/or presumptive diagnosis, (suspected or probable), related to the above clinical findings. Please include clinical findings supporting your diagnosis. If you DECLINE this query or would like to communicate with [facility], please utilize the \"[employer] message box\" at the TOP of the Progress Note on the right.       Thank you,

## 2017-04-26 NOTE — CDMP QUERY
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 male who ran out of his medications about 4 days ago and has been having episodes of \"hard pressure\" chest pain mid sternum off and on . Patuient awakened this am due to pain and presented to the ER. Patient with elevated blood pressure and borderline troponin. Dx HTN crises    Clinical Indicators:bnp 27171,hx NICMY ef 45% hx htn  and ckd    Treatment: coreg continued ,cardiology consulted    Please clarify and document your clinical opinion in the progress notes and discharge summary including the definitive and/or presumptive diagnosis, (suspected or probable), related to the above clinical findings. Please include clinical findings supporting your diagnosis. If you DECLINE this query or would like to communicate with [facility], please utilize the \""[employer] message box\" at the TOP of the Progress Note on the right.       Thank you,

## 2017-04-26 NOTE — ROUTINE PROCESS
Bedside and Verbal shift change report given to Abhay Torres (oncoming nurse) by Jena Strickland RN (offgoing nurse). Report included the following information SBAR, Kardex, MAR and Recent Results.     SITUATION:    Code Status: Full Code  Reason for Admission: chest pain/short of breath/uncontrolled htn  Chest pain at rest   Chest pain    [facility] day: 0   Problem List:       Hospital Problems  Date Reviewed: 4/24/2017          Codes Class Noted POA    Chest pain ICD-10-CM: R07.9  ICD-9-CM: 786.50  4/26/2017 Unknown        Chest pain at rest ICD-10-CM: R07.9  ICD-9-CM: 786.50  4/24/2017 Unknown        Hypertension ICD-10-CM: I10  ICD-9-CM: 401.9  4/24/2017 Unknown        CKD (chronic kidney disease) stage 3, GFR 30-59 ml/min ICD-10-CM: N18.3  ICD-9-CM: 585.3  4/24/2017 Unknown              BACKGROUND:    Past Medical History:   Past Medical History:   Diagnosis Date    Diabetes ([facility]     Hypertension          Patient taking anticoagulants no     ASSESSMENT:    Changes in Assessment Throughout Shift:  NONE     Patient has Central Line: no Reasons if yes: na   Patient has Christian Cath: no Reasons if yes: na      Last Vitals:     Vitals:    04/26/17 0400 04/26/17 0812 04/26/17 1139 04/26/17 1523   BP:  135/80 136/78 108/63   Pulse:  (!) 52 60 (!) 58   Resp:  18 18 16   Temp:  97.6 °F (36.4 °C) 97.5 °F (36.4 °C) 97.4 °F (36.3 °C)   SpO2:  97% 96% 95%   Weight: 48.7 kg (107 lb 6.4 oz)      Height:            IV and DRAINS (will only show if present)   Peripheral IV 04/24/17 Left Antecubital-Site Assessment: Clean, dry, & intact     WOUND (if present)   Wound Type:  none   Dressing present Dressing Present : No   Wound Concerns/Notes:  none     PAIN    Pain Assessment    Pain Intensity 1: 0 (04/26/17 1600)    Pain Location 1: Chest, Head, Back    Pain Intervention(s) 1: Rest, Medication (see MAR)    Patient Stated Pain Goal: 0  o Interventions for Pain:  none, yes  o Intervention effective: yes  o Time of last intervention:  See MAR   o Reassessment Completed: yes      Last 3 Weights:  Last 3 Recorded Weights in this Encounter    04/24/17 0932 04/25/17 0513 04/26/17 0400   Weight: 68 kg (150 lb) 49.5 kg (109 lb 1.6 oz) 48.7 kg (107 lb 6.4 oz)     Weight change: -19.323 kg (-42 lb 9.6 oz)     INTAKE/OUPUT    Current Shift:      Last three shifts: 04/25 0701 - 04/26 1900  In: 980 [P.O.:480; I.V.:500]  Out: 400 [Urine:400]     LAB RESULTS     Recent Labs      04/24/17   1015   WBC  4.9   HGB  13.9   HCT  41.4   PLT  165        Recent Labs      04/26/17   0416  04/24/17   1015   NA  139  143   K  2.8*  3.4*   GLU  105*  101*   BUN  20*  16   CREA  1.59*  1.54*   CA  8.6  8.9       RECOMMENDATIONS AND DISCHARGE PLANNING     1. Pending tests/procedures/ Plan of Care or Other Needs:      2. Discharge plan for patient and Needs/Barriers: none    3. Estimated Discharge Date: 4/28 Posted on Whiteboard in [address] [place] [address] Room: yes      4. The patient's care plan was reviewed with the oncoming nurse. \"HEALS\" SAFETY CHECK      Fall Risk    Total Score: 1    Safety Measures: Safety Measures: Bed/Chair-Wheels locked, Bed in low position, Call light within reach, Fall prevention (comment), Side rails X 3    A safety check occurred in the patient's room between off going nurse and oncoming nurse listed above.     The safety check included the below items  Area Items   H  High Alert Medications - Verify all high alert medication drips (heparin, PCA, etc.)   E  Equipment - Suction is set up for ALL patients (with yanker)  - Red plugs utilized for all equipment (IV pumps, etc.)  - WOWs wiped down at end of shift.  - Room stocked with oxygen, suction, and other unit-specific supplies   A  Alarms - Bed alarm is set for fall risk patients  - Ensure chair alarm is in place and activated if patient is up in a chair   L  Lines - Check IV for any infiltration  - Christian bag is empty if patient has a Christian   - Tubing and IV bags are labeled S  Safety   - Room is clean, patient is clean, and equipment is clean. - Hallways are clear from equipment besides carts. - Fall bracelet on for fall risk patients  - Ensure room is clear and free of clutter  - Suction is set up for ALL patients (with won)  - Hallways are clear from equipment besides carts.    - Isolation precautions followed, supplies available outside room, sign posted     Yaniv Wild RN

## 2017-04-26 NOTE — PROGRESS NOTES
[facility] - Progress Note  Admit Date: 4/24/2017    Assessment:     Hospital Problems  Date Reviewed: 4/24/2017          Codes Class Noted POA    Chest pain at rest ICD-10-CM: R07.9  ICD-9-CM: 786.50  4/24/2017 Unknown        Hypertension ICD-10-CM: I10  ICD-9-CM: 401.9  4/24/2017 Unknown        CKD (chronic kidney disease) stage 3, GFR 30-59 ml/min ICD-10-CM: N18.3  ICD-9-CM: 585.3  4/24/2017 Unknown            Hypertensive Heart Disease with Chronic Systolic CHF,CKD stage 3 and HTN    -Atypical chest pain, likely due to HTN. -Pharm nuc 4/25/17 without obvious ischemia  -Nonischemic cardiomyopathy with EF 45% by echo this admission, med Rx  -Poorly controled HTN  -CKD  - Diabetes  - Noncompliance  - Hypokalemia    Plan:     Need K replacement. BP improved, no chest pain. I discussed results of stress test, no ischemia, no further cardiac workup. I will switch lopressor to coreg. Encourage compliance, dispo planning. Please call if questions. Subjective:     No new complaints.      Objective:      Patient Vitals for the past 8 hrs:   Temp Pulse Resp BP SpO2   04/26/17 0812 97.6 °F (36.4 °C) (!) 52 18 135/80 97 %         Patient Vitals for the past 96 hrs:   Weight   04/26/17 0400 48.7 kg (107 lb 6.4 oz)   04/25/17 0513 49.5 kg (109 lb 1.6 oz)   04/24/17 0932 68 kg (150 lb)                    Intake/Output Summary (Last 24 hours) at 04/26/17 7437  Last data filed at 04/25/17 2029   Gross per 24 hour   Intake              250 ml   Output                0 ml   Net              250 ml       Physical Exam:  General:  alert, cooperative, no distress, appears stated age  Neck:  nontender  Lungs:  clear to auscultation bilaterally  Heart:  regular rate and rhythm, S1, S2 normal, no murmur, click, rub or gallop  Abdomen:  abdomen is soft without significant tenderness, masses, organomegaly or guarding  Extremities:  extremities normal, atraumatic, no cyanosis or edema    Data Review:     Labs: Results:       Chemistry Recent Labs      04/26/17   0416  04/24/17   1015   GLU  105*  101*   NA  139  143   K  2.8*  3.4*   CL  103  107   CO2  28  29   BUN  20*  16   CREA  1.59*  1.54*   CA  8.6  8.9   AGAP  8  7   BUCR  13  10*   AP   --   207*   TP   --   8.4*   ALB   --   3.4   GLOB   --   5.0*   AGRAT   --   0.7*      CBC w/Diff Recent Labs      04/24/17   1015   WBC  4.9   RBC  4.17*   HGB  13.9   HCT  41.4   PLT  165      Cardiac Enzymes No results found for: CPK, CKMMB, CKMB, RCK3, CKMBT, CKNDX, CKND1, SEGUNDO, TROPT, TROIQ, RILEY, TROPT, TNIPOC, BNP, BNPP   Coagulation No results for input(s): PTP, INR, APTT in the last 72 hours.     No lab exists for component: INREXT    Lipid Panel Lab Results   Component Value Date/Time    Cholesterol, total 214 04/24/2017 10:15 AM    HDL Cholesterol 43 04/24/2017 10:15 AM    LDL, calculated 133.4 04/24/2017 10:15 AM    VLDL, calculated 37.6 04/24/2017 10:15 AM    Triglyceride 188 04/24/2017 10:15 AM    CHOL/HDL Ratio 5.0 04/24/2017 10:15 AM      BNP No results found for: BNP, BNPP, XBNPT   Liver Enzymes Recent Labs      04/24/17   1015   TP  8.4*   ALB  3.4   AP  207*   SGOT  24      Digoxin    Thyroid Studies No results found for: T4, T3U, TSH, TSHEXT       Signed By: Maggy Gross MD     April 26, 2017

## 2017-04-26 NOTE — ROUTINE PROCESS
Potassium level of 2.8 called to Dr. Leo Chawla new orders received and entered for iv potassium x 3 runs of 10 mg each with lidocaine

## 2017-04-26 NOTE — PROGRESS NOTES
Progress Note    Patient: Matty Krishna MRN: [id number]  SSN: [id number]    YOB: 1935  Age: 80 y.o. Sex: male      Admit Date: 4/24/2017    LOS: 0 days     Subjective:     Patient with hypertension admitted initially for observation due to chest pain due to elevated blood pressure. Patient to haver cardiac catheterization today and noted to have potassium only 2.8 so now appropriate for inptient admission. Objective:     Vitals:    04/25/17 2009 04/26/17 0000 04/26/17 0400 04/26/17 0812   BP: 136/79 113/66  135/80   Pulse: 60 60  (!) 52   Resp: 12 18 18   Temp: 98.5 °F (36.9 °C) 98.3 °F (36.8 °C)  97.6 °F (36.4 °C)   SpO2: 93% 95%  97%   Weight:   48.7 kg (107 lb 6.4 oz)    Height:            Intake and Output:  Current Shift:    Last three shifts: 04/24 1901 - 04/26 0700  In: 250 [I.V.:250]  Out: 500 [Urine:500]    Physical Exam:   GENERAL: alert, cooperative, no distress, appears older than stated age  LUNG: clear to auscultation bilaterally  HEART: regular rate and rhythm, S1, S2 normal, no murmur, click, rub or gallop  ABDOMEN: soft, non-tender. Bowel sounds normal. No masses,  no organomegaly    Lab/Data Review: All lab results for the last 24 hours reviewed. Potassium 2.8  Creatinine 1.54    Assessment:     Active Problems:    Chest pain at rest (4/24/2017)      Hypertension (4/24/2017)      CKD (chronic kidney disease) stage 3, GFR 30-59 ml/min (4/24/2017)      Chest pain (4/26/2017)        Plan:     Potassium replacement. Cardiac catheterization today. Signed By: Irineo Kan MD     April 26, 2017        Patient has hypertensive heart disease with ckd stage 3 and hypertension and severe protein calorie malnutrition.

## 2017-04-26 NOTE — INTERDISCIPLINARY ROUNDS
IDR/SLIDR Summary          Patient: Lyndsay Sanchez MRN: [id number]    Age: 80 y.o. YOB: 1935 Room/Bed: 211/01   Admit Diagnosis: chest pain/short of breath/uncontrolled htn  Chest pain at rest  Chest pain  Principal Diagnosis: <principal problem not specified>   Goals:   Readmission: NO  Quality Measure: Not applicable  VTE Prophylaxis: Chemical  Influenza Vaccine screening completed? YES  Pneumococcal Vaccine screening completed? YES  Mobility needs: No   Nutrition plan:Yes  Consults:Case Management    Financial concerns:No  Escalated to CM? NO  RRAT Score:    Interventions:Home Health  Testing due for pt today?  NO  LOS: 0 days Expected length of stay 3 days  Discharge plan: home   PCP: Evelio Solorio MD  Transportation needs: No    Days before discharge:two or more days before discharge   Discharge disposition: Home    Signed:     Collin Panda RN  4/26/2017  8:00 PM

## 2017-04-26 NOTE — PROGRESS NOTES
[facility]  -  Progress Note      Patient: George Chaves MRN: [id number]  SSN: [id number]    YOB: 1935  Age: 80 y.o. Sex: male      Admit Date: 4/24/2017    Hospital Problem List:     Hospital Problems  Date Reviewed: 4/24/2017          Codes Class Noted POA    Chest pain at rest ICD-10-CM: R07.9  ICD-9-CM: 786.50  4/24/2017 Unknown        Hypertension ICD-10-CM: I10  ICD-9-CM: 401.9  4/24/2017 Unknown        CKD (chronic kidney disease) stage 3, GFR 30-59 ml/min ICD-10-CM: N18.3  ICD-9-CM: 585.3  4/24/2017 Unknown            -Atypical Chest Pain / Hypertensive Crisis.  Low suspicion for primary ACS  - Elevated BNP 10,423; clinically does not appear to be in acute heart failure  - Mildly elevated Trop 0.08>0.09  - EKG 4/24/17 showed NSR, significant LVH, non-specific T wave changes, prolonged QT  - No known history of ischemic evaluation  - Patient states multiple admissions for MI at [facility] with no correlating records     -Essential hypertension; uncontrolled.  Stopped taking home BP meds. Per PCP, patient taking: metropolol 50 BID, Lisinopril 10mg daily, HCTZ 25mg daily, Felodipine 10mg daily. U/S 4/26/17 showed no renal artery stenosis.    -Diabetes Mellitus; HgbA1C 6.1  - Medication unknown; \"big white pill\"     -Chronic Renal Failure, Stage 3  - Baseline elevated Cr; stable     - Mixed Hyperlipidemia -Triglycerides 188 / Cholesterol 214     -Hypokalemia 2.8, patient remains hypokalemic despite repletion.     -Tobacco Abuse, cessation discussed     -Medication Non-compliance  - Patient not able to afford medications;  contacted for assistance  - Poor health literacy    Assessment & Plan:     - BP controlled with increased clonidine and hydralazine  - Follow-up nuclear stress. +/- cardiac cath  - Continue OP antihypertensives at current dose  - K being repleated; will monitor elytes. Subjective:     No new complaints.  Denies CP, palpitations, SOB, cough, wheezing, abdominal pain, fever, chills, nausea, vomiting, headache. Objective:      Patient Vitals for the past 8 hrs:   Temp Pulse Resp BP SpO2   04/26/17 0812 97.6 °F (36.4 °C) (!) 52 18 135/80 97 %         Patient Vitals for the past 96 hrs:   Weight   04/26/17 0400 48.7 kg (107 lb 6.4 oz)   04/25/17 0513 49.5 kg (109 lb 1.6 oz)   04/24/17 0932 68 kg (150 lb)         Intake/Output Summary (Last 24 hours) at 04/26/17 0853  Last data filed at 04/25/17 2029   Gross per 24 hour   Intake              250 ml   Output                0 ml   Net              250 ml       Physical Exam:  General:  alert, cooperative, no distress, appears stated age  Neck:  nontender, no stridor, no carotid bruit, no JVD  Lungs:  clear to auscultation bilaterally, no wheeze, rhonchi, rales  Heart:  regular rate and rhythm, S1, S2 normal, no murmur, click, rub or gallop  Abdomen:  abdomen is firm without significant tenderness, masses, organomegaly or guarding  Extremities:  extremities normal, atraumatic, no cyanosis or edema    Data Review:     Labs: Results:       Chemistry Recent Labs      04/26/17   0416  04/24/17   1015   GLU  105*  101*   NA  139  143   K  2.8*  3.4*   CL  103  107   CO2  28  29   BUN  20*  16   CREA  1.59*  1.54*   CA  8.6  8.9   AGAP  8  7   BUCR  13  10*   AP   --   207*   TP   --   8.4*   ALB   --   3.4   GLOB   --   5.0*   AGRAT   --   0.7*      CBC w/Diff Recent Labs      04/24/17   1015   WBC  4.9   RBC  4.17*   HGB  13.9   HCT  41.4   PLT  165      Cardiac Enzymes No results found for: CPK, CKMMB, CKMB, RCK3, CKMBT, CKNDX, CKND1, SEGUNDO, TROPT, TROIQ, RILEY, TROPT, TNIPOC, BNP, BNPP   Coagulation No results for input(s): PTP, INR, APTT in the last 72 hours.     No lab exists for component: INREXT    Lipid Panel Lab Results   Component Value Date/Time    Cholesterol, total 214 04/24/2017 10:15 AM    HDL Cholesterol 43 04/24/2017 10:15 AM    LDL, calculated 133.4 04/24/2017 10:15 AM    VLDL, calculated 37.6 04/24/2017 10:15 AM    Triglyceride 188 04/24/2017 10:15 AM    CHOL/HDL Ratio 5.0 04/24/2017 10:15 AM      BNP No results found for: BNP, BNPP, XBNPT   Liver Enzymes Recent Labs      04/24/17   1015   TP  8.4*   ALB  3.4   AP  207*   SGOT  24      Digoxin    Thyroid Studies No results found for: T4, T3U, TSH, TSHEXT         Signed By: Mer GRANT    April 26, 2017

## 2017-04-27 NOTE — DISCHARGE SUMMARY
Discharge Summary     Patient: Jessica Stock MRN: [id number]  SSN: [id number]    YOB: 1935  Age: 80 y.o. Sex: male       Admit Date: 4/24/2017    Discharge Date: 4/27/2017      Admission Diagnoses: chest pain/short of breath/uncontrolled htn  Chest pain at rest  Chest pain    Discharge Diagnoses:   Problem List as of 4/27/2017  Date Reviewed: 4/24/2017          Codes Class Noted - Resolved    Chest pain ICD-10-CM: R07.9  ICD-9-CM: 786.50  4/26/2017 - Present        Chest pain at rest ICD-10-CM: R07.9  ICD-9-CM: 786.50  4/24/2017 - Present        Hypertension ICD-10-CM: I10  ICD-9-CM: 401.9  4/24/2017 - Present        CKD (chronic kidney disease) stage 3, GFR 30-59 ml/min ICD-10-CM: N18.3  ICD-9-CM: 585.3  4/24/2017 - Present               Discharge Condition: Stable    Hospital Course: Patient with hypertension and ckd 3 admitted due to chest pain at rest with very elevated blood pressure. Patient had not been taking his medications due to afordability. Patient admitede and evaluated for ACS. Blood presure brought down with resuming home medications and hydralazine. Patient underwentr ECHO and stress test.  Patient found to have decreased ejection fraction but no evidence ischemia. Patient also had low potasium that has been replaced. Patient will be going home on blood pressure medications with follow up in 1 week. Consults: Cardiology    Significant Diagnostic Studies: cardiac graphics: Echocardiogram:  and Stress Test:     Disposition: home    Discharge Medications:   Current Discharge Medication List      START taking these medications    Details   aspirin delayed-release 81 mg tablet Take 1 Tab by mouth daily. Qty: 30 Tab, Refills: 0      felodipine (PLENDIL SR) 10 mg 24 hr tablet Take 1 Tab by mouth daily. Qty: 30 Tab, Refills: 1      hydroCHLOROthiazide (HYDRODIURIL) 25 mg tablet Take 1 Tab by mouth daily.   Qty: 30 Tab, Refills: 0      lisinopril (PRINIVIL, ZESTRIL) 10 mg tablet Take 1 Tab by mouth daily. Qty: 30 Tab, Refills: 0             Activity: Activity as tolerated  Diet: Cardiac Diet  Wound Care: None needed    Follow-up Appointments   Procedures    FOLLOW UP VISIT Appointment in: One Week     Standing Status:   Standing     Number of Occurrences:   1     Order Specific Question:   Appointment in     Answer:    One Week       Signed By: Rebekah Diane MD     April 27, 2017

## 2017-04-27 NOTE — PROGRESS NOTES
NUTRITION    Nutrition Screen      RECOMMENDATIONS / PLAN:     - Continue current nutrition interventions. - Continue RD inpatient monitoring and evaluation. NUTRITION INTERVENTIONS & DIAGNOSIS:     [x] Meals/Snacks: modified diet  [x] Medical food supplementation: Glucerna Shake TID    Nutrition Diagnosis: Unintentional weight loss related to inadequate energy intake as evidenced by 18 lb, 14% weight loss x 2-3 months. Patient meets the AND/ASPEN criteria for Acute Severe Protein Calorie Malnutrition as evidenced by:   Nutritional intake of <50% of recommended intake for >5 days   Weight loss of >1-2% in 1 week, >5% in 1 month, >7.5% in 3 months, or >10% in 6 months     ASSESSMENT:     Subjective/Objective:  Appetite improved, consuming 100% of recent meals.     Average po intake adequate to meet patients estimated nutritional needs:   [x] Yes     [] No   [] Unable to determine at this time    Diet: DIET NUTRITIONAL SUPPLEMENTS All Meals; [address] [employer]  DIET CARDIAC Regular      Food Allergies: NKFA  Current Appetite:   [x] Good     [] Fair     [] Poor     [] Other:  Appetite/meal intake prior to admission:   [] Good     [] Fair     [x] Poor     [x] Other: variable po intake, skipping meals or consuming only 1-2 meals per day for the past 2-3 weeks PTA   Feeding Limitations:  [] Swallowing difficulty    [] Chewing difficulty    [] Other:  Current Meal Intake:   Patient Vitals for the past 100 hrs:   % Diet Eaten   04/26/17 1832 100 %   04/26/17 1525 100 %   04/26/17 0800 0 %     BM: 4/26  Skin Integrity: WDL  Edema: none   Pertinent Medications: Reviewed    Recent Labs      04/27/17   0225  04/26/17   0416   NA  140  139   K  3.1*  2.8*   CL  106  103   CO2  26  28   GLU  96  105*   BUN  28*  20*   CREA  1.66*  1.59*   CA  8.5  8.6       Intake/Output Summary (Last 24 hours) at 04/27/17 1336  Last data filed at 04/27/17 0629   Gross per 24 hour   Intake              967 ml   Output              450 ml Net              517 ml       Anthropometrics:  Ht Readings from Last 1 Encounters:   04/24/17 5' 5\" (1.651 m)     Last 3 Recorded Weights in this Encounter    04/25/17 0513 04/26/17 0400 04/27/17 0400   Weight: 49.5 kg (109 lb 1.6 oz) 48.7 kg (107 lb 6.4 oz) 51.3 kg (113 lb)     Body mass index is 18.8 kg/(m^2). Underweight      Weight History: previous weight was 165 lb 3-4 years ago and 127 lb several months ago per patient and family (18 lb, 14% weight loss x 2-3 months)     Weight Metrics 4/27/2017 8/24/2012   Weight 113 lb 140 lb   BMI 18.8 kg/m2 23.3 kg/m2        Admitting Diagnosis: chest pain/short of breath/uncontrolled htn  Chest pain at rest  Chest pain  Past Medical History:   Diagnosis Date    Diabetes ([facility] [address]     Hypertension        Education Needs:        [] None identified  [] Identified - Not appropriate at this time  [x]  Identified and addressed - refer to education log  Learning Limitations:   [x] None identified  [] Identified    Cultural, Pentecostal & ethnic food preferences:  [x] None identified    [] Identified and addressed     ESTIMATED NUTRITION NEEDS:     Calories: 7375-9766 kcal (MSJx1.2-1.5) based on  [] Actual BW      [x] IBW 62 kg  CHO: 189-237 gm (50% kcal)   Protein: 50-62 gm (0.8-1 gm/kg) based on  [] Actual BW      [x] IBW   Fluid: 1 mL/kcal     MONITORING & EVALUATION:     Nutrition Goal(s):   1. Po intake of meals will meet >75% of patient estimated nutritional needs within the next 7 days.   Outcome:  [x] Met/Ongoing    []  Not Met    [] New/Initial Goal     Monitoring:   [x] Diet tolerance   [x] Meal intake   [x] Supplement intake   [] GI symptoms/ability to tolerate po diet   [] Respiratory status   [] Plan of care      Previous Recommendations (for follow-up assessments only):     []   Implemented       []   Not Implemented (RD to address)     [] No Recommendation Made     Discharge Planning: diabetic, cardiac diet   [x] Participated in care planning, discharge planning, & interdisciplinary rounds as appropriate      Shelia Latham, [address] [place] [address], [address] [place]    Pager: [phone]

## 2017-04-27 NOTE — TELEPHONE ENCOUNTER
I called pt per [facility] pt has an appointment with our office on Monday 05/01/2017 and a appointment with his PCP Dr. Erich Gutierrez on 05/02/2017. Per Hilaria Du pt should follow up with PCP pt verbalized understanding.

## 2017-04-27 NOTE — PROGRESS NOTES
Met with pt who was fully dressed and had bags packed as he sated Dr had just informed him of discharge has ride home with niece- reviewed plan to fill all Rx at [facility] and use Good Rx coupon & to contact Dr, Lamond Sacks ASAP if any med too expensive to fill- he sated his sister has offered to assist with any cost of meds as as resource he was 'too stubborn ' to accept in past.  Also will contact DSS to re-apply for Medicaid as it apparently was disenrolled for unknown reason.

## 2017-04-27 NOTE — DISCHARGE INSTRUCTIONS
Chest Pain: Care Instructions  Your Care Instructions  There are many things that can cause chest pain. Some are not serious and will get better on their own in a few days. But some kinds of chest pain need more testing and treatment. Your doctor may have recommended a follow-up visit in the next 8 to 12 hours. If you are not getting better, you may need more tests or treatment. Even though your doctor has released you, you still need to watch for any problems. The doctor carefully checked you, but sometimes problems can develop later. If you have new symptoms or if your symptoms do not get better, get medical care right away. If you have worse or different chest pain or pressure that lasts more than 5 minutes or you passed out (lost consciousness), call 911 or seek other emergency help right away. A medical visit is only one step in your treatment. Even if you feel better, you still need to do what your doctor recommends, such as going to all suggested follow-up appointments and taking medicines exactly as directed. This will help you recover and help prevent future problems. How can you care for yourself at home? · Rest until you feel better. · Take your medicine exactly as prescribed. Call your doctor if you think you are having a problem with your medicine. · Do not drive after taking a prescription pain medicine. When should you call for help? Call 911 if:  · You passed out (lost consciousness). · You have severe difficulty breathing. · You have symptoms of a heart attack. These may include:  ¨ Chest pain or pressure, or a strange feeling in your chest.  ¨ Sweating. ¨ Shortness of breath. ¨ Nausea or vomiting. ¨ Pain, pressure, or a strange feeling in your back, neck, jaw, or upper belly or in one or both shoulders or arms. ¨ Lightheadedness or sudden weakness. ¨ A fast or irregular heartbeat.   After you call 911, the  may tell you to chew 1 adult-strength or 2 to 4 low-dose aspirin. Wait for an ambulance. Do not try to drive yourself. Call your doctor today if:  · You have any trouble breathing. · Your chest pain gets worse. · You are dizzy or lightheaded, or you feel like you may faint. · You are not getting better as expected. · You are having new or different chest pain. Where can you learn more? Go to http://tung-madeline.info/. Enter A120 in the search box to learn more about \"Chest Pain: Care Instructions. \"  Current as of: May 27, 2016  Content Version: 11.2  © 4300-9232 [employer]. Care instructions adapted under license by [employer] (which disclaims liability or warranty for this information). If you have questions about a medical condition or this instruction, always ask your healthcare professional. [address] any warranty or liability for your use of this information.

## 2017-11-18 NOTE — ED NOTES
Patient transported to [facility] for further treatment. Patient is aware of plan of care at this time.

## 2017-11-18 NOTE — ED PROVIDER NOTES
HPI Comments: 9:25 AM Juliane Quinonez is a 80 y.o. male with a history of CHF, diabetes, and HTN presents to ED c/o constant generalized abd pain ongoing for the past 2- 3 weeks and progressively worsened. Pt did not come to the ED sooner because he \"wasn't ready\" per caregiver. Associated symptoms include leg swelling (x 4 days) palpitations (onset this morning ), SOB, change in appetite, vomiting, fever, diarrhea, increase in falls, fatigue, and generalized weakness in legs and arms. Pt caregiver states that he is unable to keep down fluids, tried to take a sip of water. The fever has been intermittent and was 101, 2 days ago. Pt has had an increase in falls, \"lost strength\" in his legs. Reports a total of 6 falls within the last week resulting in a busted lip and he also hit his nose. The palpitations increase this morning and pt states it felt as if his \"heart was beating out of his chest\". Pt has not followed up with his PCP (Dr. Amie Clement). Not on any blood thinners. Hx of a heart attack (unsure of date). Pt is on Lasix. Admits to tobacco use, 1 pack per week. No recreational drug or ETOH use. 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 No further complaints at the moment. The history is provided by the patient and a caregiver. Past Medical History:   Diagnosis Date    Diabetes ([address]     Hypertension        Past Surgical History:   Procedure Laterality Date    NEUROLOGICAL PROCEDURE UNLISTED           History reviewed. No pertinent family history. Social History     Social History    Marital status: SINGLE     Spouse name: N/A    Number of children: N/A    Years of education: N/A     Occupational History    Not on file.      Social History Main Topics    Smoking status: Current Every Day Smoker     Packs/day: 0.25    Smokeless tobacco: Never Used    Alcohol use No    Drug use: No    Sexual activity: No     Other Topics Concern    Not on file     Social History Narrative         ALLERGIES: Review of patient's allergies indicates no known allergies. Review of Systems   Constitutional: Positive for appetite change, fatigue and fever. Positive for generalized weakness in arms  Positive for generalized weakness in legs    Respiratory: Positive for shortness of breath. Cardiovascular: Positive for palpitations and leg swelling. Gastrointestinal: Positive for abdominal pain, diarrhea and vomiting. All other systems reviewed and are negative. Vitals:    11/18/17 0917   BP: (!) 172/113   Pulse: 90   Resp: 18   Temp: 98.1 °F (36.7 °C)   SpO2: 97%   Weight: 45.4 kg (100 lb)   Height: 5' 5\" (1.651 m)            Physical Exam   Constitutional: He is oriented to person, place, and time. He appears well-developed and well-nourished. Non-toxic appearance. He does not have a sickly appearance. He does not appear ill. No distress. HENT:   Head: Normocephalic and atraumatic. Mouth/Throat: Oropharynx is clear and moist. No oropharyngeal exudate. Eyes: Conjunctivae and EOM are normal. Pupils are equal, round, and reactive to light. No scleral icterus. Neck: Trachea normal and normal range of motion. Neck supple. No hepatojugular reflux and no JVD present. No tracheal deviation present. No thyromegaly present. Cardiovascular: Normal rate, regular rhythm, S1 normal, S2 normal, normal heart sounds, intact distal pulses and normal pulses. Exam reveals no gallop, no S3 and no S4. No murmur heard. Pulses:       Radial pulses are 2+ on the right side, and 2+ on the left side. Dorsalis pedis pulses are 2+ on the right side, and 2+ on the left side. Pulmonary/Chest: Effort normal and breath sounds normal. No accessory muscle usage. No respiratory distress. He has no decreased breath sounds. He has no wheezes. He has no rhonchi. He has no rales. Abdominal: Soft.  Normal appearance and bowel sounds are normal. He exhibits no shifting dullness, no distension, no pulsatile liver, no fluid wave, no abdominal bruit, no ascites, no pulsatile midline mass and no mass. There is no hepatosplenomegaly. There is generalized tenderness. There is no rigidity, no rebound, no guarding, no CVA tenderness, no tenderness at McBurney's point and negative Andrea's sign. Musculoskeletal: Normal range of motion. He exhibits edema (2+ feet bilateratl and right leg greater than left ). He exhibits no tenderness. Strength 2/5 throughout    Lymphadenopathy:        Head (right side): No submental, no submandibular, no preauricular and no occipital adenopathy present. Head (left side): No submental, no submandibular, no preauricular and no occipital adenopathy present. He has no cervical adenopathy. Right: No supraclavicular adenopathy present. Left: No supraclavicular adenopathy present. Neurological: He is alert and oriented to person, place, and time. He has normal strength and normal reflexes. He is not disoriented. No cranial nerve deficit or sensory deficit. Coordination and gait normal. GCS eye subscore is 4. GCS verbal subscore is 5. GCS motor subscore is 6. Grossly intact    Skin: Skin is warm, dry and intact. No rash noted. He is not diaphoretic. Psychiatric: He has a normal mood and affect. His speech is normal and behavior is normal. Judgment and thought content normal. Cognition and memory are normal.   Nursing note and vitals reviewed.        MDM  Number of Diagnoses or Management Options  Abdominal pain, epigastric:   Hepatic trauma, initial encounter:   Diagnosis management comments: DDX: Gastritis, gerd, peptic ulcer disease, cholecystitis, pancreatitis, gastroenteritis, hepatitis, constipation related pain, appendicitis pain, diverticulitis, urinary tract infection, obstruction, abdominal wall pain, atypical cardiac (ami or anginal pain), referred pain from pulmonary process (pneumonia, empyema),  or combination of the above versus many other processes. ED Course       Procedures    1:43 PM Consult: I discussed care with Radiologist.  It was a standard discussion including patient history, chief complaint, available diagnostic results, and predicted treatment course. Per radiologist pt has amorphic density consistent with acute liver laceration. 2:19 PM Consult: I discussed care with Dr. Arleen Sims (Trauma Surgeon at [facility]). It was a standard discussion including patient history, chief complaint, available diagnostic results, and predicted treatment course. Agrees to transfer to the ED at [facility].     2:22 PM Consult: I discussed care with Dr. Saurabh Almeida (ED Physician at [facility]). It was a standard discussion including patient history, chief complaint, available diagnostic results, and predicted treatment course. Agrees to accept pt. Scribe Attestation     Cari Hickman (Aj) acting as a scribe for and in the presence of Sophie Acharya DO      November 18, 2017 at 10:11 AM       Provider Attestation:      I personally performed the services described in the documentation, reviewed the documentation, as recorded by the scribe in my presence, and it accurately and completely records my words and actions.  November 18, 2017 at 10:11 AM - Lesia Martinez DO

## 2017-11-18 NOTE — ED TRIAGE NOTES
Patient arrived from home c.o shortness of breath and abdominal pain for the past two weeks. Patient denies taking any pain medications at this time. Patient rates pain 10/10.

## 2017-11-23 NOTE — ED TRIAGE NOTES
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 He has been having dark colored stools and swollen abdomen tender to touch LUQ small mass noted.

## 2017-11-23 NOTE — ED PROVIDER NOTES
EMERGENCY DEPARTMENT HISTORY AND PHYSICAL EXAM    2:46 PM      Date: 11/23/2017  Patient Name: Rusty Muñoz    History of Presenting Illness     Chief Complaint   Patient presents with    Abdominal Pain         History Provided By: Patient and EMS     Chief Complaint: Abdominal pain   Timing:  Constant  Location: Diffuse   Modifying Factors: None  Associated Symptoms: denies any other associated signs or symptoms      Additional History (Context):     Rusty Muñoz is a 80 y.o. male with a pertinent history of HTN, DM, and hepatic CA presents to the ED via EMS for evaluation. EMS reports pt was discharged from [facility] 1 hour prior to calling EMS. Now in the ED, pt states he fell after he arrived home because \"my knees gave out. \" Pt c/o constant generalized abd pain and B/L LE swelling. Reports he has difficulty ambulating because \"my legs keep giving out on me\" for the past 4 days. Pt denies back pain. No other acute symptoms or complaints were noted. PCP: Ashley Patel MD     Current Outpatient Prescriptions   Medication Sig Dispense Refill    aspirin delayed-release 81 mg tablet Take 1 Tab by mouth daily. 30 Tab 0    felodipine (PLENDIL SR) 10 mg 24 hr tablet Take 1 Tab by mouth daily. 30 Tab 1    hydroCHLOROthiazide (HYDRODIURIL) 25 mg tablet Take 1 Tab by mouth daily. 30 Tab 0    lisinopril (PRINIVIL, ZESTRIL) 10 mg tablet Take 1 Tab by mouth daily. 30 Tab 0       Past History     Past Medical History:  Past Medical History:   Diagnosis Date    Diabetes ([address]     Hypertension        Past Surgical History:  Past Surgical History:   Procedure Laterality Date    NEUROLOGICAL PROCEDURE UNLISTED         Family History:  No family history on file.     Social History:  Social History   Substance Use Topics    Smoking status: Current Every Day Smoker     Packs/day: 0.25    Smokeless tobacco: Never Used    Alcohol use No       Allergies:  No Known Allergies      Review of Systems     Review of Systems   Constitutional: Negative for chills and fever. Respiratory: Negative for shortness of breath. Cardiovascular: Positive for leg swelling. Negative for chest pain. Gastrointestinal: Positive for abdominal pain. Negative for diarrhea, nausea and vomiting. Musculoskeletal: Negative for back pain. Neurological: Negative for headaches. Psychiatric/Behavioral: Negative for behavioral problems. All other systems reviewed and are negative. Physical Exam     Visit Vitals    /81    Pulse (!) 59    Temp 97.3 °F (36.3 °C)    Resp 16    Ht 5' 5\" (1.651 m)    Wt 47.6 kg (105 lb)    SpO2 100%    BMI 17.47 kg/m2     Physical Exam   Constitutional: He is oriented to person, place, and time. He appears well-developed and well-nourished. He appears cachectic. No distress. HENT:   Head: Normocephalic and atraumatic. Eyes: Conjunctivae and EOM are normal. Right eye exhibits no discharge. Left eye exhibits no discharge. No scleral icterus. Neck: Normal range of motion. Neck supple. No tracheal deviation present. Cardiovascular: Normal rate, regular rhythm and normal heart sounds. No murmur heard. Pulmonary/Chest: Effort normal and breath sounds normal. No respiratory distress. He has no wheezes. He has no rales. Abdominal: Soft. He exhibits no distension. There is tenderness (diffusse TTP). There is no rebound and no guarding. Musculoskeletal: Normal range of motion. He exhibits no edema or deformity. Neurological: He is alert and oriented to person, place, and time. No cranial nerve deficit. Skin: Skin is warm and dry. He is not diaphoretic. Psychiatric: He has a normal mood and affect.  His behavior is normal. Judgment and thought content normal.       Diagnostic Study Results     Labs -  Recent Results (from the past 12 hour(s))   CBC WITH AUTOMATED DIFF    Collection Time: 11/23/17  2:48 PM   Result Value Ref Range    WBC 5.1 4.6 - 13.2 K/uL    RBC 3.03 (L) 4.70 - 5.50 M/uL    HGB 9.9 (L) 13.0 - 16.0 g/dL    HCT 29.1 (L) 36.0 - 48.0 %    MCV 96.0 74.0 - 97.0 FL    MCH 32.7 24.0 - 34.0 PG    MCHC 34.0 31.0 - 37.0 g/dL    RDW 15.1 (H) 11.6 - 14.5 %    PLATELET 774 (L) 341 - 420 K/uL    MPV 12.6 (H) 9.2 - 11.8 FL    NEUTROPHILS 70 40 - 73 %    LYMPHOCYTES 26 21 - 52 %    MONOCYTES 4 3 - 10 %    EOSINOPHILS 0 0 - 5 %    BASOPHILS 0 0 - 2 %    ABS. NEUTROPHILS 3.6 1.8 - 8.0 K/UL    ABS. LYMPHOCYTES 1.3 0.9 - 3.6 K/UL    ABS. MONOCYTES 0.2 0.05 - 1.2 K/UL    ABS. EOSINOPHILS 0.0 0.0 - 0.4 K/UL    ABS. BASOPHILS 0.0 0.0 - 0.1 K/UL    DF AUTOMATED     METABOLIC PANEL, COMPREHENSIVE    Collection Time: 11/23/17  2:48 PM   Result Value Ref Range    Sodium 140 136 - 145 mmol/L    Potassium 3.8 3.5 - 5.5 mmol/L    Chloride 104 100 - 108 mmol/L    CO2 25 21 - 32 mmol/L    Anion gap 11 3.0 - 18 mmol/L    Glucose 117 (H) 74 - 99 mg/dL    BUN 85 (H) 7.0 - 18 MG/DL    Creatinine 2.00 (H) 0.6 - 1.3 MG/DL    BUN/Creatinine ratio 43 (H) 12 - 20      GFR est AA 39 (L) >60 ml/min/1.73m2    GFR est non-AA 32 (L) >60 ml/min/1.73m2    Calcium 7.7 (L) 8.5 - 10.1 MG/DL    Bilirubin, total 1.9 (H) 0.2 - 1.0 MG/DL    ALT (SGPT) 90 (H) 16 - 61 U/L    AST (SGOT) 86 (H) 15 - 37 U/L    Alk. phosphatase 805 (H) 45 - 117 U/L    Protein, total 5.4 (L) 6.4 - 8.2 g/dL    Albumin 2.2 (L) 3.4 - 5.0 g/dL    Globulin 3.2 2.0 - 4.0 g/dL    A-G Ratio 0.7 (L) 0.8 - 1.7     POC LACTIC ACID    Collection Time: 11/23/17  3:26 PM   Result Value Ref Range    Lactic Acid (POC) 2.9 (HH) 0.4 - 2.0 mmol/L       Radiologic Studies -   No orders to display         Medical Decision Making   I am the first provider for this patient. I reviewed the vital signs, available nursing notes, past medical history, past surgical history, family history and social history. Vital Signs-Reviewed the patient's vital signs.     Records Reviewed: Nursing Notes and Old Medical Records (Time of Review: 2:46 PM)    ED Course: Progress Notes, Reevaluation, and Consults:    Provider Notes (Medical Decision Making):   Pt was discharged 1 hour prior to calling EMS. On review of records from [facility], pt needed subacute nursing care, but pt refuse to go to the facility. Pt refused and said he has help from his girlfriend and niece. I attempted to reach pt's family multiple times with no answer. Pt was worked up for abd pain at [facility], was found to have Hepatic CA. Inpatient workup was completed there, and pt has no further inpatient needs besides physical therapy, which pt refused. Will continue to try to reach pt's family. Pt now getting IV fluids. Case management will see patient in the morning. 6:37 PM discussed with [place] pt was discharged today. States pt's medical work up was complete, and he no longer has inpatient needs. Pt refused to work with physical therapy. Pt was adamant about leaving and not going to a SNF. Home health services has been set up for pt.     7:45 PM Went see the pt. Pt asked to borrow my pen and attempted to stab himself, stating that he wanted to kill himself. Pen was removed from pt. Pt continues to refuse to stand. Evening  charge nurse,  Edmond Marrero, and daytime charge nurse are attempting to reach IP case management. 8:07 PM : Pt care transferred to Dr. Bruce Culver, ED provider. History of patient complaint(s), available diagnostic reports and current treatment plan has been discussed thoroughly. Bedside rounding on patient occured : yes. Intended disposition of patient : TBD  Pending diagnostics reports and/or labs (please list): Pending IP case management       Diagnosis     Clinical Impression:   1.  Hepatic tumor      _______________________________    Attestations:  Scribe Attestation     Magdalena Moreno acting as a scribe for and in the presence of Cassandra Puentes MD      November 23, 2017 at 2:46 PM       Provider Attestation:      I personally performed the services described in the documentation, reviewed the documentation, as recorded by the scribe in my presence, and it accurately and completely records my words and actions.  November 23, 2017 at 2:46 PM - Taye Oliveira MD    _______________________________

## 2017-11-24 NOTE — ED NOTES
Report given to [facility]. Patient resting comfortably on stretcher at this time. Call light within reach. Patient caregiver at bedside.

## 2017-11-24 NOTE — ED NOTES
Report received from Gerardo Eric. Patient resting comfortably on stretcher at this time. Patient denies need for anything at this time. Call light within reach.

## 2017-11-24 NOTE — DISCHARGE INSTRUCTIONS
Liver Cancer: Care Instructions  Your Care Instructions    Liver cancer occurs when abnormal cells grow out of control in the liver. Many times the cancer starts at another part of the body, like the colon or lung, and spreads to the liver. Other times, the cancer starts in the liver. Treatment for liver cancer depends on what kind of cancer you have and how far it has spread. You may need surgery, radiation therapy, or chemotherapy. Another treatment uses different methods-radio waves, freezing, or injecting chemicals directly into the tumor-to kill cancer cells. You may need more than one of kind of treatment. If your cancer cannot be cured, the goal may be to remove or destroy as much of the tumor as possible. This can prevent cancer from growing, spreading, or returning for as long as possible. Treatment with chemotherapy or radiation can make you feel very tired and sick to your stomach and may cause diarrhea. It also can make your immune system weaker. This can raise your risk of infection. Finding out that you have cancer is scary. You may feel many emotions and may need some help coping. Seek out family, friends, and counselors for support. You also can do things at home to make yourself feel better while you go through treatment. Call the [employer] (5-449.790.9433) or visit its website at [address] for more information. Follow-up care is a key part of your treatment and safety. Be sure to make and go to all appointments, and call your doctor if you are having problems. It's also a good idea to know your test results and keep a list of the medicines you take. How can you care for yourself at home? · Take your medicines exactly as prescribed. Call your doctor if you have any problems with your medicine. You may get medicine for nausea and vomiting if you have these side effects. · Eat healthy food.  If you do not feel like eating, try to eat food that has protein and extra calories to keep up your strength and prevent weight loss. Drink liquid meal replacements for extra calories and protein. Try to eat your main meal early. Some people do better with small, frequent meals rather than one or two large ones. · Get some physical activity every day, but do not get too tired. Keep doing the hobbies you enjoy as your energy allows. · Take steps to control your stress and workload. Learn relaxation techniques. ¨ Share your feelings. Stress and tension affect our emotions. By expressing your feelings to others, you may be able to understand and cope with them. ¨ Consider joining a support group. Talking about a problem with your spouse, a good friend, or other people with similar problems is a good way to reduce tension and stress. ¨ Express yourself through art. Try writing, crafts, dance, or art to relieve stress. Some dance, writing, or art groups may be available just for people who have cancer. ¨ Be kind to your body and mind. Getting enough sleep, eating a healthy diet, and taking time to do things you enjoy can contribute to an overall feeling of balance in your life and can help reduce stress. ¨ Get help if you need it. Discuss your concerns with your doctor, counselor, or other health professional.  · If you are vomiting or have diarrhea:  ¨ Drink plenty of fluids (enough so that your urine is light yellow or clear like water) to prevent dehydration. Choose water and other caffeine-free clear liquids. If you have kidney, heart, or liver disease and have to limit fluids, talk with your doctor before you increase the amount of fluids you drink. ¨ When you are able to eat, try clear soups, mild foods, and liquids until all symptoms are gone for 12 to 48 hours. Other good choices include dry toast, crackers, cooked cereal, and gelatin dessert, such as Jell-O.  · If you have not already done so, prepare a list of advance directives.  Advance directives are instructions to your doctor and family members about what kind of care you want if you become unable to speak or express yourself. When should you call for help? Call 911 anytime you think you may need emergency care. For example, call if:  ? · You have trouble breathing. ? · You vomit blood or what looks like coffee grounds. ?Call your doctor now or seek immediate medical care if:  ? · You have any abnormal bleeding, such as:  ¨ Nosebleeds. ¨ Vaginal bleeding that is different (heavier, more frequent, at a different time of the month) than what you are used to. ¨ Bloody or black stools, or rectal bleeding. ¨ Bloody or pink urine. ? · You have a fever. ? · You feel very sleepy or confused. ? · You have new or worse belly pain. ? · There is a new or increasing yellow tint to your skin or the whites of your eyes. ? Watch closely for changes in your health, and be sure to contact your doctor if:  ? · You have any problems. ? · You are gaining weight. ? · Your belly is getting bigger. Where can you learn more? Go to http://tung-madeline.info/. Enter P483 in the search box to learn more about \"Liver Cancer: Care Instructions. \"  Current as of: May 12, 2017  Content Version: 11.4  © 1252-8673 [employer]. Care instructions adapted under license by [employer] (which disclaims liability or warranty for this information). If you have questions about a medical condition or this instruction, always ask your healthcare professional. [place] [address] any warranty or liability for your use of this information.

## 2017-11-24 NOTE — ED NOTES
Pt resting on stretcher in no apparent distress at this time. Door open, lights dimmed, and call bell within reach. Will continue to monitor awaiting disposition.

## 2017-11-24 NOTE — ED NOTES
Pt uncooperative. Pt refusing DC. Jarad Quiroga MD and Charge nurses Kenna and Chris TENORIO At bedside attempting to find a solution for DC.

## 2017-11-24 NOTE — ED NOTES
Pt turned over to me dr Dorene Blair. Pt discharged from [facility]; taken home; fell at home. Family who were supposed to help at home but are not home. Pt feel weak and unsafe going home. Will obs in ED for social work/ d/c planning in am.     No other aggravating or alleviating factors. No other associated symptoms. General; AOX3. Pulmonary; CTA-B. Cardiac: RRR no MRG; Abd S/NT/ND. Musc; full painless passive rom on b/l LE. Melchor Bias neg. Nttp. Plan: obs .      7:29 AM turned over to dr Latia Jimenez pending dispo.

## 2017-11-24 NOTE — PROGRESS NOTES
This writer spoke with this pt's close friend  Who was at bedside and pt's [facility]([phone]) by phone only, who state that they want this pt admitted to this hospital or a rehab center, instead of this pt returning to his home with no support. The pt however, reports that he does have support at home. Pt reports that his family came to pick him up from [facility], on 11/22/17, the family member bought him home with no food and did not assist him him going into his home, they watch him fall and they bought him to this facility. The niece states that this pt needs hospitalization([facility]) and does not need to be home. Niece states she is concerned about her uncle and believes although he is requesting to return home it is unsafe. Friend and family all state things that they are doing in their personal lives and they cannot provide care for this pt if he returns home. This pt does qualify to be admitted to a local SNF([facility]) which was willing to consider accepting this pt into their facility. Pt is very adamant about not being admitted to the facility, preferring to be placed at his home with the support of his neighbors, who offers him assistance. Pt was informed of unsafe situations that was described by his family and friend, but pt continues to report that his only desire is to return home. Family and friend are refusing to provide support if this pt returns home, because they are of the belief that this pt requires inpatient hospitalization, which pt is also refusing at this time. This pt when discharged will be referred to [facility] for assistance, family was also informed that pt appears to need a POA since they believe that this pt does not make safe decisions, and they have his best interest.   This information was provided to this physician treating this pt and nurse providing care. This pt will be reported to [facility] for investigation and assistance.  This writer will also call [address], if they would accept this pt if niece will sign him into the facility.

## 2017-11-24 NOTE — ED NOTES
Patient is irate in talking with staff about discharge, \"I cant go home, because Im going to fall out again , you don't know what its like lying in the cold waiting for someone to come get you\"  Patient asked to see the Dr's pen, in attempt to stab himself, \"im going to kill myself since ya'll are trying to kill me\"

## 2017-11-25 PROBLEM — I50.9 HEART FAILURE (HCC): Status: ACTIVE | Noted: 2017-01-01

## 2017-11-25 NOTE — ED NOTES
Bedside report given by Reynaldo Falk RN.  Pt alert and oriented resting on stretcher at this time, safety intact

## 2017-11-25 NOTE — ED NOTES
Called patients niece and voicemail message left stating that patient will be discharged to home due to the fact that he continues to refuse SNF services

## 2017-11-25 NOTE — IP AVS SNAPSHOT
Fili Ang 
 
 
 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  
@ 10:00 Phone:  [phone] Where:  110[address] [facility], , [address] [place] [address] Discharge Orders None A check aleisha indicates which time of day the medication should be taken. My Medications ASK your physician about these medications Instructions Each Dose to Equal  
 Morning Noon Evening Bedtime  
 aspirin delayed-release 81 mg tablet Your last dose was: Your next dose is: Take 1 Tab by mouth daily. 81 mg  
    
   
   
   
  
 felodipine 10 mg 24 hr tablet Commonly known as:  PLENDIL SR Your last dose was: Your next dose is: Take 1 Tab by mouth daily. 10 mg  
    
   
   
   
  
 hydroCHLOROthiazide 25 mg tablet Commonly known as:  HYDRODIURIL Your last dose was: Your next dose is: Take 1 Tab by mouth daily. 25 mg  
    
   
   
   
  
 lisinopril 10 mg tablet Commonly known as:  [place] Escort Your last dose was: Your next dose is: Take 1 Tab by mouth daily. 10 mg Discharge Instructions DISCHARGE SUMMARY from Nurse PATIENT INSTRUCTIONS: 
 
 
F-face looks uneven A-arms unable to move or move unevenly S-speech slurred or non-existent T-time-call 911 as soon as signs and symptoms begin-DO NOT go Back to bed or wait to see if you get better-TIME IS BRAIN. Warning Signs of HEART ATTACK Call 911 if you have these symptoms: 
? Chest discomfort. Most heart attacks involve discomfort in the center of the chest that lasts more than a few minutes, or that goes away and comes back. It can feel like uncomfortable pressure, squeezing, fullness, or pain. ? Discomfort in other areas of the upper body. Symptoms can include pain or discomfort in one or both arms, the back, neck, jaw, or stomach. ? Shortness of breath with or without chest discomfort. ? Other signs may include breaking out in a cold sweat, nausea, or lightheadedness. Don't wait more than five minutes to call [address]! Fast action can save your life. Calling 911 is almost always the fastest way to get lifesaving treatment. Emergency Medical Services staff can begin treatment when they arrive  up to an hour sooner than if someone gets to the hospital by car. The discharge information has been reviewed with the patient. The patient verbalized understanding. Discharge medications reviewed with the patient and appropriate educational materials and side effects teaching were provided. ___________________________________________________________________________________________________________________________________ Heart Failure: Care Instructions Your Care Instructions Heart failure occurs when your heart does not pump as much blood as the body needs. Failure does not mean that the heart has stopped pumping but rather that it is not pumping as well as it should. Over time, this causes fluid buildup in your lungs and other parts of your body. Fluid buildup can cause shortness of breath, fatigue, swollen ankles, and other problems. By taking medicines regularly, reducing sodium (salt) in your diet, checking your weight every day, and making lifestyle changes, you can feel better and live longer. Follow-up care is a key part of your treatment and safety. Be sure to make and go to all appointments, and call your doctor if you are having problems. It's also a good idea to know your test results and keep a list of the medicines you take. How can you care for yourself at home? Medicines ? · Be safe with medicines. Take your medicines exactly as prescribed. Call your doctor if you think you are having a problem with your medicine. ? · Do not take any vitamins, over-the-counter medicine, or herbal products without talking to your doctor first. Oliver Aden not take ibuprofen (Advil or Motrin) and naproxen (Aleve) without talking to your doctor first. They could make your heart failure worse. ? · You may be taking some of the following medicine. ¨ Beta-blockers can slow heart rate, decrease blood pressure, and improve your condition. Taking a beta-blocker may lower your chance of needing to be hospitalized. ¨ Angiotensin-converting enzyme inhibitors (ACEIs) reduce the heart's workload, lower blood pressure, and reduce swelling. Taking an ACEI may lower your chance of needing to be hospitalized again. ¨ Angiotensin II receptor blockers (ARBs) work like ACEIs. Your doctor may prescribe them instead of ACEIs. ¨ Diuretics, also called water pills, reduce swelling. ¨ Potassium supplements replace this important mineral, which is sometimes lost with diuretics. ¨ Aspirin and other blood thinners prevent blood clots, which can cause a stroke or heart attack. ? You will get more details on the specific medicines your doctor prescribes. Diet ? · Your doctor may suggest that you limit sodium to 2,000 milligrams (mg) a day or less. That is less than 1 teaspoon of salt a day, including all the salt you eat in cooking or in packaged foods. People get most of their sodium from processed foods. Fast food and restaurant meals also tend to be very high in sodium. ? · Ask your doctor how much liquid you can drink each day. You may have to limit liquids. ?Weight ? · Weigh yourself without clothing at the same time each day. Record your weight. Call your doctor if you have a sudden weight gain, such as more than 2 to 3 pounds in a day or 5 pounds in a week. (Your doctor may suggest a different range of weight gain.) A sudden weight gain may mean that your heart failure is getting worse. ? Activity level ? · Start light exercise (if your doctor says it is okay). Even if you can only do a small amount, exercise will help you get stronger, have more energy, and manage your weight and your stress. Walking is an easy way to get exercise. Start out by walking a little more than you did before. Bit by bit, increase the amount you walk. ? · When you exercise, watch for signs that your heart is working too hard. You are pushing yourself too hard if you cannot talk while you are exercising. If you become short of breath or dizzy or have chest pain, stop, sit down, and rest.  
? · If you feel \"wiped out\" the day after you exercise, walk slower or for a shorter distance until you can work up to a better pace. ? · Get enough rest at night. Sleeping with 1 or 2 pillows under your upper body and head may help you breathe easier. ? Lifestyle changes ? · Do not smoke. Smoking can make a heart condition worse.  If you need help quitting, talk to your doctor about stop-smoking programs and medicines. These can increase your chances of quitting for good. Quitting smoking may be the most important step you can take to protect your heart. ? · Limit alcohol to 2 drinks a day for men and 1 drink a day for women. Too much alcohol can cause health problems. ? · Avoid getting sick from colds and the flu. Get a pneumococcal vaccine shot. If you have had one before, ask your doctor whether you need another dose. Get a flu shot each year. If you must be around people with colds or the flu, wash your hands often. When should you call for help? Call 911 if you have symptoms of sudden heart failure such as: 
? · You have severe trouble breathing. ? · You cough up pink, foamy mucus. ? · You have a new irregular or rapid heartbeat. ?Call your doctor now or seek immediate medical care if: 
? · You have new or increased shortness of breath. ? · You are dizzy or lightheaded, or you feel like you may faint. ? · You have sudden weight gain, such as more than 2 to 3 pounds in a day or 5 pounds in a week. (Your doctor may suggest a different range of weight gain.) ? · You have increased swelling in your legs, ankles, or feet. ? · You are suddenly so tired or weak that you cannot do your usual activities. ? Watch closely for changes in your health, and be sure to contact your doctor if you develop new symptoms. Where can you learn more? Go to http://tung-madeline.info/. Enter B638 in the search box to learn more about \"Heart Failure: Care Instructions. \" Current as of: September 21, 2016 Content Version: 11.4 © 5290-1849 [employer]. Care instructions adapted under license by [employer] (which disclaims liability or warranty for this information). If you have questions about a medical condition or this instruction, always ask your healthcare professional. [address] any warranty or liability for your use of this information. Limiting Sodium and Fluids With Heart Failure: Care Instructions Your Care Instructions Sodium causes your body to hold on to extra water. This may cause your heart failure symptoms to get worse. Limiting sodium may help you feel better and lower your risk of having to go to the hospital. 
People get most of their sodium from processed foods. Fast food and restaurant meals also tend to be very high in sodium. Your doctor may suggest that you limit sodium to 2,000 milligrams (mg) a day or less. That is less than 1 teaspoon of salt a day, including all the salt you eat in cooked or packaged foods. Usually, you have to limit the amount of liquids you drink only if your heart failure is severe. Limiting sodium alone often is enough to help your body get rid of extra fluids. However, your doctor may tell you to limit your fluid intake to a set amount each day. Follow-up care is a key part of your treatment and safety. Be sure to make and go to all appointments, and call your doctor if you are having problems. It's also a good idea to know your test results and keep a list of the medicines you take. How can you care for yourself at home? Read food labels · Read food labels on cans and food packages. The labels tell you how much sodium is in each serving. Make sure that you look at the serving size. If you eat more than the serving size, you have eaten more sodium than is listed for one serving. · Food labels also tell you the Percent Daily Value. If the Percent Daily Value says 50%, it means that you will get at least 50% of all the sodium you need for the entire day in one serving. Choose products with low Percent Daily Values for sodium. · Be aware that sodium can come in forms other than salt, including monosodium glutamate (MSG), sodium citrate, and sodium bicarbonate (baking soda). MSG is often added to Asian food. You can sometimes ask for food without MSG or salt. Buy low-sodium foods · Buy foods that are labeled \"unsalted\" (no salt added), \"sodium-free\" (less than 5 mg of sodium per serving), or \"low-sodium\" (less than 140 mg of sodium per serving). A food labeled \"light sodium\" has less than half of the full-sodium version of that food. Foods labeled \"reduced-sodium\" may still have too much sodium. · Buy fresh vegetables or plain, frozen vegetables. Buy low-sodium versions of canned vegetables, soups, and other canned goods. Prepare low-sodium meals · Use less salt each day when cooking. Reducing salt in this way will help you adjust to the taste. Do not add salt after cooking. Take the salt shaker off the table. · Flavor your food with garlic, lemon juice, onion, vinegar, herbs, and spices instead of salt. Do not use soy sauce, steak sauce, onion salt, garlic salt, mustard, or ketchup on your food. · Make your own salad dressings, sauces, and ketchup without adding salt. · Use less salt (or none) when recipes call for it. You can often use half the salt a recipe calls for without losing flavor. Other dishes like rice, pasta, and grains do not need added salt. · Rinse canned vegetables. This removes some-but not all-of the salt. · Avoid water that has a naturally high sodium content or that has been treated with water softeners, which add sodium. Call your local water company to find out the sodium content of your water supply. If you buy bottled water, read the label and choose a sodium-free brand. Avoid high-sodium foods, such as: 
· Smoked, cured, salted, and canned meat, fish, and poultry. · Ham, wright, hot dogs, and luncheon meats. · Regular, hard, and processed cheese and regular peanut butter. · Crackers with salted tops. · Frozen prepared meals. · Canned and dried soups, broths, and bouillon, unless labeled sodium-free or low-sodium. · Canned vegetables, unless labeled sodium-free or low-sodium. · Salted snack foods such as chips and pretzels. · Western Thelma fries, pizza, tacos, and other fast foods. · Pickles, olives, ketchup, and other condiments, especially soy sauce, unless labeled sodium-free or low-sodium. If you cannot cook for yourself · Have family members or friends help you, or have someone cook low-sodium meals. · Check with your local senior nutrition program to find out where meals are served and whether they offer a low-sodium option. You can often find these programs through your local health department or hospital. 
· Have meals delivered to your home. Most [facility] have a Meals on PLDTney. These programs provide one hot meal a day for older adults, delivered to their homes. Ask whether these meals are low-sodium. Let them know that you are on a low-sodium diet. Limiting fluid intake · Find a method that works for you. You might simply write down how much you drink every time you do. Some people keep a container filled with the amount of fluid allowed for that day. If they drink from a source other than the container, then they pour out that amount. · Measure your regular drinking glasses to find out how much fluid each one holds. Once you know this, you will not have to measure every time. · Besides water, milk, juices, and other drinks, some foods have a lot of fluid. Count any foods that will melt (such as ice cream or gelatin dessert) or liquid foods (such as soup) as part of your fluid intake for the day. Where can you learn more? Go to http://tung-madeline.info/. Enter A166 in the search box to learn more about \"Limiting Sodium and Fluids With Heart Failure: Care Instructions. \" Current as of: September 21, 2016 Content Version: 11.4 © 7207-9018 [employer]. Care instructions adapted under license by [employer] (which disclaims liability or warranty for this information).  If you have questions about a medical condition or this instruction, always ask your healthcare professional. [place] [address] any warranty or liability for your use of this information. Leg and Ankle Edema: Care Instructions Your Care Instructions Swelling in the legs, ankles, and feet is called edema. It is common after you sit or stand for a while. Long plane flights or car rides often cause swelling in the legs and feet. You may also have swelling if you have to stand for long periods of time at your job. Problems with the veins in the legs (varicose veins) and changes in hormones can also cause swelling. Sometimes the swelling in the ankles and feet is caused by a more serious problem, such as heart failure, infection, blood clots, or liver or kidney disease. Follow-up care is a key part of your treatment and safety. Be sure to make and go to all appointments, and call your doctor if you are having problems. It's also a good idea to know your test results and keep a list of the medicines you take. How can you care for yourself at home? · If your doctor gave you medicine, take it as prescribed. Call your doctor if you think you are having a problem with your medicine. · Whenever you are resting, raise your legs up. Try to keep the swollen area higher than the level of your heart. · Take breaks from standing or sitting in one position. ¨ Walk around to increase the blood flow in your lower legs. ¨ Move your feet and ankles often while you stand, or tighten and relax your leg muscles. · Wear support stockings. Put them on in the morning, before swelling gets worse. · Eat a balanced diet. Lose weight if you need to. · Limit the amount of salt (sodium) in your diet. Salt holds fluid in the body and may increase swelling. When should you call for help? Call 911 anytime you think you may need emergency care.  For example, call if: 
? · You have symptoms of a blood clot in your lung (called a pulmonary embolism). These may include: 
¨ Sudden chest pain. ¨ Trouble breathing. ¨ Coughing up blood. ?Call your doctor now or seek immediate medical care if: 
? · You have signs of a blood clot, such as: 
¨ Pain in your calf, back of the knee, thigh, or groin. ¨ Redness and swelling in your leg or groin. ? · You have symptoms of infection, such as: 
¨ Increased pain, swelling, warmth, or redness. ¨ Red streaks or pus. ¨ A fever. ? Watch closely for changes in your health, and be sure to contact your doctor if: 
? · Your swelling is getting worse. ? · You have new or worsening pain in your legs. ? · You do not get better as expected. Where can you learn more? Go to http://tung-madeline.info/. Enter Y043 in the search box to learn more about \"Leg and Ankle Edema: Care Instructions. \" Current as of: March 20, 2017 Content Version: 11.4 © 0795-4331 [employer]. Care instructions adapted under license by [employer] (which disclaims liability or warranty for this information). If you have questions about a medical condition or this instruction, always ask your healthcare professional. [place] [address] any warranty or liability for your use of this information. Patient armband removed and shredded DISCHARGE SUMMARY from Nurse PATIENT INSTRUCTIONS: 
 
 
F-face looks uneven A-arms unable to move or move unevenly S-speech slurred or non-existent T-time-call 911 as soon as signs and symptoms begin-DO NOT go Back to bed or wait to see if you get better-TIME IS BRAIN. Warning Signs of HEART ATTACK Call 911 if you have these symptoms: ? Chest discomfort. Most heart attacks involve discomfort in the center of the chest that lasts more than a few minutes, or that goes away and comes back. It can feel like uncomfortable pressure, squeezing, fullness, or pain. ? Discomfort in other areas of the upper body. Symptoms can include pain or discomfort in one or both arms, the back, neck, jaw, or stomach. ? Shortness of breath with or without chest discomfort. ? Other signs may include breaking out in a cold sweat, nausea, or lightheadedness. Don't wait more than five minutes to call [address]! Fast action can save your life. Calling 911 is almost always the fastest way to get lifesaving treatment. Emergency Medical Services staff can begin treatment when they arrive  up to an hour sooner than if someone gets to the hospital by car. The discharge information has been reviewed with the patient and caregiver. The patient and caregiver verbalized understanding. Discharge medications reviewed with the patient and caregiver and appropriate educational materials and side effects teaching were provided. Patient armband removed and shredded MyChart Activation Thank you for requesting access to [email or web address]. Please follow the instructions below to securely access and download your online medical record. [email or web address] allows you to send messages to your doctor, view your test results, renew your prescriptions, schedule appointments, and more. How Do I Sign Up? 1. In your internet browser, go to [email or web address] 
2. Click on the First Time User? Click Here link in the Sign In box. You will be redirect to the New Member Sign Up page. 3. Enter your [email or web address] Access Code exactly as it appears below. You will not need to use this code after youve completed the sign-up process. If you do not sign up before the expiration date, you must request a new code.  
 
[email or web address] Access Code: [email or web address] 
 Expires: 2018  2:51 PM (This is the date your StatusNet access code will ) 4. Enter the last four digits of your Social Security Number (xxxx) and Date of Birth (mm/dd/yyyy) as indicated and click Submit. You will be taken to the next sign-up page. 5. Create a StatusNet ID. This will be your StatusNet login ID and cannot be changed, so think of one that is secure and easy to remember. 6. Create a StatusNet password. You can change your password at any time. 7. Enter your Password Reset Question and Answer. This can be used at a later time if you forget your password. 8. Enter your e-mail address. You will receive e-mail notification when new information is available in [address] 9. Click Sign Up. You can now view and download portions of your medical record. 10. Click the Download Summary menu link to download a portable copy of your medical information. Additional Information If you have questions, please visit the Frequently Asked Questions section of the StatusNet website at [email or web address] Remember, StatusNet is NOT to be used for urgent needs. For medical emergencies, dial 911. 
 
 
 
___________________________________________________________________________________________________________________________________ StatusNet Announcement We are excited to announce that we are making your provider's discharge notes available to you in StatusNet. You will see these notes when they are completed and signed by the physician that discharged you from your recent hospital stay. If you have any questions or concerns about any information you see in StatusNet, please call the Health Information Department where you were seen or reach out to your Primary Care Provider for more information about your plan of care. Introducing [facility] SERVICES!    
 Newton Marcial introduces StatusNet patient portal. Now you can access parts of your medical record, email your doctor's office, and request medication refills online. 1. In your internet browser, go to [email or web address] 2. Click on the First Time User? Click Here link in the Sign In box. You will see the New Member Sign Up page. 3. Enter your Marquee Access Code exactly as it appears below. You will not need to use this code after youve completed the sign-up process. If you do not sign up before the expiration date, you must request a new code. · Marquee Access Code: [email or web address] Expires: 2/16/2018  2:51 PM 
 
4. Enter the last four digits of your Social Security Number (xxxx) and Date of Birth (mm/dd/yyyy) as indicated and click Submit. You will be taken to the next sign-up page. 5. Create a Marquee ID. This will be your Marquee login ID and cannot be changed, so think of one that is secure and easy to remember. 6. Create a Marquee password. You can change your password at any time. 7. Enter your Password Reset Question and Answer. This can be used at a later time if you forget your password. 8. Enter your e-mail address. You will receive e-mail notification when new information is available in [address] 9. Click Sign Up. You can now view and download portions of your medical record. 10. Click the Download Summary menu link to download a portable copy of your medical information. If you have questions, please visit the Frequently Asked Questions section of the Marquee website. Remember, Marquee is NOT to be used for urgent needs. For medical emergencies, dial 911. Now available from your iPhone and Android! Unresulted Labs-Please follow up with your PCP about these lab tests Order Current Status HBV DNA, QUANT REAL-TIME PCR WITH REFLEX TO HBV GENOTYPE In process HEPATITIS BE AB In process CULTURE, BODY FLUID W GRAM STAIN Preliminary result Providers Seen During Your Hospitalization Provider Specialty Primary office phone [place] MD Emergency Medicine [phone] Danuta Ornelas MD [facility] [phone] Your Primary Care Physician (PCP) Primary Care Physician Office Phone Office Fax Dannie Smith [phone] You are allergic to the following No active allergies Recent Documentation Height Weight BMI Smoking Status 1.651 m 48.8 kg 17.89 kg/m2 Current Every Day Smoker Emergency Contacts Name Discharge Info Relation Home Work Mobile Tabitha Paz DISCHARGE CAREGIVER [3] Other Relative [6] [phone] Genie Morrison DECLINED CAREGIVER [4] Other Relative [6]   [phone] Patient Belongings The following personal items are in your possession at time of discharge: 
  Dental Appliances: None  Visual Aid: None      Home Medications: Kept at bedside   Jewelry: None  Clothing: Jacket/Coat    Other Valuables: Cell Phone Please provide this summary of care documentation to your next provider. Signatures-by signing, you are acknowledging that this After Visit Summary has been reviewed with you and you have received a copy. Patient Signature:  ____________________________________________________________ Date:  ____________________________________________________________  
  
[facility] Provider Signature:  ____________________________________________________________ Date:  ____________________________________________________________

## 2017-11-25 NOTE — ED TRIAGE NOTES
Per medic, \"his home health nurse called because he was unable to get up off of the couch. \"  The patient is complaining of generalized weakness. Denies chest pain or shortness of breath.

## 2017-11-25 NOTE — ED NOTES
Pt awaiting medical transport home, pt resting on stretcher, told to call sister, no number on file, pt states he does not have it.

## 2017-11-25 NOTE — ED NOTES
Had extensive conversations with multiple family members today. Niece came in and spoke with patient. Case management engaged in discussions with family and patient, pt continues to refuse to go to a nursing facility and family is not able to pick him up. Crisis evaluated pt, does not meet criteria for inpatient psych admission. Pt is competent and states he wants to go home, will be discharged accordingly.

## 2017-11-25 NOTE — ED PROVIDER NOTES
EMERGENCY DEPARTMENT HISTORY AND PHYSICAL EXAM    3:59 PM      Date: 11/25/2017  Patient Name: Rona Mckeon    History of Presenting Illness     Chief Complaint   Patient presents with    Extremity Weakness         History Provided By: Patient    Chief Complaint: Weakness  Duration:  Weeks  Timing:  Progressive and Worsening  Location: Generalized  Quality: Cramping  Severity: Moderate  Modifying Factors: None  Associated Symptoms: Abdominal pain, bilateral foot swelling, SOB, dysuria and fever. Additional History (Context): Rona Mckeon is a 80 y.o. male with diabetes and hypertension who presents to the ED with c/o generalized weakness, abdominal \"cramping\" pain and bilateral foot swelling, progressively worsening for the past 6 weeks. Reports difficulty ambulating. Associated symptoms include SOB, onset today, dysuria and fever. Denies CP or blood in stool. No improving or aggravating factors. Reports he has been compliant with his HTN medications. No other symptoms or concerns were expressed. PCP: Nighat Hewitt MD    Current Outpatient Prescriptions   Medication Sig Dispense Refill    aspirin delayed-release 81 mg tablet Take 1 Tab by mouth daily. 30 Tab 0    felodipine (PLENDIL SR) 10 mg 24 hr tablet Take 1 Tab by mouth daily. 30 Tab 1    hydroCHLOROthiazide (HYDRODIURIL) 25 mg tablet Take 1 Tab by mouth daily. 30 Tab 0    lisinopril (PRINIVIL, ZESTRIL) 10 mg tablet Take 1 Tab by mouth daily. 30 Tab 0       Past History     Past Medical History:  Past Medical History:   Diagnosis Date    Diabetes ([address]     Hypertension        Past Surgical History:  Past Surgical History:   Procedure Laterality Date    NEUROLOGICAL PROCEDURE UNLISTED         Family History:  History reviewed. No pertinent family history.     Social History:  Social History   Substance Use Topics    Smoking status: Current Every Day Smoker     Packs/day: 0.25    Smokeless tobacco: Never Used    Alcohol use No       Allergies:  No Known Allergies      Review of Systems       Review of Systems   Respiratory: Positive for shortness of breath. Cardiovascular: Positive for leg swelling (Bilateral foot swelling). Gastrointestinal: Positive for abdominal pain. Neurological: Positive for weakness. All other systems reviewed and are negative. Physical Exam     Visit Vitals    BP (!) 133/95    Pulse 73    Temp 97.8 °F (36.6 °C)    Resp 10    Ht 5' 5\" (1.651 m)    Wt 47.6 kg (105 lb)    SpO2 96%    BMI 17.47 kg/m2       Physical Exam   Constitutional: He is oriented to person, place, and time. cachetic   HENT:   Head: Normocephalic and atraumatic. Eyes: Conjunctivae and EOM are normal.   Neck: Normal range of motion. Cardiovascular: Normal heart sounds. Exam reveals no gallop and no friction rub. No murmur heard. Pulmonary/Chest: Effort normal and breath sounds normal. No stridor. Abdominal: Soft. Diffuse tenderness, mild distension   Musculoskeletal: Normal range of motion. He exhibits no tenderness. 1+ pitting edema bilaterally   Neurological: He is alert and oriented to person, place, and time. Skin: Skin is warm and dry. He is not diaphoretic. Psychiatric: He has a normal mood and affect. His behavior is normal.   Nursing note and vitals reviewed. Diagnostic Study Results     Labs -  Recent Results (from the past 12 hour(s))   CBC WITH AUTOMATED DIFF    Collection Time: 11/25/17  5:15 PM   Result Value Ref Range    WBC 5.7 4.6 - 13.2 K/uL    RBC 2.72 (L) 4.70 - 5.50 M/uL    HGB 8.9 (L) 13.0 - 16.0 g/dL    HCT 25.7 (L) 36.0 - 48.0 %    MCV 94.5 74.0 - 97.0 FL    MCH 32.7 24.0 - 34.0 PG    MCHC 34.6 31.0 - 37.0 g/dL    RDW 15.1 (H) 11.6 - 14.5 %    PLATELET 901 (L) 742 - 420 K/uL    MPV 12.6 (H) 9.2 - 11.8 FL    NEUTROPHILS 79 (H) 40 - 73 %    LYMPHOCYTES 19 (L) 21 - 52 %    MONOCYTES 2 (L) 3 - 10 %    EOSINOPHILS 0 0 - 5 %    BASOPHILS 0 0 - 2 %    ABS.  NEUTROPHILS 4.5 1.8 - 8.0 K/UL    ABS. LYMPHOCYTES 1.1 0.9 - 3.6 K/UL    ABS. MONOCYTES 0.1 0.05 - 1.2 K/UL    ABS. EOSINOPHILS 0.0 0.0 - 0.4 K/UL    ABS. BASOPHILS 0.0 0.0 - 0.1 K/UL    DF AUTOMATED     METABOLIC PANEL, COMPREHENSIVE    Collection Time: 11/25/17  5:15 PM   Result Value Ref Range    Sodium 141 136 - 145 mmol/L    Potassium 4.2 3.5 - 5.5 mmol/L    Chloride 106 100 - 108 mmol/L    CO2 24 21 - 32 mmol/L    Anion gap 11 3.0 - 18 mmol/L    Glucose 122 (H) 74 - 99 mg/dL    BUN 83 (H) 7.0 - 18 MG/DL    Creatinine 1.88 (H) 0.6 - 1.3 MG/DL    BUN/Creatinine ratio 44 (H) 12 - 20      GFR est AA 42 (L) >60 ml/min/1.73m2    GFR est non-AA 35 (L) >60 ml/min/1.73m2    Calcium 8.2 (L) 8.5 - 10.1 MG/DL    Bilirubin, total 2.1 (H) 0.2 - 1.0 MG/DL    ALT (SGPT) 93 (H) 16 - 61 U/L    AST (SGOT) 67 (H) 15 - 37 U/L    Alk.  phosphatase 816 (H) 45 - 117 U/L    Protein, total 6.1 (L) 6.4 - 8.2 g/dL    Albumin 2.7 (L) 3.4 - 5.0 g/dL    Globulin 3.4 2.0 - 4.0 g/dL    A-G Ratio 0.8 0.8 - 1.7     LIPASE    Collection Time: 11/25/17  5:15 PM   Result Value Ref Range    Lipase 297 73 - 393 U/L   CARDIAC PANEL,(CK, CKMB & TROPONIN)    Collection Time: 11/25/17  5:15 PM   Result Value Ref Range     39 - 308 U/L    CK - MB 12.9 (H) <3.6 ng/ml    CK-MB Index 9.8 (H) 0.0 - 4.0 %    Troponin-I, Qt. 0.18 (H) 0.0 - 0.045 NG/ML   NT-PRO BNP    Collection Time: 11/25/17  5:15 PM   Result Value Ref Range    NT pro-BNP 6079 (H) 0 - 1800 PG/ML   AMMONIA    Collection Time: 11/25/17  5:15 PM   Result Value Ref Range    Ammonia 12 11 - 32 UMOL/L   MAGNESIUM    Collection Time: 11/25/17  5:15 PM   Result Value Ref Range    Magnesium 2.4 1.6 - 2.6 mg/dL   PHOSPHORUS    Collection Time: 11/25/17  5:15 PM   Result Value Ref Range    Phosphorus 3.7 2.5 - 4.9 MG/DL   PROTHROMBIN TIME + INR    Collection Time: 11/25/17  5:15 PM   Result Value Ref Range    Prothrombin time 13.1 11.5 - 15.2 sec    INR 1.0 0.8 - 1.2     PTT    Collection Time: 11/25/17  5:15 PM Result Value Ref Range    aPTT 28.7 23.0 - 36.4 SEC   EKG, 12 LEAD, INITIAL    Collection Time: 11/25/17  5:42 PM   Result Value Ref Range    Ventricular Rate 76 BPM    Atrial Rate 76 BPM    P-R Interval 144 ms    QRS Duration 90 ms    Q-T Interval 388 ms    QTC Calculation (Bezet) 436 ms    Calculated P Axis 66 degrees    Calculated R Axis -28 degrees    Calculated T Axis 110 degrees    Diagnosis       Sinus rhythm with marked sinus arrhythmia with occasional premature   ventricular complexes  Nonspecific T wave abnormality  Abnormal ECG  When compared with ECG of 18-NOV-2017 11:11,  premature ventricular complexes are now present     TYPE & SCREEN    Collection Time: 11/25/17  5:45 PM   Result Value Ref Range    Crossmatch Expiration 11/28/2017     ABO/Rh(D) A POSITIVE     Antibody screen NEG    URINALYSIS W/ RFLX MICROSCOPIC    Collection Time: 11/25/17  8:13 PM   Result Value Ref Range    Color DARK YELLOW      Appearance CLEAR      Specific gravity 1.020 1.005 - 1.030      pH (UA) 5.0 5.0 - 8.0      Protein TRACE (A) NEG mg/dL    Glucose NEGATIVE  NEG mg/dL    Ketone NEGATIVE  NEG mg/dL    Bilirubin NEGATIVE  NEG      Blood NEGATIVE  NEG      Urobilinogen 1.0 0.2 - 1.0 EU/dL    Nitrites NEGATIVE  NEG      Leukocyte Esterase NEGATIVE  NEG     URINE MICROSCOPIC ONLY    Collection Time: 11/25/17  8:13 PM   Result Value Ref Range    WBC 0 to 2 0 - 4 /hpf    RBC NONE 0 - 5 /hpf    Epithelial cells FEW 0 - 5 /lpf    Bacteria FEW (A) NEG /hpf    Mucus FEW (A) NEG /lpf    Hyaline cast 4 to 8 0 - 2 /lpf       Radiologic Studies -   CT ABD PELV WO CONT   Final Result      XR CHEST PORT    (Results Pending)   CT A/P:  Similar appearance hepatic mass causing biliary duct obstruction with underlying  cirrhosis-hepatocellular carcinoma versus cholangiocarcinoma. Similar large quantity ascites fluid. New small left pleural effusion and atelectasis left lung base. Constipation.   Nonobstructive left kidney stone and hemorrhagic cyst left kidney. Left internal hernia containing fluid. CXR:        Medical Decision Making       Provider Notes (Medical Decision Making): Pt with prolonged stay recently here. Briefly, pt had several falls, eval here with CT concerning for liver laceration so was transferred to [facility]. There an MRI showed lesion is likely liver mass. Was discharged and within an hour returned to the ER here, where he lingered for several days determining disposition. He was accepted to [facility] but didn't want to go, and family would not take him so APS was activated and he eventually went home. He returned today because the home health aide could not get him out of bed/couch. Here he has diffuse ab pain and is fluid overloaded. Will need to eval liver, renal, and cardiac causes for this, and work up his failure to thrive which is likely due to his liver mass. I am the first provider for this patient. I reviewed the vital signs, available nursing notes, past medical history, past surgical history, family history and social history. Vital Signs-Reviewed the patient's vital signs. Pulse Oximetry Analysis -  96 % on room air, normal      EKG: Interpreted by the EP. Time Interpreted: 17:45   Rate: 76   Rhythm: Normal Sinus Rhythm    Interpretation: PVC's. Non-specific ST changes. Records Reviewed: Nursing Notes, Old Medical Records, Previous electrocardiograms, Previous Radiology Studies and Previous Laboratory Studies (Time of Review: 3:59 PM)     ED Course: Progress Notes, Reevaluation, and Consults:    7:27 PM Consult: I discussed care with Dr. Leanna Pederson  (hospitalist). It was a standard discussion including patient history, chief complaint, available diagnostic results, and predicted treatment course. Pt with increasing trop despite improving kidney function, elevated LFTs due to liver mass compression, and fluid overload.  Will start diuresis here and cont while inpt, echo to eval heart function, and further dispo. Pt currently stable for floor. For Hospitalized Patients:    1. Hospitalization Decision Time:  The decision to hospitalize the patient was made by Dr. Cecil Lee with Dr. Buddy Ying at [address] [place] [address] on 11/25/2017    2. Aspirin: Aspirin was not given because the patient did not present with a stroke at the time of their Emergency Department evaluation    Diagnosis     Clinical Impression:   1. Bilateral edema of lower extremity    2. Troponin I above reference range    3. Liver mass    4. Failure to thrive in adult        Disposition: Admit    Follow-up Information     None           Patient's Medications   Start Taking    No medications on file   Continue Taking    ASPIRIN DELAYED-RELEASE 81 MG TABLET    Take 1 Tab by mouth daily. FELODIPINE (PLENDIL SR) 10 MG 24 HR TABLET    Take 1 Tab by mouth daily. HYDROCHLOROTHIAZIDE (HYDRODIURIL) 25 MG TABLET    Take 1 Tab by mouth daily. LISINOPRIL (PRINIVIL, ZESTRIL) 10 MG TABLET    Take 1 Tab by mouth daily. These Medications have changed    No medications on file   Stop Taking    No medications on file     _______________________________    Attestations:  Stephanie CAMPOS Ericmaude Katalina acting as a scribe for and in the presence of Jennifer Hoang MD      November 25, 2017 at 3:59 PM       Provider Attestation:      I personally performed the services described in the documentation, reviewed the documentation, as recorded by the scribe in my presence, and it accurately and completely records my words and actions.  November 25, 2017 at 3:59 PM - Jennifer Hoang MD    _______________________________

## 2017-11-25 NOTE — ED NOTES
Pt discharged home, pt refused to sign discharge paperwork, states he does not want to go home, pt has house keys, [facility] taking [facility] home at this time, pt given paper scrubs and belongings.

## 2017-11-25 NOTE — IP AVS SNAPSHOT
[address] Patient: Dorothy Valdes MRN: [id number] :1935 My Medications ASK your physician about these medications Instructions Each Dose to Equal  
 Morning Noon Evening Bedtime  
 aspirin delayed-release 81 mg tablet Your last dose was: Your next dose is: Take 1 Tab by mouth daily. 81 mg  
    
   
   
   
  
 felodipine 10 mg 24 hr tablet Commonly known as:  PLENDIL SR Your last dose was: Your next dose is: Take 1 Tab by mouth daily. 10 mg  
    
   
   
   
  
 hydroCHLOROthiazide 25 mg tablet Commonly known as:  HYDRODIURIL Your last dose was: Your next dose is: Take 1 Tab by mouth daily. 25 mg  
    
   
   
   
  
 lisinopril 10 mg tablet Commonly known as:  Wagner Light Your last dose was: Your next dose is: Take 1 Tab by mouth daily.   
 10 mg

## 2017-11-26 PROBLEM — R16.0 LIVER MASS: Status: ACTIVE | Noted: 2017-01-01

## 2017-11-26 PROBLEM — I50.43 ACUTE ON CHRONIC COMBINED SYSTOLIC AND DIASTOLIC ACC/AHA STAGE C CONGESTIVE HEART FAILURE (HCC): Status: ACTIVE | Noted: 2017-01-01

## 2017-11-26 NOTE — ROUTINE PROCESS
TRANSFER - OUT REPORT:    Verbal report given to Xochitl CONTI(name) on [facility]  being transferred to [facility] room 467(unit) for routine progression of care       Report consisted of patients Situation, Background, Assessment and   Recommendations(SBAR). Information from the following report(s) SBAR, ED Summary, Procedure Summary and MAR was reviewed with the receiving nurse. Lines:   Peripheral IV 11/25/17 Left Antecubital (Active)   Site Assessment Clean, dry, & intact 11/25/2017  5:26 PM   Phlebitis Assessment 0 11/25/2017  5:26 PM   Infiltration Assessment 0 11/25/2017  5:26 PM   Dressing Status Clean, dry, & intact 11/25/2017  5:26 PM   Dressing Type Transparent 11/25/2017  5:26 PM   Hub Color/Line Status Pink;Flushed;Patent 11/25/2017  5:26 PM   Action Taken Blood drawn 11/25/2017  5:26 PM        Opportunity for questions and clarification was provided.       Patient transported with:   Monitor  Registered Nurse  Tech

## 2017-11-26 NOTE — H&P
History & Physical    Patient: Demian Dudley MRN: [id number]  CSN: [id number]    YOB: 1935  Age: 80 y.o. Sex: male      DOA: 11/25/2017    Chief Complaint:   Chief Complaint   Patient presents with    Extremity Weakness          HPI:     Demian Dudley is a 80 y.o. male with diabetes and hypertension who presents to the ED with c/o generalized weakness, abdominal \"cramping\" pain and bilateral foot swelling, progressively worsening for the past 6 weeks. Reports difficulty ambulating. Associated symptoms include SOB, onset before admission , dysuria and fever. Denies CP or blood in stool. No improving or aggravating factors. Reports he has been compliant with his HTN medications. No other symptoms or concerns were expressed. Pt with prolonged stay recently here. Briefly, pt had several falls, eval here with CT concerning for liver laceration so was transferred to [facility]. There an MRI showed lesion is likely liver mass. Was discharged and within an hour returned to the ER here, where he lingered for several days determining disposition. He was accepted to [facility] but didn't want to go, and family would not take him so APS was activated and he eventually went home. He returned today because the home health aide could not get him out of bed/couch.      Here he has diffuse abd pain and is fluid overloaded. Will need to eval liver, renal, and cardiac causes for this, and work up his failure to thrive which is likely due to his liver mass. Pt with increasing trop despite improving kidney function, elevated LFTs due to liver mass compression, and fluid overload. Will start diuresis here and cont while inpt, echo to eval heart function, and further dispo.       CT abdomen    Similar appearance hepatic mass causing biliary duct obstruction with underlying  cirrhosis-hepatocellular carcinoma versus cholangiocarcinoma.     Similar large quantity ascites fluid.     New small left pleural effusion and atelectasis left lung base.     Constipation.     Nonobstructive left kidney stone and hemorrhagic cyst left kidney.     Left internal hernia containing fluid.       CXR  No acute process. Past Medical History:   Diagnosis Date    Diabetes ([address]     Hypertension        Past Surgical History:   Procedure Laterality Date    NEUROLOGICAL PROCEDURE UNLISTED         History reviewed. No pertinent family history. Social History     Social History    Marital status: SINGLE     Spouse name: N/A    Number of children: N/A    Years of education: N/A     Social History Main Topics    Smoking status: Current Every Day Smoker     Packs/day: 0.25    Smokeless tobacco: Never Used    Alcohol use No    Drug use: No    Sexual activity: No     Other Topics Concern    None     Social History Narrative       Prior to Admission medications    Medication Sig Start Date End Date Taking? Authorizing Provider   aspirin delayed-release 81 mg tablet Take 1 Tab by mouth daily. 4/27/17  Yes Yanci Garcia MD   felodipine (PLENDIL SR) 10 mg 24 hr tablet Take 1 Tab by mouth daily. 4/27/17  Yes Yanci Garcia MD   hydroCHLOROthiazide (HYDRODIURIL) 25 mg tablet Take 1 Tab by mouth daily. 4/27/17   Yanci Garcia MD   lisinopril (PRINIVIL, ZESTRIL) 10 mg tablet Take 1 Tab by mouth daily. 4/27/17   Yanci Garcia MD       No Known Allergies    Review of Systems  GENERAL: Patient alert, awake and has some confusion, able to communicate full sentences and not in distress. HEENT: No change in vision, no earache, tinnitus, sore throat or sinus congestion. NECK: No pain or stiffness. CARDIOVASCULAR: No chest pain or pressure. No palpitations. PULMONARY: No shortness of breath, cough or wheeze. GASTROINTESTINAL: No abdominal pain, nausea, vomiting or diarrhea, melena or       bright red blood per rectum. GENITOURINARY: No urinary frequency, urgency, hesitancy or dysuria.  MUSCULOSKELETAL: B/L foot pain no  trauma. DERMATOLOGIC: No rash, no itching, no lesions. ENDOCRINE: No polyuria, polydipsia, no heat or cold intolerance. No recent change in    weight. HEMATOLOGICAL: H/O  anemia or easy bruising no  bleeding. NEUROLOGIC: No headache, seizures, numbness, generalized weakness   PSYCHIATRIC: No depression, anxiety, mood disorder, no loss of interest in normal       activity or change in sleep pattern. Physical Exam:     Physical Exam:  Visit Vitals    /80    Pulse 81    Temp 96.3 °F (35.7 °C)    Resp 17    Ht 5' 5\" (1.651 m)    Wt 47.6 kg (105 lb)    SpO2 100%    BMI 17.47 kg/m2      O2 Device: Room air    Temp (24hrs), Av.9 °F (36.1 °C), Min:96.2 °F (35.7 °C), Max:97.8 °F (36.6 °C)         1901 -  0700  In: -   Out: 525 [Urine:525]    General:  Alert, cooperative, no distress, appears stated age. Head:  Normocephalic, without obvious abnormality, atraumatic. Eyes:  Conjunctivae/corneas clear. PERRL, EOMs intact. Nose: Nares normal. No drainage or sinus tenderness. Throat: Lips, mucosa, and tongue normal. Poor dentition    Neck: Supple, symmetrical, trachea midline, no adenopathy, thyroid: no enlargement/tenderness/nodules, no carotid bruit and no JVD. Back:   ROM normal. No CVA tenderness. Lungs:   Clear to auscultation bilaterally. Chest wall:  No tenderness or deformity. Heart:  Regular rate and rhythm, S1, S2 normal, no murmur, click, rub or gallop. Abdomen: Soft, generalized mild tender. Bowel sounds normal. No masses,  No organomegaly. Extremities: Extremities normal, atraumatic, 2 + edema   Pulses: 2+ and symmetric all extremities. Skin: Skin color, texture, turgor normal. No rashes or lesions   Neurologic: CNII-XII intact. No focal motor or sensory deficit. Labs Reviewed: All lab results for the last 24 hours reviewed.   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) I50.43    Liver mass R16.0     Plan  Will admit start diuresis   Check echo was ordered by ER  Review old record from [facility] MRI showed liver mass  Will check hepatitis B and C sydney fetoprotein  Pt and ot evaluation and tretamnet  hydralazine prn for hypertension adjust blood pressure medication  Case manger for SNF  Pt has h/o diabetes will monitor glucose level check HG A1c  GI consult for the liver mass  Nutritional consult check iron profile C03 and folic acid   for palcement  Potassium is low will give oral potassium        DVT/GI Prophylaxis: Hep SQ and H2B/PPI      Sherry Mi MD  11/26/2017  7:09 AM

## 2017-11-26 NOTE — ROUTINE PROCESS
Bedside and Verbal shift change report given to Mitch Crockett RN (oncoming nurse) by Aroldo Dela Cruz RN (offgoing nurse). Report included the following information SBAR, Kardex, MAR and Recent Results. SITUATION:  Code Status: Prior  Reason for Admission: Heart failure [facility])  Hospital day: 1  Problem List:       Hospital Problems  Date Reviewed: 4/24/2017          Codes Class Noted POA    Heart failure ([facility] [address] ICD-10-CM: I50.9  ICD-9-CM: 428.9  11/25/2017 Unknown              BACKGROUND:   Past Medical History:   Past Medical History:   Diagnosis Date    Diabetes ([facility]     Hypertension       Patient taking anticoagulants yes    Patient has a defibrillator: no    History of shots YES for example, flu, pneumonia, tetanus   Isolation History NO for example, MRSA, CDiff    ASSESSMENT:  Changes in Assessment Throughout Shift: NONE  Significant Changes in 24 hours (for example, RR/code, fall)  Patient has Central Line: no Reasons if yes:   Patient has Christian Cath: no Reasons if yes:     Mobility Issues  PT  IV Patency  OR Checklist  Pending Tests    Last Vitals:  Vitals w/ MEWS Score (last day)     Date/Time MEWS Score Pulse Resp Temp BP Level of Consciousness SpO2    11/26/17 0514 1 81 17 96.3 °F (35.7 °C) 137/80 Alert 100 %    11/26/17 0105 -- 62 -- -- (!)  147/92 -- --    11/26/17 0032 -- 68 17 96.2 °F (35.7 °C) (!)  159/97 -- --    11/25/17 2205 -- 71 17 97.2 °F (36.2 °C) 146/83 -- 100 %    11/25/17 1945 -- 73 10 -- (!)  133/95 -- --    11/25/17 1800 -- 69 16 -- (!)  169/96 -- 96 %    11/25/17 1745 -- 68 8 -- (!)  164/96 -- --    11/25/17 1440 0 65 12 97.8 °F (36.6 °C) (!)  155/102 Alert 99 %            PAIN    Pain Assessment    Pain Intensity 1: 4 (11/26/17 0400)              Patient Stated Pain Goal: 0  Intervention effective: yes  Time of last intervention: N/A Reassessment Completed: yes   Other actions taken for pain:     Last 3 Weights:  Last 3 Recorded Weights in this Encounter    11/25/17 1440   Weight: 47.6 kg (105 lb)   Weight change:     INTAKE/OUPUT    Current Shift:      Last three shifts:      RECOMMENDATIONS AND DISCHARGE PLANNING  Patient needs and requests: NONE    Pending tests/procedures: LABS     Discharge plan for patient: TBD    Discharge planning Needs or Barriers: NONE    Estimated Discharge Date: TBD Posted on Whiteboard in Patients Room: yes       \"HEALS\" SAFETY CHECK  A safety check occurred in the patient's room between off going nurse and oncoming nurse listed above. The safety check included the below items:    H  High Alert Medications Verify all high alert medication drips (heparin, PCA, etc.)  E  Equipment Suction is set up for ALL patients (with won)  Red plugs utilized for all equipment (IV pumps, etc.)  WOWs wiped down at end of shift. Room stocked with oxygen, suction, and other unit-specific supplies  A  Alarms Bed alarm is set for fall risk patients  Ensure chair alarm is in place and activated if patient is up in a chair  L  Lines Check IV for any infiltration  Christian bag is empty if patient has a Christian   Tubing and IV bags are labeled  S  Safety  Room is clean, patient is clean, and equipment is clean. Hallways are clear from equipment besides carts. Fall bracelet on for fall risk patients  Ensure room is clear and free of clutter  Suction is set up for ALL patients (with won)  Hallways are clear from equipment besides carts.    Isolation precautions followed, supplies available outside room, sign posted    Heena Sánchez RN

## 2017-11-26 NOTE — PROGRESS NOTES
TRANSFER - IN REPORT:    Verbal report received from Carlos RN(name) on Sabina Faster  being received from ER(unit) for routine progression of care      Report consisted of patients Situation, Background, Assessment and   Recommendations(SBAR). Information from the following report(s) SBAR was reviewed with the receiving nurse. Opportunity for questions and clarification was provided. Assessment completed upon patients arrival to unit and care assumed.

## 2017-11-26 NOTE — ROUTINE PROCESS
Bedside and Verbal shift change report given to Hope Saldivar LPN (oncoming nurse) by Merrilee Councilman, RN (offgoing nurse). Report included the following information SBAR, Kardex, MAR and Recent Results. SITUATION:  Code Status: Prior  Reason for Admission: Heart failure [facility])  Hospital day: 1  Problem List:       Hospital Problems  Date Reviewed: 4/24/2017          Codes Class Noted POA    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 ([facility] ICD-10-CM: I50.43  ICD-9-CM: 428.43, 428.0  11/26/2017 Yes        Liver mass ICD-10-CM: R16.0  ICD-9-CM: 573.9  11/26/2017 Yes        Heart failure ([facility] ICD-10-CM: I50.9  ICD-9-CM: 428.9  11/25/2017 Unknown        Hypertension ICD-10-CM: I10  ICD-9-CM: 401.9  4/24/2017 Yes        CKD (chronic kidney disease) stage 3, GFR 30-59 ml/min ICD-10-CM: N18.3  ICD-9-CM: 585.3  4/24/2017 Yes              BACKGROUND:   Past Medical History:   Past Medical History:   Diagnosis Date    Diabetes ([facility]     Hypertension       Patient taking anticoagulants yes    Patient has a defibrillator: no    History of shots YES for example, flu, pneumonia, tetanus   Isolation History NO for example, MRSA, CDiff    ASSESSMENT:  Changes in Assessment Throughout Shift: NONE  Significant Changes in 24 hours (for example, RR/code, fall)  Patient has Central Line: no Reasons if yes:   Patient has Christian Cath: no Reasons if yes:     Mobility Issues  PT  IV Patency  OR Checklist  Pending Tests    Last Vitals:  Vitals w/ MEWS Score (last day)     Date/Time MEWS Score Pulse Resp Temp BP Level of Consciousness SpO2    11/26/17 1600 1 82 18 97.5 °F (36.4 °C) 123/78 Alert 100 %    11/26/17 1200 1 85 18 96.7 °F (35.9 °C) 111/79 Alert 98 %    11/26/17 0742 1 89 18 96.4 °F (35.8 °C) 137/90 Alert 100 %    11/26/17 0514 1 81 17 96.3 °F (35.7 °C) 137/80 Alert 100 %    11/26/17 0105 -- 62 -- -- (!)  147/92 -- --    11/26/17 0032 -- 68 17 96.2 °F (35.7 °C) (!)  159/97 -- -- 11/25/17 2205 -- 71 17 97.2 °F (36.2 °C) 146/83 -- 100 %    11/25/17 1945 -- 73 10 -- (!)  133/95 -- --    11/25/17 1800 -- 69 16 -- (!)  169/96 -- 96 %    11/25/17 1745 -- 68 8 -- (!)  164/96 -- --    11/25/17 1440 0 65 12 97.8 °F (36.6 °C) (!)  155/102 Alert 99 %            PAIN    Pain Assessment    Pain Intensity 1: 0 (11/26/17 1700)              Patient Stated Pain Goal: 0  Intervention effective: yes  Time of last intervention: N/A Reassessment Completed: yes   Other actions taken for pain:     Last 3 Weights:  Last 3 Recorded Weights in this Encounter    11/25/17 1440 11/26/17 1721   Weight: 47.6 kg (105 lb) 47.6 kg (104 lb 15 oz)   Weight change:     INTAKE/OUPUT    Current Shift: 11/26 0701 - 11/26 1900  In: -   Out: 400 [Urine:400]    Last three shifts: 11/24 1901 - 11/26 0700  In: -   Out: 525 [Urine:525]    RECOMMENDATIONS AND DISCHARGE PLANNING  Patient needs and requests: NONE    Pending tests/procedures: LABS     Discharge plan for patient: TBD    Discharge planning Needs or Barriers: NONE    Estimated Discharge Date: TBD Posted on Whiteboard in Patients Room: yes       \"HEALS\" SAFETY CHECK  A safety check occurred in the patient's room between off going nurse and oncoming nurse listed above. The safety check included the below items:    H  High Alert Medications Verify all high alert medication drips (heparin, PCA, etc.)  E  Equipment Suction is set up for ALL patients (with yanker)  Red plugs utilized for all equipment (IV pumps, etc.)  WOWs wiped down at end of shift. Room stocked with oxygen, suction, and other unit-specific supplies  A  Alarms Bed alarm is set for fall risk patients  Ensure chair alarm is in place and activated if patient is up in a chair  L  Lines Check IV for any infiltration  Christian bag is empty if patient has a Christian   Tubing and IV bags are labeled  S  Safety  Room is clean, patient is clean, and equipment is clean. Hallways are clear from equipment besides carts. Fall bracelet on for fall risk patients  Ensure room is clear and free of clutter  Suction is set up for ALL patients (with won)  Hallways are clear from equipment besides carts.    Isolation precautions followed, supplies available outside room, sign posted    Cristy Sahu RN

## 2017-11-27 NOTE — PROGRESS NOTES
Bedside and Verbal shift change report given to Deidre Fisher LPN (oncoming nurse) by Rakesh Hedrick LPN (offgoing nurse). Report included the following information SBAR, Kardex, Intake/Output and MAR.

## 2017-11-27 NOTE — PROGRESS NOTES
I spoke with Mr. Gregg and his family for a few minutes, and he shared that his ana was strong. I encouraged him to call me if I could be of further help.  conducted an initial consultation and Spiritual Assessment for Judit Slater, who is a 80 y.o.,male. Patients Primary Language is: [place]. According to the patients EMR Yazidism Affiliation is: [facility].     The reason the Patient came to the hospital is:   Patient Active Problem List    Diagnosis Date Noted    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 ([address] 11/26/2017    Liver mass 11/26/2017    Heart failure ([facility] 11/25/2017    Chest pain 04/26/2017    Chest pain at rest 04/24/2017    Hypertension 04/24/2017    CKD (chronic kidney disease) stage 3, GFR 30-59 ml/min 04/24/2017        The  provided the following Interventions:  Initiated a relationship of care and support. Explored issues of ana, belief, spirituality and Roman Catholic/ritual needs while hospitalized. Listened empathically. Provided chaplaincy education. Provided information about Spiritual Care Services. Offered prayer and assurance of continued prayers on patient's behalf. Chart reviewed. The following outcomes where achieved:  Patient shared limited information about both their medical narrative and spiritual journey/beliefs.  confirmed Patient's Yazidism Affiliation. Patient processed feeling about current hospitalization. Patient expressed gratitude for 's visit. Assessment:  Patient does not have any Roman Catholic/cultural needs that will affect patients preferences in health care. There are no spiritual or Roman Catholic issues which require intervention at this time. Plan:  Chaplains will continue to follow and will provide pastoral care on an as needed/requested basis.    recommends bedside caregivers page  on duty if patient shows signs of acute spiritual or emotional distress.     [address] [facility], CPE Resident   Pager: [phone]  Phone: [phone]

## 2017-11-27 NOTE — NURSE NAVIGATOR
Spoke with patient in room, he was able to verify his home address and cell# [phone] as correct on face sheet. He stated that he has no DME's at home, but would need a walker at discharge. He denies any need for Home Health. His PCP is Dr. Raul Chow and he uses [facility] as his pharmacy. His emergency contact is his sister Sheryl Home Phone# [phone], he states that his sister will provide transportation at discharge.

## 2017-11-27 NOTE — PROGRESS NOTES
NUTRITION    Nursing Referral: [facility]   Nutrition Consult: General Nutrition Management & Supplements      RECOMMENDATIONS / PLAN:     - Add supplement: Glucerna Shake BID  - Add daily MVI  - Continue RD inpatient monitoring and evaluation. NUTRITION INTERVENTIONS & DIAGNOSIS:     [x] Meals/Snacks: modified diet  [x] Medical food supplementation: add  [x] Vitamin/mineral supplementation: add daily MVI    Nutrition Diagnosis:  Inadequate oral intake related to decreased appetite as evidenced by poor meal intake PTA    ASSESSMENT:     Pt unavailable at time of visit. Has poor breakfast intake per chart. Reported poor appetite and meal intake PTA per nursing screen. Noted wt loss PTA.  Underweight; BMI 17.46 kg/m^2    Average po intake adequate to meet patients estimated nutritional needs:   [] Yes     [x] No   [] Unable to determine at this time    Diet: DIET DIABETIC CONSISTENT CARB Regular      Food Allergies:  None known   Current Appetite:   [] Good     [] Fair     [] Poor     [x] Other: unknown   Appetite/meal intake prior to admission:   [] Good     [] Fair     [x] Poor: per nursing screen     [] Other:  Feeding Limitations:  [] Swallowing difficulty    [] Chewing difficulty    [] Other:  Current Meal Intake: Patient Vitals for the past 100 hrs:   % Diet Eaten   11/27/17 0830 45 %       BM:  11/24  Skin Integrity: no pressure injury or wound noted   Edema: 2+ non-pitting LEs  Pertinent Medications: Reviewed    Recent Labs      11/27/17   0116  11/26/17   0140  11/25/17   1715   NA  141  140  141   K  4.2  3.2*  4.2   CL  110*  108  106   CO2  22  21  24   GLU  104*  114*  122*   BUN  82*  83*  83*   CREA  2.03*  1.74*  1.88*   CA  7.6*  7.9*  8.2*   MG  2.2  2.3  2.4   PHOS   --    --   3.7   ALB  2.2*  2.5*  2.7*   SGOT  96*  59*  67*   ALT  89*  78*  93*       Intake/Output Summary (Last 24 hours) at 11/27/17 1215  Last data filed at 11/27/17 0400   Gross per 24 hour   Intake                0 ml   Output 500 ml   Net             -500 ml       Anthropometrics:  Ht Readings from Last 1 Encounters:   11/25/17 5' 5\" (1.651 m)     Last 3 Recorded Weights in this Encounter    11/25/17 1440 11/26/17 1721   Weight: 47.6 kg (105 lb) 47.6 kg (104 lb 15 oz)     Body mass index is 17.46 kg/(m^2). Underweight      Weight History:  Weight loss of 8 lb (7.1%) x 7 months PTA per chart hx    Weight Metrics 11/26/2017 11/23/2017 11/18/2017 4/27/2017 8/24/2012   Weight 104 lb 15 oz 105 lb 100 lb 113 lb 140 lb   BMI 17.46 kg/m2 17.47 kg/m2 16.64 kg/m2 18.8 kg/m2 23.3 kg/m2        Admitting Diagnosis: Heart failure ([address]  Pertinent PMHx:  DM, HTN    Education Needs:        [x] None identified  [] Identified - Not appropriate at this time  []  Identified and addressed - refer to education log  Learning Limitations:   [x] None identified  [] Identified    Cultural, Judaism & ethnic food preferences:  [x] None identified    [] Identified and addressed     ESTIMATED NUTRITION NEEDS:     Calories: 2457-6765 kcal (MSJx1.2-1.4) based on  [] Actual BW      [x] IBW: 62 kg   Protein: 50-62 gm (0.8-1 gm/kg) based on  [] Actual BW      [x] IBW   Fluid: 1 mL/kcal     MONITORING & EVALUATION:     Nutrition Goal(s):   1. Po intake of meals will meet >75% of patient estimated nutritional needs within the next 7 days.   Outcome:  [] Met/Ongoing    []  Not Met    [x] New/Initial Goal     Monitoring:   [x] Diet tolerance   [x] Meal intake   [x] Supplement intake   [] GI symptoms/ability to tolerate po diet   [] Respiratory status   [] Plan of care      Previous Recommendations (for follow-up assessments only):     []   Implemented       []   Not Implemented (RD to address)      [] No Longer Appropriate     [] No Recommendation Made     Discharge Planning: diabetic diet   [x] Participated in care planning, discharge planning, & interdisciplinary rounds as appropriate      Alla Carranza, [address]   Pager: [phone]

## 2017-11-27 NOTE — PROGRESS NOTES
Progress Note    Patient: Sabina Shelton MRN: [id number]  SSN: [id number]    YOB: 1935  Age: 80 y.o. Sex: male      Admit Date: 11/25/2017    LOS: 2 days     Subjective:     Patient admitted with weight loss with severe protein calorie malnutrition found to have liver mass. Not like food but would like nutritional supplements. Objective:     Vitals:    11/27/17 0355 11/27/17 0803 11/27/17 1112 11/27/17 1220   BP: 137/78 (!) 153/91  (!) 143/97   Pulse: 71 63  71   Resp: 20 16  18   Temp: 97.2 °F (36.2 °C) 98.9 °F (37.2 °C)  97.9 °F (36.6 °C)   SpO2: 95% 98% 98% 98%   Weight:       Height:            Intake and Output:  Current Shift:    Last three shifts: 11/25 1901 - 11/27 0700  In: -   Out: 3039 [Urine:1225]    Physical Exam:   GENERAL: alert, mild distress, appears older than stated age, cachectic  LUNG: diminished breath sounds R base, L base  HEART: regular rate and rhythm, S1, S2 normal, no murmur, click, rub or gallop  ABDOMEN: abnormal findings:  mass, located in the RUQ    Lab/Data Review: All lab results for the last 24 hours reviewed. Tumor markers pending  Hep B positive    Assessment:     Active Problems:    Hypertension (4/24/2017)      CKD (chronic kidney disease) stage 3, GFR 30-59 ml/min (4/24/2017)      Heart failure ([address] (11/25/2017)      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 ([address] (11/26/2017)      Liver mass (11/26/2017)        Plan:     Gastroenterology consulted. Consider biopsy if tumor markers not diagnostic.     Signed By: Nunu Mas MD     November 27, 2017

## 2017-11-27 NOTE — DIABETES MGMT
Glycemic Control Plan of Care    POC BG range on 11/26/2017: 136-149 mg/dL. POC BG report on 11/27/2017 at time of review: 115, 181 mg/dL. Current Meal Intake:  Patient Vitals for the past 100 hrs:   % Diet Eaten   11/27/17 1246 50 %   11/27/17 0830 45 %     Recommendation(s):  1.) Change current correctional lispro insulin to HR correctional lispro insulin order set. Done. Assessment:   Patient is 80year old with past medical history including diabetes mellitus and hypertension - was admitted on 11/25/2017 with c/o generalized weakness, abdominal \"cramping\" pain, bilateral foot swelling, progressively worsening for the past 6 weeks, difficulty ambulating, shortness of breath, dysuria and fever. Noted:  Acute on chronic combined systolic and diastolic CHF. Liver mass. GI following. Diabetes mellitus with current A1c of 5.9% (11/26/2017). Most recent blood glucose values:    Results for Litzy Garcia (MRN [id number]) as of 11/27/2017 15:32   Ref. Range 11/26/2017 17:14 11/26/2017 20:51   GLUCOSE,FAST - POC Latest Ref Range: 70 - 110 mg/dL 136 (H) 149 (H)     Results for Litzy Garcia (MRN [id number]) as of 11/27/2017 15:32   Ref. Range 11/27/2017 08:07 11/27/2017 11:58   GLUCOSE,FAST - POC Latest Ref Range: 70 - 110 mg/dL 115 (H) 181 (H)     Current A1C: 5.9% (11/26/2017) is equivalent to average blood glucose of 123 mg/dL during the past 2-3 months. Current hospital diabetes medications:  Correctional lispro insulin ACHS. Normal sensitivity dose. Total daily dose insulin requirement previous day: 11/26/2017  Lispro: None. Patient did not require insulin coverage. Home diabetes medications:  None. Patient not on diabetes meds PTA. Diet: Diabetic consistent carb regular. Goals:  Blood glucose will be within target range of  mg/dL by 11/30/2017.     Education:  ___  Refer to Diabetes Education Record             _X__  Education not indicated at this time    Unruly Dontrell Colunga RN CCM

## 2017-11-27 NOTE — CONSULTS
[email or web address]  [phone]    Impression:   1. Abdominal pain  2. Liver Mass-   - CT abdomen- There is about 5 cm ill-defined mass centered on the interlobar fissure  with associated central biliary duct dilatation and nondilated common bile duct. Small amount of calcifications (21) within the lesion. Liver small with a nodular contour  3. Elevated LFTs  -ALT 89 AST 96 Bili 1.8  - acute hepatitis panel pending  4. ROXANNE  5. DM      Plan:     1. Await tumor marker results (AFP pending. Ca19-9 ordered)  2. Cont comfort care      Chief Complaint: liver mass      HPI:  Julia Pandey is a 80 y.o. male who is being seen on consult for liver mass. Pt intially presented to Er for increased general weakness and abdominal cramping. At [facility] ER MRI showed likely liver mass. Pt has diffuse abdominal pain and fluid overload in the ER. Presently Pt denies melena, hematemesis or hematochezia    PMH:   Past Medical History:   Diagnosis Date    Diabetes ([facility]     Hypertension        PSH:   Past Surgical History:   Procedure Laterality Date    NEUROLOGICAL PROCEDURE UNLISTED         Social HX:   Social History     Social History    Marital status: SINGLE     Spouse name: N/A    Number of children: N/A    Years of education: N/A     Occupational History    Not on file. Social History Main Topics    Smoking status: Current Every Day Smoker     Packs/day: 0.25    Smokeless tobacco: Never Used    Alcohol use No    Drug use: No    Sexual activity: No     Other Topics Concern    Not on file     Social History Narrative       FHX:   History reviewed. No pertinent family history. Allergy:   No Known Allergies    Home Medications:     Prescriptions Prior to Admission   Medication Sig    aspirin delayed-release 81 mg tablet Take 1 Tab by mouth daily.  felodipine (PLENDIL SR) 10 mg 24 hr tablet Take 1 Tab by mouth daily.  hydroCHLOROthiazide (HYDRODIURIL) 25 mg tablet Take 1 Tab by mouth daily.     lisinopril (PRINIVIL, ZESTRIL) 10 mg tablet Take 1 Tab by mouth daily. Review of Systems:     A fourteen point review of systems was obtained, and was negative except per HPI. Visit Vitals    BP (!) 153/91 (BP 1 Location: Right arm, BP Patient Position: At rest)    Pulse 63    Temp 98.9 °F (37.2 °C)    Resp 16    Ht 5' 5\" (1.651 m)    Wt 47.6 kg (104 lb 15 oz)    SpO2 98%    BMI 17.46 kg/m2       Physical Assessment:     constitutional: appearance: well developed, well nourished, in no acute distress. skin: no rashes, jaundice  eyes: inspection: normal conjunctivae and lids; no scleral icterus pupils: equal, round, reactive to light; extraocular movements intact  ENMT: mouth: mucous membranes moist, no thrush  neck:  no masses or tenderness; normal range of motion  respiratory: breath sounds clear, no wheeze/rales/rhonchi  cardiovascular: normal rate, regular rhythm without murmur  abdominal: soft, bowel sounds present, non-tender to palpation without rebound or guarding; no appreciable mass; no appreciable hepatosplenomegaly; no appreciable fluid wave  extremities: no clubbing, cyanosis, edema  neurologic:  Cranial nerves II-XII grossly intact; no asterixis   psychiatric:  oriented to time, space and person. Basic Metabolic Profile   Recent Labs      11/27/17 0116 11/25/17 1715   NA  141   < >  141   K  4.2   < >  4.2   CL  110*   < >  106   CO2  22   < >  24   BUN  82*   < >  83*   GLU  104*   < >  122*   CA  7.6*   < >  8.2*   MG  2.2   < >  2.4   PHOS   --    --   3.7    < > = values in this interval not displayed.          CBC w/Diff    Recent Labs      11/27/17 0116   WBC  7.1   RBC  2.10*   HGB  7.0*   HCT  20.1*   MCV  95.7   MCH  33.3   MCHC  34.8   RDW  15.3*   PLT  92*    Recent Labs      11/27/17 0116   GRANS  78*   LYMPH  17*   EOS  0        Hepatic Function   Recent Labs      11/27/17 0116 11/25/17 1715   ALB  2.2*   < >  2.7*   TP  5.3*   < >  6.1*   TBILI 1.8*   < >  2.1*   SGOT  96*   < >  67*   AP  949*   < >  816*   LPSE   --    --   297    < > = values in this interval not displayed. Charlesetta Severance, NP  [facility]  [email or web address]

## 2017-11-27 NOTE — PROGRESS NOTES
Problem: Mobility Impaired (Adult and Pediatric)  Goal: *Acute Goals and Plan of Care (Insert Text)  Physical Therapy Goals  Initiated 11/27/2017 and to be accomplished within 7 day(s)  1. Patient will move from supine to sit and sit to supine , scoot up and down and roll side to side in bed with supervision/set-up. 2.  Patient will transfer from bed to chair and chair to bed with contact guard assist using the least restrictive device. 3.  Patient will perform sit to stand with contact guard assist.  4.  Patient will ambulate with contact guard assist for >50 feet with the least restrictive device. 5.  Patient will demonstrate good seated and standing balance for improved safety during functional tasks. physical Therapy EVALUATION    Patient: Quan Falk (77 y.o. male)  Date: 11/27/2017  Primary Diagnosis: Heart failure ([facility] [address]        Precautions: Fall    ASSESSMENT :  Patient is 73yo M admitted to hospital for CHF and presents today alert and agreeable to therpay. Patient reports he lives at home and for about the past 5 weeks he was able to walk very short distances, but mostly stayed in bed. Patient was sitting in locked recliner upon arrival today and performed seated objective assessment before scooting to edge of chair and performed sit <> stand x2 with therapist on R side for first trial and in front for second. Patient required encouragement throughout session stating \"I don't know why we have to do this. Can't you wait until I get stronger? \" Educated patient on role of therapy and need to activate muscles to gain strength. Patient acknowledged understanding and was left resting in locked recliner with call bell by his side. Patient demonstrates decreased strength, mobility, and endurance and will benefit from skilled intervention to address the above impairments.   Patients rehabilitation potential is considered to be Good  Factors which may influence rehabilitation potential include:   [] None noted  [x]         Mental ability/status  [x]         Medical condition  [x]         Home/family situation and support systems  [x]         Safety awareness  [x]         Pain tolerance/management  []         Other:      PLAN :  Recommendations and Planned Interventions:  [x]           Bed Mobility Training             [x]    Neuromuscular Re-Education  [x]           Transfer Training                   []    Orthotic/Prosthetic Training  [x]           Gait Training                          []    Modalities  [x]           Therapeutic Exercises          []    Edema Management/Control  [x]           Therapeutic Activities            [x]    Patient and Family Training/Education  []           Other (comment):    Frequency/Duration: Patient will be followed by physical therapy 1-2 times per day/4-7 days per week to address goals. Discharge Recommendations: [facility] [place]  Further Equipment Recommendations for Discharge: N/A     G-CODES     Mobility  Current  CK= 40-59%   Goal  CI= 1-19%. The severity rating is based on the Level of Assistance required for Functional Mobility and ADLs.        G-CODES     Eval Complexity: History: MEDIUM  Complexity : 1-2 comorbidities / personal factors will impact the outcome/ POC Exam:LOW Complexity : 1-2 Standardized tests and measures addressing body structure, function, activity limitation and / or participation in recreation  Presentation: LOW Complexity : Stable, uncomplicated  Clinical Decision Making:Low Complexity   Overall Complexity:LOW     SUBJECTIVE:   Patient stated I can't even walk from this chair to the bed, I'm telling you.     OBJECTIVE DATA SUMMARY:     Past Medical History:   Diagnosis Date    Diabetes ([facility] [address]     Hypertension      Past Surgical History:   Procedure Laterality Date    NEUROLOGICAL PROCEDURE UNLISTED       Prior Level of Function/Home Situation: Patient lives alone in 1 story home with 3STE with HR and was independent with mobility and I/ADL's though reports he's been getting progressively weaker for about 5 weeks. Home Situation  Home Environment: Apartment  One/Two Story Residence: One story  Living Alone: Yes  Support Systems: Family member(s)  Patient Expects to be Discharged to[de-identified] Apartment  Current DME Used/Available at Home: None  Critical Behavior:    A&Ox4  Strength:    Strength: Generally decreased, functional (BLE)   Tone & Sensation:   Tone: Normal (BLE)   Sensation: Intact (BLE to LT)   Range Of Motion:  AROM: Within functional limits (BLE)   Functional Mobility:  Bed Mobility:   Scooting: Minimum assistance  Transfers:  Sit to Stand: Moderate assistance (X2 trials; PT on pt's right and then in front)  Stand to Sit: Minimum assistance      Balance:   Sitting: Intact; With support  Standing: Impaired; With support  Standing - Static: Poor  Standing - Dynamic : Poor  Ambulation/Gait Training:  Deferred 2/2 crouched standing posture with poor balance and patient refusal for further mobility. Pain:  Pt reports 0/10 pain or discomfort prior to treatment.    Pt reports 0/10 pain or discomfort post treatment. Activity Tolerance:   Patient demonstrated decreased tolerance to activity and was unable to attain full upright posture. Please refer to the flowsheet for vital signs taken during this treatment. After treatment:   [x]         Patient left in no apparent distress sitting up in chair  []         Patient left in no apparent distress in bed  [x]         Call bell left within reach  []         Nursing notified  [x]         Caregiver present  []         Bed alarm activated    COMMUNICATION/EDUCATION:   [x]         Fall prevention education was provided and the patient/caregiver indicated understanding. [x]         Patient/family have participated as able in goal setting and plan of care. [x]         Patient/family agree to work toward stated goals and plan of care.   []         Patient understands intent and goals of therapy, but is neutral about his/her participation. []         Patient is unable to participate in goal setting and plan of care.     Thank you for this referral.  Nolan Tripathi, PT   Time Calculation: 9 mins

## 2017-11-27 NOTE — PROGRESS NOTES
PT eval order received and chart reviewed, patient is on bedpan and requests more time. Will follow up as patient schedule allows. Thank you for this referral. Zahida Hampton, PT, DPT.

## 2017-11-28 NOTE — ROUTINE PROCESS
Bedside and Verbal shift change report given to SUSHILA Dash (oncoming nurse) by Akhil Steven RN (offgoing nurse). Report included the following information SBAR, Kardex, MAR and Recent Results. SITUATION:  Code Status: Full Code  Reason for Admission: Heart failure [facility])  Hospital day: 3  Problem List:       Hospital Problems  Date Reviewed: 4/24/2017          Codes Class Noted POA    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 ([facility] ICD-10-CM: I50.43  ICD-9-CM: 428.43, 428.0  11/26/2017 Yes        Liver mass ICD-10-CM: R16.0  ICD-9-CM: 573.9  11/26/2017 Yes        Heart failure ([facility] ICD-10-CM: I50.9  ICD-9-CM: 428.9  11/25/2017 Unknown        Hypertension ICD-10-CM: I10  ICD-9-CM: 401.9  4/24/2017 Yes        CKD (chronic kidney disease) stage 3, GFR 30-59 ml/min ICD-10-CM: N18.3  ICD-9-CM: 585.3  4/24/2017 Yes              BACKGROUND:   Past Medical History:   Past Medical History:   Diagnosis Date    Diabetes ([facility]     Hypertension       Patient taking anticoagulants yes    Patient has a defibrillator: no    History of shots YES for example, flu, pneumonia, tetanus   Isolation History NO for example, MRSA, CDiff    ASSESSMENT:  Changes in Assessment Throughout Shift: NONE  Significant Changes in 24 hours (for example, RR/code, fall)  Patient has Central Line: no Reasons if yes:   Patient has Christian Cath: no Reasons if yes:     Mobility Issues  PT  IV Patency  OR Checklist  Pending Tests    Last Vitals:  Vitals w/ MEWS Score (last day)     Date/Time MEWS Score Pulse Resp Temp BP Level of Consciousness SpO2    11/28/17 0630 -- 84 -- -- 121/83 -- --    11/28/17 0344 -- 76 -- -- (!)  165/100 Alert --    11/28/17 0305 1 84 20 97.5 °F (36.4 °C) (!)  165/101 Alert 100 %    11/27/17 2305 1 72 20 97.9 °F (36.6 °C) (!)  150/98 Alert 100 %    11/27/17 2046 1 80 20 98 °F (36.7 °C) (!)  147/93 Alert 100 %    11/27/17 1646 1 84 18 97.7 °F (36.5 °C) 128/89 Alert 97 %    11/27/17 1220 -- 71 18 97.9 °F (36.6 °C) (!)  143/97 -- 98 %    11/27/17 1112 -- -- -- -- -- -- 98 %    11/27/17 0803 1 63 16 98.9 °F (37.2 °C) (!)  153/91 Alert 98 %    11/27/17 0355 1 71 20 97.2 °F (36.2 °C) 137/78 Alert 95 %            PAIN    Pain Assessment    Pain Intensity 1: 0 (11/28/17 0322)         Pain Intervention(s) 1: Medication (see MAR)    Patient Stated Pain Goal: 0  Intervention effective: yes  Time of last intervention: N/A Reassessment Completed: yes   Other actions taken for pain:     Last 3 Weights:  Last 3 Recorded Weights in this Encounter    11/25/17 1440 11/26/17 1721 11/28/17 0630   Weight: 47.6 kg (105 lb) 47.6 kg (104 lb 15 oz) 50.5 kg (111 lb 4.8 oz)   Weight change: 2.885 kg (6 lb 5.8 oz)    INTAKE/OUPUT    Current Shift:      Last three shifts: 11/26 1901 - 11/28 0700  In: 240 [P.O.:240]  Out: 1250 [Urine:1250]    RECOMMENDATIONS AND DISCHARGE PLANNING  Patient needs and requests: NONE    Pending tests/procedures: LABS     Discharge plan for patient: TBD    Discharge planning Needs or Barriers: NONE    Estimated Discharge Date: TBD Posted on Whiteboard in Patients Room: yes       \"HEALS\" SAFETY CHECK  A safety check occurred in the patient's room between off going nurse and oncoming nurse listed above. The safety check included the below items:    H  High Alert Medications Verify all high alert medication drips (heparin, PCA, etc.)  E  Equipment Suction is set up for ALL patients (with yanker)  Red plugs utilized for all equipment (IV pumps, etc.)  WOWs wiped down at end of shift. Room stocked with oxygen, suction, and other unit-specific supplies  A  Alarms Bed alarm is set for fall risk patients  Ensure chair alarm is in place and activated if patient is up in a chair  L  Lines Check IV for any infiltration  Christian bag is empty if patient has a Christian   Tubing and IV bags are labeled  S  Safety  Room is clean, patient is clean, and equipment is clean.   Hallways are clear from equipment besides carts. Fall bracelet on for fall risk patients  Ensure room is clear and free of clutter  Suction is set up for ALL patients (with won)  Hallways are clear from equipment besides carts.    Isolation precautions followed, supplies available outside room, sign posted    Urszula Urbina RN

## 2017-11-28 NOTE — PROGRESS NOTES
Progress Note    Patient: Anatoliy Lara MRN: [id number]  SSN: [id number]    YOB: 1935  Age: 80 y.o. Sex: male      Admit Date: 11/25/2017    LOS: 3 days     Subjective:     Patient admitted with weight loss and anorexia. Having abdominal discomfort and elevated LFTs. Objective:     Vitals:    11/28/17 0344 11/28/17 0630 11/28/17 0851 11/28/17 1259   BP: (!) 165/100 121/83 132/56 92/53   Pulse: 76 84 90 64   Resp:   20 16   Temp:   98.2 °F (36.8 °C) 96.5 °F (35.8 °C)   SpO2:   97% 96%   Weight:  50.5 kg (111 lb 4.8 oz)     Height:            Intake and Output:  Current Shift:    Last three shifts: 11/26 1901 - 11/28 0700  In: 240 [P.O.:240]  Out: 1250 [Urine:1250]    Physical Exam:   GENERAL: alert, cooperative, no distress, appears stated age, appears older than stated age  LUNG: clear to auscultation bilaterally  HEART: regular rate and rhythm, S1, S2 normal, no murmur, click, rub or gallop  ABDOMEN: abnormal findings:  mass, located in the epigastrium    Lab/Data Review: All lab results for the last 24 hours reviewed. AFP and Ca 19-9 unremarkable    Assessment:     Active Problems:    Hypertension (4/24/2017)      CKD (chronic kidney disease) stage 3, GFR 30-59 ml/min (4/24/2017)      Heart failure ([address] (11/25/2017)      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 ([address] (11/26/2017)      Liver mass (11/26/2017)        Plan:     Interventional radiology for possible liver biopsy.     Signed By: Marcelle Crespo MD     November 28, 2017

## 2017-11-28 NOTE — INTERDISCIPLINARY ROUNDS
Interdisciplinary Round Note   Patient Information:   Joseph Casillas   167/47   Reason for Admission: Heart failure [facility])   Attending Provider:   Rochelle Urbano MD  Primary Care Physician:       Rochelle Urbano MD       [phone]   Estimated discharge date:  TBD   Hospital day: 3  [unfilled]  4d 12h  RRAT Score: High Risk            25       Total Score        3 Has Seen PCP in Last 6 Months (Yes=3, No=0)    4 IP Visits Last 12 Months (1-3=4, 4=9, >4=11)    5 Pt. Coverage (Medicare=5 , Medicaid, or Self-Pay=4)    13 Charlson Comorbidity Score (Age + Comorbid Conditions)        Criteria that do not apply:    . Living with Significant Other. Assisted Living. LTAC. SNF. or   Rehab    Patient Length of Stay (>5 days = 3)       normal sinus rhythm     No         Chemical      Lines, Drains, & Airways  Peripheral IV catheter       IV Antibiotics:    Current Antimicrobial Therapy     None        GI Prophylaxis: GI Prophylaxis:Yes   Type: Famotidine PO. Recent Glucose Results:   Lab Results   Component Value Date/Time    GLUCPOC 103 11/27/2017 09:15 PM    GLUCPOC 89 11/27/2017 03:47 PM    GLUCPOC 181 (H) 11/27/2017 11:58 AM      Activity Level:   Activity Level: Bed Rest    Needs assistance with ADLs: no       Goals for Today: Get enough rest.   Recommendations:   Discharge Disposition: Home with home health PT  P.T, O.T. and CM  Care Management involvement for home health follow up for:  mobility and assistance with ADL's    Needs for Discharge: [address] Margie Gil Team:   Recommendations from IDR team:     Other Notes:

## 2017-11-28 NOTE — DIABETES MGMT
Glycemic Control Plan of Care    POC BG range on 11/27/2017:  mg/dL. POC BG report on 11/28/2017 at time of review: 121, 178 mg/dL. Patient reported still eating only half of his meals. Current Meal Intake:  Patient Vitals for the past 100 hrs:   % Diet Eaten   11/28/17 1229 50 %   11/28/17 0851 50 %   11/27/17 1246 50 %   11/27/17 0830 45 %     Recommendation(s):  1.) Continue correctional insulin as ordered. Assessment:   Patient is 80year old with past medical history including diabetes mellitus and hypertension - was admitted on 11/25/2017 with c/o generalized weakness, abdominal \"cramping\" pain, bilateral foot swelling, progressively worsening for the past 6 weeks, difficulty ambulating, shortness of breath, dysuria and fever. Noted:  Acute on chronic combined systolic and diastolic CHF. Liver mass. GI following. Diabetes mellitus with current A1c of 5.9% (11/26/2017). Most recent blood glucose values:    Results for Stanford Ling (MRN [id number]) as of 11/28/2017 15:32   Ref. Range 11/27/2017 08:07 11/27/2017 11:58 11/27/2017 15:47 11/27/2017 21:15   GLUCOSE,FAST - POC Latest Ref Range: 70 - 110 mg/dL 115 (H) 181 (H) 89 103     Results for Stanford Ling (MRN [id number]) as of 11/28/2017 15:32   Ref. Range 11/28/2017 08:09 11/28/2017 11:57   GLUCOSE,FAST - POC Latest Ref Range: 70 - 110 mg/dL 121 (H) 178 (H)     Current A1C: 5.9% (11/26/2017) is equivalent to average blood glucose of 123 mg/dL during the past 2-3 months. Current hospital diabetes medications:  Correctional lispro insulin ACHS. Normal sensitivity dose. Total daily dose insulin requirement previous day: 11/27/2017  Lispro: 6 units    Home diabetes medications:  None. Patient not on diabetes meds PTA. Diet: Diabetic consistent carb regular; nutr suppl: glucerna shake all meals. Goals:  Blood glucose will be within target range of  mg/dL by 12/01/2017.     Education:  ___  Refer to Diabetes Education Record             _X__  Education not indicated at this time    Vonnie Dixon RN CCM

## 2017-11-28 NOTE — PROGRESS NOTES
Problem: Mobility Impaired (Adult and Pediatric)  Goal: *Acute Goals and Plan of Care (Insert Text)  Physical Therapy Goals  Initiated 11/27/2017 and to be accomplished within 7 day(s)  1. Patient will move from supine to sit and sit to supine , scoot up and down and roll side to side in bed with supervision/set-up. 2.  Patient will transfer from bed to chair and chair to bed with contact guard assist using the least restrictive device. 3.  Patient will perform sit to stand with contact guard assist.  4.  Patient will ambulate with contact guard assist for >50 feet with the least restrictive device. 5.  Patient will demonstrate good seated and standing balance for improved safety during functional tasks. physical Therapy TREATMENT    Patient: Rochelle Cazares (89 y.o. male)  Date: 11/28/2017  Diagnosis: Heart failure (HCC) <principal problem not specified>       Precautions:     Chart, physical therapy assessment, plan of care and goals were reviewed. ASSESSMENT:  Pt found supine in bed resting but was easily roused by knock on door. Pt mildly agitated and initially resistive to therapy. After encouragement, pt agreed to participate in sit to stand. Pt remained agitated throughout tx and required mod-a/min-a to perform sit-stand to RW which pt maintained for 10-15 seconds. Pt sat for short rest break and to recover SOB and then performed second transfer to RW for shorter standing period. Pt terminated tx due to fatigue and returned to supine with call bell in reach and needs met. Pt much more pleasant upon leaving room. Progression toward goals:  [x]      Improving appropriately and progressing toward goals  []      Improving slowly and progressing toward goals  []      Not making progress toward goals and plan of care will be adjusted     PLAN:   Patient continues to benefit from skilled intervention to address the above impairments. Continue treatment per established plan of care.   Discharge Recommendations:  Skilled Nursing Facility  Further Equipment Recommendations for Discharge:  N/A     SUBJECTIVE:   Patient stated I don't want to do anything man.     OBJECTIVE DATA SUMMARY:   Critical Behavior:   Functional Mobility Training:  Bed Mobility:  Scooting: Minimum assistance   Transfers:  Sit to Stand: Minimum assistance; Moderate assistance  Stand to Sit: Minimum assistance  Balance:  Sitting: Intact; With support  Standing: Impaired; With support  Standing - Static: Poor  Standing - Dynamic : Poor  Ambulation/Gait Training:  Pain:  Pain Scale 1: Numeric (0 - 10)  Pain Intensity 1: 0   Activity Tolerance:   fair  Please refer to the flowsheet for vital signs taken during this treatment.   After treatment:   [x] Patient left in no apparent distress sitting up in chair  [x] Patient left in no apparent distress in bed  [] Call bell left within reach  [x] Nursing notified  [] Caregiver present  [] Bed alarm activated      Tamara Freedman   Time Calculation: 13 mins

## 2017-11-28 NOTE — PROGRESS NOTES
[email or web address]  [phone]       Impression  1. Abdominal pain  2. Liver Mass-   - CT abdomen- There is about 5 cm ill-defined mass centered on the interlobar fissure  with associated central biliary duct dilatation and nondilated common bile duct. Small amount of calcifications (21) within the lesion. Liver small with a nodular contour  -CA-19-9,AFP WNL  3. Elevated LFTs  -ALT 89 AST 96 Bili 1.8  - acute hepatitis panel pending if positive consider treatment  4. ROXANNE  5. DM      Plan:  1. If considering intervention may need biopsy of tumor  2.  Await pending acute hepatitis panel      Chief Complaint: liver mass    Subjective:  Pt uneventful overnight        Eyes: conjunctiva normal, EOM normal   ENT: Mucous membranes moist, no lesion or thrush   Cardiovascular:  normal rate and regular rhythm, no murmur   Pulmonary/Chest Wall: breath sounds normal and effort normal   Abdominal:  soft, non-acute, non-tender, no appreciable mass or hepatosplenomegaly, no appreciable ascites       Visit Vitals    /56 (BP 1 Location: Right arm, BP Patient Position: At rest)    Pulse 90    Temp 98.2 °F (36.8 °C)    Resp 20    Ht 5' 5\" (1.651 m)    Wt 50.5 kg (111 lb 4.8 oz)    SpO2 97%    BMI 18.52 kg/m2           Intake/Output Summary (Last 24 hours) at 11/28/17 1119  Last data filed at 11/28/17 0650   Gross per 24 hour   Intake              240 ml   Output              950 ml   Net             -710 ml       CBC w/Diff    Lab Results   Component Value Date/Time    WBC 7.1 11/27/2017 01:16 AM    RBC 2.10 (L) 11/27/2017 01:16 AM    HGB 7.0 (L) 11/27/2017 01:16 AM    HCT 20.1 (L) 11/27/2017 01:16 AM    MCV 95.7 11/27/2017 01:16 AM    MCH 33.3 11/27/2017 01:16 AM    MCHC 34.8 11/27/2017 01:16 AM    RDW 15.3 (H) 11/27/2017 01:16 AM    PLT 92 (L) 11/27/2017 01:16 AM    Lab Results   Component Value Date/Time    GRANS 78 (H) 11/27/2017 01:16 AM    LYMPH 17 (L) 11/27/2017 01:16 AM    EOS 0 11/27/2017 01:16 AM    BASOS 0 11/27/2017 01:16 AM      Basic Metabolic Profile   Recent Labs      11/27/17   0116   11/25/17   1715   NA  141   < >  141   K  4.2   < >  4.2   CL  110*   < >  106   CO2  22   < >  24   BUN  82*   < >  83*   CA  7.6*   < >  8.2*   MG  2.2   < >  2.4   PHOS   --    --   3.7    < > = values in this interval not displayed. Hepatic Function    Lab Results   Component Value Date/Time    ALB 2.2 (L) 11/27/2017 01:16 AM    TP 5.3 (L) 11/27/2017 01:16 AM     (H) 11/27/2017 01:16 AM    Lab Results   Component Value Date/Time    SGOT 96 (H) 11/27/2017 01:16 AM          Jamil Oconnor NP  [facility], [facility]  [email or web address]

## 2017-11-28 NOTE — PROGRESS NOTES
NUTRITION    Nursing Referral: [facility]   Nutrition Consult: General Nutrition Management & Supplements      RECOMMENDATIONS / PLAN:     - Continue current nutrition interventions  - Continue RD inpatient monitoring and evaluation. NUTRITION INTERVENTIONS & DIAGNOSIS:     [x] Meals/Snacks: modified diet  [x] Medical food supplementation: Glucerna Shake 4x daily  [x] Vitamin/mineral supplementation:  MVI    Nutrition Diagnosis:  Inadequate oral intake related to decreased appetite as evidenced by poor meal intake PTA    Patient meets criteria for Severe Protein Calorie Malnutrition as evidenced by:   ASPEN Malnutrition Criteria  Acute Illness, Chronic Illness, or Social/Enviornmental: Chronic illness  Body Fat: Severe (triceps)  Muscle Mass: Severe (visible bones of ribs)  ASPEN Malnutrition Score - Chronic Illness: 12  Chronic Illness - Malnutrition Diagnosis: Severe malnutrition. ASSESSMENT:     11/28: Pt reported good appetite and meal intake. Tolerating diet. Consuming supplements; increased to 4 x daily per MD. Nutrition focused physical exam conducted today. 11/27: Pt unavailable at time of visit. Has poor breakfast intake per chart. Reported poor appetite and meal intake PTA per nursing screen. Noted wt loss PTA.  Underweight; BMI 17.46 kg/m^2    Average po intake adequate to meet patients estimated nutritional needs:   [] Yes     [x] No   [] Unable to determine at this time    Diet: DIET DIABETIC CONSISTENT CARB Regular  DIET NUTRITIONAL SUPPLEMENTS Dinner, All Meals; Soy Edwar 57 Allergies:  None known   Current Appetite:   [] Good     [] Fair     [] Poor     [x] Other: unknown   Appetite/meal intake prior to admission:   [] Good     [] Fair     [x] Poor: per nursing screen     [] Other:  Feeding Limitations:  [] Swallowing difficulty    [] Chewing difficulty    [] Other:  Current Meal Intake:   Patient Vitals for the past 100 hrs:   % Diet Eaten   11/28/17 1229 50 %   11/28/17 0851 50 % 11/27/17 1246 50 %   11/27/17 0830 45 %       BM:  11/28 x 3 - soft per pt report  Skin Integrity: no pressure injury or wound noted   Edema: 2+ non-pitting LEs  Pertinent Medications: Reviewed    Recent Labs      11/27/17   0116  11/26/17   0140  11/25/17   1715   NA  141  140  141   K  4.2  3.2*  4.2   CL  110*  108  106   CO2  22  21  24   GLU  104*  114*  122*   BUN  82*  83*  83*   CREA  2.03*  1.74*  1.88*   CA  7.6*  7.9*  8.2*   MG  2.2  2.3  2.4   PHOS   --    --   3.7   ALB  2.2*  2.5*  2.7*   SGOT  96*  59*  67*   ALT  89*  78*  93*       Intake/Output Summary (Last 24 hours) at 11/28/17 1540  Last data filed at 11/28/17 1514   Gross per 24 hour   Intake              240 ml   Output             1950 ml   Net            -1710 ml       Anthropometrics:  Ht Readings from Last 1 Encounters:   11/25/17 5' 5\" (1.651 m)     Last 3 Recorded Weights in this Encounter    11/25/17 1440 11/26/17 1721 11/28/17 0630   Weight: 47.6 kg (105 lb) 47.6 kg (104 lb 15 oz) 50.5 kg (111 lb 4.8 oz)     Body mass index is 18.52 kg/(m^2). Underweight      Weight History:  Weight loss of 8 lb (7.1%) x 7 months PTA per chart hx. Pt reported usual body weight of 200 lb several months ago PTA.      Weight Metrics 11/28/2017 11/23/2017 11/18/2017 4/27/2017 8/24/2012   Weight 111 lb 4.8 oz 105 lb 100 lb 113 lb 140 lb   BMI 18.52 kg/m2 17.47 kg/m2 16.64 kg/m2 18.8 kg/m2 23.3 kg/m2        Admitting Diagnosis: Heart failure ([facility] [address]  Pertinent PMHx:  DM, HTN    Education Needs:        [x] None identified  [] Identified - Not appropriate at this time  []  Identified and addressed - refer to education log  Learning Limitations:   [x] None identified  [] Identified    Cultural, Anabaptist & ethnic food preferences:  [x] None identified    [] Identified and addressed     ESTIMATED NUTRITION NEEDS:     Calories: 7793-6409 kcal (MSJx1.2-1.4) based on  [] Actual BW      [x] IBW: 62 kg   Protein: 50-62 gm (0.8-1 gm/kg) based on  [] Actual BW [x] IBW   Fluid: 1 mL/kcal     MONITORING & EVALUATION:     Nutrition Goal(s):   1. Po intake of meals will meet >75% of patient estimated nutritional needs within the next 7 days.   Outcome:  [x] Met/Ongoing    []  Not Met    [] New/Initial Goal     Monitoring:   [x] Diet tolerance   [x] Meal intake   [x] Supplement intake   [] GI symptoms/ability to tolerate po diet   [] Respiratory status   [] Plan of care      Previous Recommendations (for follow-up assessments only):     [x]   Implemented       []   Not Implemented (RD to address)      [] No Longer Appropriate     [] No Recommendation Made     Discharge Planning: diabetic diet   [x] Participated in care planning, discharge planning, & interdisciplinary rounds as appropriate      Arabella Zuleta, [address] [place] [address]   Pager: [phone]
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
## 2017-11-28 NOTE — PROGRESS NOTES
Problem: Falls - Risk of  Goal: *Absence of Falls  Document Isaac Fall Risk and appropriate interventions in the flowsheet.    Outcome: Progressing Towards Goal  Fall Risk Interventions:            Medication Interventions: Bed/chair exit alarm    Elimination Interventions: Call light in reach    History of Falls Interventions: Bed/chair exit alarm

## 2017-11-28 NOTE — PROGRESS NOTES
Problem: Falls - Risk of  Goal: *Absence of Falls  Document Isaac Fall Risk and appropriate interventions in the flowsheet.    Outcome: Progressing Towards Goal  Fall Risk Interventions:            Medication Interventions: Bed/chair exit alarm    Elimination Interventions: Call light in reach, Urinal in reach    History of Falls Interventions: Bed/chair exit alarm, Room close to nurse's station, Door open when patient unattended
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
## 2017-11-29 NOTE — PROGRESS NOTES
Problem: Self Care Deficits Care Plan (Adult)  Goal: *Acute Goals and Plan of Care (Insert Text)  Occupational Therapy Goals  Initiated 11/29/2017 within 7 day(s). 1.  Patient will perform lower body dressing with supervision/set-up. 2.  Patient will perform functional task in standing for 3 minutes with supervision for balance. 3.  Patient will perform toilet transfers with supervision/set-up. 4.  Patient will participate in upper extremity therapeutic exercise/activities with supervision/set-up for 8 minutes to increase strength/endurance for ADLs. Outcome: Progressing Towards Goal  Occupational Therapy EVALUATION    Patient: Danyelle Limon (34 y.o. male)  Date: 11/29/2017  Primary Diagnosis: Heart failure ([facility] [address]        Precautions:   Fall    ASSESSMENT :  Based on the objective data described below, the patient presents with decreased ADLs, decreased functional mobility and muscle weakness vs his PLOF. Patient fatigues easily and states he feels \"weak. \"  Mod assist given for functional standing but patient able to reach his feet while seated for self care tasks. Recommend continued therapy at SNF after discharge to increase his independence for safe return home. Patient will benefit from skilled intervention to address the above impairments.   Patients rehabilitation potential is considered to be Good  Factors which may influence rehabilitation potential include:   []             None noted  []             Mental ability/status  [x]             Medical condition  []             Home/family situation and support systems  []             Safety awareness  []             Pain tolerance/management  []             Other:      PLAN :  Recommendations and Planned Interventions:  [x]               Self Care Training                  [x]        Therapeutic Activities  [x]               Functional Mobility Training    []        Cognitive Retraining  [x]               Therapeutic Exercises           [x] Endurance Activities  [x]               Balance Training                   []        Neuromuscular Re-Education  []               Visual/Perceptual Training     [x]   Home Safety Training  [x]               Patient Education                 [x]        Family Training/Education  []               Other (comment):    Frequency/Duration: Patient will be followed by occupational therapy 1-2 times per day/4-7 days per week to address goals. Discharge Recommendations: [facility] [place]  Further Equipment Recommendations for Discharge: TBD at next level of care     Barriers to Learning/Limitations: None  Compensate with: visual, verbal, tactile, kinesthetic cues/model     PATIENT COMPLEXITY      Eval Complexity: History: LOW Complexity : Brief history review ; Examination: LOW Complexity : 1-3 performance deficits relating to physical, cognitive , or psychosocial skils that result in activity limitations and / or participation restrictions ; Decision Making:LOW Complexity : No comorbidities that affect functional and no verbal or physical assistance needed to complete eval tasks  Assessment: Low Complexity     G-CODES:     Self Care  Current  CK= 40-59%   Goal  CJ= 20-39%. The severity rating is based on the Level of Assistance required for Functional Mobility and ADLs. SUBJECTIVE:   Patient stated I feel so weak.     OBJECTIVE DATA SUMMARY:     Past Medical History:   Diagnosis Date    Diabetes ([facility] [address]     Hypertension      Past Surgical History:   Procedure Laterality Date    NEUROLOGICAL PROCEDURE UNLISTED       Prior Level of Function/Home Situation: Per patient, he was independent with basic self care tasks and functional mobility PTA.   Home Situation  Home Environment: Apartment  One/Two Story Residence: One story  Living Alone: Yes  Support Systems: Family member(s)  Patient Expects to be Discharged to[de-identified] Apartment  Current DME Used/Available at Home: None  Tub or Shower Type: Tub/Shower combination (with grab bar)  [x]  Right hand dominant   []  Left hand dominant  Cognitive/Behavioral Status:  Neurologic State: Alert  Orientation Level: Oriented X4  Cognition: Appropriate decision making; Follows commands  Safety/Judgement: Awareness of environment; Fall prevention    Skin: Intact on UEs    Edema: None noted in UEs    Vision/Perceptual:    Acuity: Within Defined Limits      Coordination:  Fine Motor Skills-Upper: Left Intact; Right Intact    Gross Motor Skills-Upper: Left Intact; Right Intact    Balance:  Sitting: Intact  Standing: Impaired    Strength:  Strength: Generally decreased, functional (UEs; 4/5 throughout )    Tone & Sensation:  Tone: Normal (UEs)  Sensation: Intact (UEs)    Range of Motion:  AROM: Within functional limits (UEs)    Functional Mobility and Transfers for ADLs:  Bed Mobility:  Supine to Sit: Minimum assistance  Sit to Supine: Minimum assistance  Transfers:  Sit to Stand: Moderate assistance   Toilet Transfer : Moderate assistance    ADL Assessment:  Feeding: Setup    Oral Facial Hygiene/Grooming: Setup    Bathing: Moderate assistance    Upper Body Dressing: Setup    Lower Body Dressing: Moderate assistance    Toileting: Minimum assistance  Pain:  Pt reports 0/10 pain or discomfort prior to treatment.    Pt reports 0/10 pain or discomfort post treatment. Activity Tolerance:   Good    Please refer to the flowsheet for vital signs taken during this treatment. After treatment:   [] Patient left in no apparent distress sitting up in chair  [x] Patient left in no apparent distress in bed  [x] Call bell left within reach  [] Nursing notified  [] Caregiver present  [] Bed alarm activated    COMMUNICATION/EDUCATION:   [x] Home safety education was provided and the patient/caregiver indicated understanding. [x] Patient/family have participated as able in goal setting and plan of care. [x] Patient/family agree to work toward stated goals and plan of care.   [] Patient understands intent and goals of therapy, but is neutral about his/her participation. [] Patient is unable to participate in goal setting and plan of care.     Thank you for this referral.  Ja Stacy MS OTR/L  Time Calculation: 15 mins

## 2017-11-29 NOTE — DIABETES MGMT
Glycemic Control Plan of Care    POC BG range on 11/28/2017: 120-216 mg/dL. POC BG report on 11/29/2017 at time of review: 118, 117 mg/dL. Patient reported that he's not eating much because he is not hungry. Current Meal Intake:  Patient Vitals for the past 100 hrs:   % Diet Eaten   11/28/17 1744 50 %   11/28/17 1229 50 %   11/28/17 0851 50 %   11/27/17 1246 50 %   11/27/17 0830 45 %     Recommendation(s):  1.) Continue correctional insulin as ordered. Assessment:   Patient is 80year old with past medical history including diabetes mellitus and hypertension - was admitted on 11/25/2017 with c/o generalized weakness, abdominal \"cramping\" pain, bilateral foot swelling, progressively worsening for the past 6 weeks, difficulty ambulating, shortness of breath, dysuria and fever. Noted:  Acute on chronic combined systolic and diastolic CHF. Liver mass. GI following. Status post US guided paracentesis, 11/29/2017. Diabetes mellitus with current A1c of 5.9% (11/26/2017). Most recent blood glucose values:    Results for Nancy Oakes (MRN [id number]) as of 11/29/2017 16:07   Ref. Range 11/28/2017 08:09 11/28/2017 11:57 11/28/2017 16:00 11/28/2017 22:23   GLUCOSE,FAST - POC Latest Ref Range: 70 - 110 mg/dL 121 (H) 178 (H) 120 (H) 216 (H)     Results for Nancy Oakes (MRN [id number]) as of 11/29/2017 16:07   Ref. Range 11/29/2017 08:34 11/29/2017 13:08   GLUCOSE,FAST - POC Latest Ref Range: 70 - 110 mg/dL 118 (H) 117 (H)     Current A1C: 5.9% (11/26/2017) is equivalent to average blood glucose of 123 mg/dL during the past 2-3 months. Current hospital diabetes medications:  Correctional lispro insulin ACHS. Normal sensitivity dose. Total daily dose insulin requirement previous day: 11/28/2017  Lispro: 6 units    Home diabetes medications:  None. Patient not on diabetes meds PTA. Diet: Diabetic consistent carb regular; nutr suppl: glucerna shake all meals.     Goals:  Blood glucose will be within target range of  mg/dL by 12/02/2017.     Education:  ___  Refer to Diabetes Education Record             _X__  Education not indicated at this time    Franck Deng RN CCM

## 2017-11-29 NOTE — ROUTINE PROCESS
Bedside and Verbal shift change report given to SUSHILA Love (oncoming nurse) by Megha Lawrence RN (offgoing nurse). Report included the following information SBAR, Kardex, MAR and Recent Results. SITUATION:  Code Status: Full Code  Reason for Admission: Heart failure 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 ([facility] ICD-10-CM: I50.43  ICD-9-CM: 428.43, 428.0  11/26/2017 Yes        Liver mass ICD-10-CM: R16.0  ICD-9-CM: 573.9  11/26/2017 Yes        Heart failure ([facility] ICD-10-CM: I50.9  ICD-9-CM: 428.9  11/25/2017 Unknown        Hypertension ICD-10-CM: I10  ICD-9-CM: 401.9  4/24/2017 Yes        CKD (chronic kidney disease) stage 3, GFR 30-59 ml/min ICD-10-CM: N18.3  ICD-9-CM: 585.3  4/24/2017 Yes              BACKGROUND:   Past Medical History:   Past Medical History:   Diagnosis Date    Diabetes ([facility]     Hypertension       Patient taking anticoagulants yes    Patient has a defibrillator: no    History of shots YES for example, flu, pneumonia, tetanus   Isolation History NO for example, MRSA, CDiff    ASSESSMENT:  Changes in Assessment Throughout Shift: NONE  Significant Changes in 24 hours (for example, RR/code, fall)  Patient has Central Line: no Reasons if yes:   Patient has Christian Cath: no Reasons if yes:     Mobility Issues  PT  IV Patency  OR Checklist  Pending Tests    Last Vitals:  Vitals w/ MEWS Score (last day)     Date/Time MEWS Score Pulse Resp Temp BP Level of Consciousness SpO2    11/28/17 1259 -- 64 16 96.5 °F (35.8 °C) 92/53 -- 96 %    11/28/17 0851 -- 90 20 98.2 °F (36.8 °C) 132/56 -- 97 %    11/28/17 0630 -- 84 -- -- 121/83 -- --    11/28/17 0344 -- 76 -- -- (!)  165/100 Alert --    11/28/17 0305 1 84 20 97.5 °F (36.4 °C) (!)  165/101 Alert 100 %    11/27/17 2305 1 72 20 97.9 °F (36.6 °C) (!)  150/98 Alert 100 %    11/27/17 2046 1 80 20 98 °F (36.7 °C) (!)  147/93 Alert 100 %    11/27/17 1646 1 84 18 97.7 °F (36.5 °C) 128/89 Alert 97 %    11/27/17 1220 -- 71 18 97.9 °F (36.6 °C) (!)  143/97 -- 98 %    11/27/17 1112 -- -- -- -- -- -- 98 %    11/27/17 0803 1 63 16 98.9 °F (37.2 °C) (!)  153/91 Alert 98 %    11/27/17 0355 1 71 20 97.2 °F (36.2 °C) 137/78 Alert 95 %            PAIN    Pain Assessment    Pain Intensity 1: 0 (11/28/17 0800)         Pain Intervention(s) 1: Medication (see MAR)    Patient Stated Pain Goal: 0  Intervention effective: yes  Time of last intervention: N/A Reassessment Completed: yes   Other actions taken for pain:     Last 3 Weights:  Last 3 Recorded Weights in this Encounter    11/25/17 1440 11/26/17 1721 11/28/17 0630   Weight: 47.6 kg (105 lb) 47.6 kg (104 lb 15 oz) 50.5 kg (111 lb 4.8 oz)   Weight change: 2.885 kg (6 lb 5.8 oz)    INTAKE/OUPUT    Current Shift:      Last three shifts: 11/27 0701 - 11/28 1900  In: 840 [P.O.:840]  Out: 2150 [Urine:2150]    RECOMMENDATIONS AND DISCHARGE PLANNING  Patient needs and requests: NONE    Pending tests/procedures: LABS     Discharge plan for patient: TBD    Discharge planning Needs or Barriers: NONE    Estimated Discharge Date: TBD Posted on Whiteboard in Patients Room: yes       \"HEALS\" SAFETY CHECK  A safety check occurred in the patient's room between off going nurse and oncoming nurse listed above. The safety check included the below items:    H  High Alert Medications Verify all high alert medication drips (heparin, PCA, etc.)  E  Equipment Suction is set up for ALL patients (with yanker)  Red plugs utilized for all equipment (IV pumps, etc.)  WOWs wiped down at end of shift.   Room stocked with oxygen, suction, and other unit-specific supplies  A  Alarms Bed alarm is set for fall risk patients  Ensure chair alarm is in place and activated if patient is up in a chair  L  Lines Check IV for any infiltration  Christian bag is empty if patient has a Christian   Tubing and IV bags are labeled  S  Safety  Room is clean, patient is clean, and equipment is clean. Hallways are clear from equipment besides carts. Fall bracelet on for fall risk patients  Ensure room is clear and free of clutter  Suction is set up for ALL patients (with won)  Hallways are clear from equipment besides carts.    Isolation precautions followed, supplies available outside room, sign posted    Maribel Gavin RN

## 2017-11-29 NOTE — PROCEDURES
RADIOLOGY POST PARACENTESIS NOTE     November 29, 2017       12:41 PM     Preoperative Diagnosis:   Ascites    Postoperative Diagnosis:  Same. :  Hansa Howard PA-C    Assistant:  None. Type of Anesthesia: 1% plain lidocaine    Procedure/Description:  US-Guided paracentesis    Findings:  Using ultrasound guidance the largest pocket of peritoneal fluid was localized and marked at the right upper quadrant. The patient was prepped and draped in the usual fashion. 1% Lidocaine was infiltrated locally. A 5 Uzbek over the needle catheter was advanced into the peritoneal cavity and bloody colored fluid was aspirated. Once fluid was easily aspirated, the needle was removed leaving the catheter in place. The catheter was connected to vacuum containers and 1.2 liters of ascitic fluid was removed.     Estimated blood Loss:  Minimal    Specimen Removed:   Yes    Complications: None    Condition: Stable    Discharge Plan:  continue present therapy    Chanelle Madrid

## 2017-11-29 NOTE — PROGRESS NOTES
Problem: Mobility Impaired (Adult and Pediatric)  Goal: *Acute Goals and Plan of Care (Insert Text)  Physical Therapy Goals  Initiated 11/27/2017 and to be accomplished within 7 day(s)  1. Patient will move from supine to sit and sit to supine , scoot up and down and roll side to side in bed with supervision/set-up. 2.  Patient will transfer from bed to chair and chair to bed with contact guard assist using the least restrictive device. 3.  Patient will perform sit to stand with contact guard assist.  4.  Patient will ambulate with contact guard assist for >50 feet with the least restrictive device. 5.  Patient will demonstrate good seated and standing balance for improved safety during functional tasks. Outcome: Progressing Towards Goal  physical Therapy TREATMENT    Patient: Danyelle Limon (26 y.o. male)  Date: 11/29/2017  Diagnosis: Heart failure (HCC) <principal problem not specified>  Precautions: Fall   Chart, physical therapy assessment, plan of care and goals were reviewed. ASSESSMENT:  Patient with limited participation today with PT. Patient declined to get OOB today, only agreeable to repositioning in bed. Patient instructed with rolling for repositioning, verbal/tactile cues provided for use of HR. Patient does require increased assistance with scooting up in bed, verbal/tactile cues provided for hand placement and for patient's use of B LE for effective push off in bed. Patient remained in supine position at conclusion of session. Will continue to progress patient as tolerated. Progression toward goals:  []      Improving appropriately and progressing toward goals  [x]      Improving slowly and progressing toward goals  []      Not making progress toward goals and plan of care will be adjusted     PLAN:  Patient continues to benefit from skilled intervention to address the above impairments. Continue treatment per established plan of care.   Discharge Recommendations:  Skilled Nursing Facility  Further Equipment Recommendations for Discharge:  rolling walker     SUBJECTIVE:   Patient stated I am not getting out of this bed today.     OBJECTIVE DATA SUMMARY:   Critical Behavior:  Neurologic State: Alert  Orientation Level: Oriented X4  Cognition: Follows commands  Safety/Judgement: Fall prevention  Functional Mobility Training:  Bed Mobility:  Rolling: Contact guard assistance  Supine to Sit:  (N/A-pt refused )  Sit to Supine: Minimum assistance  Scooting: Moderate assistance; Additional time   Interventions: Verbal cues; Visual cues; Tactile cues;Manual cues  Transfers:  Sit to Stand:  (N/A-pt refused)  Stand to Sit: Minimum assistance  Balance:  Sitting:  (N/A)  Standing:  (N/A)  Pain:  Pain Scale 1: Numeric (0 - 10)  Pain Intensity 1: 1  Activity Tolerance:   Fair  Please refer to the flowsheet for vital signs taken during this treatment. After treatment:   [] Patient left in no apparent distress sitting up in chair  [x] Patient left in no apparent distress in bed  [x] Call bell left within reach  [x] Nursing notified  [] Caregiver present  [] Bed alarm activated      Louie Salmeron PT   Time Calculation: 11 mins     G-codes:  Mobility  Current  CK= 40-59%   Goal  CI= 1-19%. The severity rating is based on the Level of Assistance required for Functional Mobility and ADLs.

## 2017-11-29 NOTE — ROUTINE PROCESS
Bedside and Verbal shift change report given to Jeffry Morrison RN (oncoming nurse) by Deepika Mccarthy RN (offgoing nurse). Report included the following information SBAR, Kardex, MAR and Recent Results.     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 [facility]) ICD-10-CM: I50.43  ICD-9-CM: 428.43, 428.0  11/26/2017 Yes        Liver mass ICD-10-CM: R16.0  ICD-9-CM: 573.9  11/26/2017 Yes        Heart failure ([facility] ICD-10-CM: I50.9  ICD-9-CM: 428.9  11/25/2017 Unknown        Hypertension ICD-10-CM: I10  ICD-9-CM: 401.9  4/24/2017 Yes        CKD (chronic kidney disease) stage 3, GFR 30-59 ml/min ICD-10-CM: N18.3  ICD-9-CM: 585.3  4/24/2017 Yes              BACKGROUND:   Past Medical History:   Past Medical History:   Diagnosis Date    Diabetes ([facility]     Hypertension       Patient taking anticoagulants no    Patient has a defibrillator: yes    History of shots YES for example, flu, pneumonia, tetanus   Isolation History NO for example, MRSA, CDiff    ASSESSMENT:  Changes in Assessment Throughout Shift: None  Significant Changes in 24 hours (for example, RR/code, fall)  Patient has Central Line: no Reasons if yes: N/A  Patient has Christian Cath: no Reasons if yes: N/A   Mobility Issues  PT  IV Patency  OR Checklist  Pending Tests    Last Vitals:  Vitals w/ MEWS Score (last day)     Date/Time MEWS Score Pulse Resp Temp BP Level of Consciousness SpO2    11/29/17 0418 1 94 20 97.1 °F (36.2 °C) 133/89 Alert 98 %    11/29/17 0000 1 68 20 97.3 °F (36.3 °C) (!)  146/98 Alert 97 %    11/28/17 2004 2 (!)  101 20 98.1 °F (36.7 °C) (!)  130/92 Alert 100 %    11/28/17 1259 -- 64 16 96.5 °F (35.8 °C) 92/53 -- 96 %    11/28/17 0851 -- 90 20 98.2 °F (36.8 °C) 132/56 -- 97 %    11/28/17 0630 -- 84 -- -- 121/83 -- --    11/28/17 0344 -- 76 -- -- (!)  165/100 Alert --    11/28/17 0305 1 84 20 97.5 °F (36.4 °C) (!)  165/101 Alert 100 %            PAIN    Pain Assessment    Pain Intensity 1: 0 (11/29/17 0418)         Pain Intervention(s) 1: Medication (see MAR)    Patient Stated Pain Goal: 0  Intervention effective: N/A  Time of last intervention: N/A Reassessment Completed: N/A   Other actions taken for pain: N/A    Last 3 Weights:  Last 3 Recorded Weights in this Encounter    11/25/17 1440 11/26/17 1721 11/28/17 0630   Weight: 47.6 kg (105 lb) 47.6 kg (104 lb 15 oz) 50.5 kg (111 lb 4.8 oz)   Weight change:     INTAKE/OUPUT    Current Shift: 11/28 1901 - 11/29 0700  In: -   Out: 400 [Urine:400]    Last three shifts: 11/27 0701 - 11/28 1900  In: 840 [P.O.:840]  Out: 2150 [Urine:2150]    RECOMMENDATIONS AND DISCHARGE PLANNING  Patient needs and requests: None    Pending tests/procedures: Liver biopsy     Discharge plan for patient: SNF    Discharge planning Needs or Barriers: TBD    Estimated Discharge Date: TBD Posted on Whiteboard in Patients Room: no       \"HEALS\" SAFETY CHECK  A safety check occurred in the patient's room between off going nurse and oncoming nurse listed above. The safety check included the below items:    H  High Alert Medications Verify all high alert medication drips (heparin, PCA, etc.)  E  Equipment Suction is set up for ALL patients (with yanker)  Red plugs utilized for all equipment (IV pumps, etc.)  WOWs wiped down at end of shift. Room stocked with oxygen, suction, and other unit-specific supplies  A  Alarms Bed alarm is set for fall risk patients  Ensure chair alarm is in place and activated if patient is up in a chair  L  Lines Check IV for any infiltration  Christian bag is empty if patient has a Christian   Tubing and IV bags are labeled  S  Safety  Room is clean, patient is clean, and equipment is clean. Hallways are clear from equipment besides carts.    Fall bracelet on for fall risk patients  Ensure room is clear and free of clutter  Suction is set up for ALL patients (with won)  Hallways are clear from equipment besides carts.    Isolation precautions followed, supplies available outside room, sign posted    Demetria Stpehen RN

## 2017-11-29 NOTE — PROGRESS NOTES
0827: Shift assessment performed and patient given medications. Patient taught importance of checking blood sugar and general hypoglycemic signs and symptoms. Patient verbalized understanding. 1028: Patient resting in bed. No needs verbalized  1111: Patient taken by transport to ultrasound   1310: Provider Christin notified patient platelets are 92. Gave verbal order to discontinue Heparin and order sequential stockings for DVT prophylaxis   1330: Patient resting in bed. No needs verbalized  1738: Patient given medication for complaint of headache 1/10.  Patient sitting up in bed eating dinner, no other needs verbalzied  Report given to [facility]

## 2017-11-29 NOTE — PROGRESS NOTES
Progress Note    Patient: Emeli Cheney MRN: [id number]  SSN: [id number]    YOB: 1935  Age: 80 y.o. Sex: male      Admit Date: 11/25/2017    LOS: 4 days     Subjective:     Patient admitted with FTT and anorexia with abdominal pain and finding of liver mass. Patient underwent paracentesis today. Objective:     Vitals:    11/29/17 0000 11/29/17 0418 11/29/17 0838 11/29/17 1310   BP: (!) 146/98 133/89 (!) 140/96 (!) 153/97   Pulse: 68 94 (!) 102 89   Resp: 20 20 20 18   Temp: 97.3 °F (36.3 °C) 97.1 °F (36.2 °C) 96.8 °F (36 °C) 97.7 °F (36.5 °C)   SpO2: 97% 98% 100% 98%   Weight:       Height:            Intake and Output:  Current Shift:    Last three shifts: 11/27 1901 - 11/29 0700  In: 840 [P.O.:840]  Out: 2550 [Urine:2550]    Physical Exam:   GENERAL: alert, cooperative, mild distress, slowed mentation, appears older than stated age  LUNG: clear to auscultation bilaterally  HEART: regular rate and rhythm, S1, S2 normal, no murmur, click, rub or gallop  ABDOMEN: abnormal findings:  tenderness mild in the epigastrium    Lab/Data Review: All lab results for the last 24 hours reviewed. 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     Signed By: Derik Galindo MD     November 29, 2017

## 2017-11-29 NOTE — PROGRESS NOTES
[email or web address]  [phone]       Impression  1. Abdominal pain  2. Liver Mass-   - CT abdomen- There is about 5 cm ill-defined mass centered on the interlobar fissure  with associated central biliary duct dilatation and nondilated common bile duct. Small amount of calcifications (21) within the lesion. Liver small with a nodular contour  -CA-19-9,AFP WNL  3. Elevated LFTs  - Hepatitis B positive, consider treatment  4. ROXANNE  5. DM      Plan:  1.  IR for possible liver biopsy        Chief Complaint: liver mass    Subjective:  Pt uneventful overnight        Eyes: conjunctiva normal, EOM normal   ENT: Mucous membranes moist, no lesion or thrush   Cardiovascular:  normal rate and regular rhythm, no murmur   Pulmonary/Chest Wall: breath sounds normal and effort normal   Abdominal:  soft, non-acute, non-tender, no appreciable mass or hepatosplenomegaly, no appreciable ascites       Visit Vitals    BP (!) 140/96 (BP 1 Location: Right arm, BP Patient Position: Head of bed elevated (Comment degrees))    Pulse (!) 102    Temp 96.8 °F (36 °C)    Resp 20    Ht 5' 5\" (1.651 m)    Wt 50.5 kg (111 lb 4.8 oz)    SpO2 100%    BMI 18.52 kg/m2           Intake/Output Summary (Last 24 hours) at 11/29/17 1133  Last data filed at 11/29/17 0254   Gross per 24 hour   Intake              480 ml   Output             1400 ml   Net             -920 ml       CBC w/Diff    Lab Results   Component Value Date/Time    WBC 7.1 11/27/2017 01:16 AM    RBC 2.10 (L) 11/27/2017 01:16 AM    HGB 7.0 (L) 11/27/2017 01:16 AM    HCT 20.1 (L) 11/27/2017 01:16 AM    MCV 95.7 11/27/2017 01:16 AM    MCH 33.3 11/27/2017 01:16 AM    MCHC 34.8 11/27/2017 01:16 AM    RDW 15.3 (H) 11/27/2017 01:16 AM    PLT 92 (L) 11/27/2017 01:16 AM    Lab Results   Component Value Date/Time    GRANS 78 (H) 11/27/2017 01:16 AM    LYMPH 17 (L) 11/27/2017 01:16 AM    EOS 0 11/27/2017 01:16 AM    BASOS 0 11/27/2017 01:16 AM      Basic Metabolic Profile   Recent Labs 11/29/17   0932  11/27/17   0116   NA  141  141   K  4.0  4.2   CL  109*  110*   CO2  23  22   BUN  74*  82*   CA  8.0*  7.6*   MG   --   2.2        Hepatic Function    Lab Results   Component Value Date/Time    ALB 2.3 (L) 11/29/2017 09:32 AM    TP 5.3 (L) 11/29/2017 09:32 AM     (H) 11/29/2017 09:32 AM    Lab Results   Component Value Date/Time    SGOT 87 (H) 11/29/2017 09:32 AM          Baron Kaylin NP  [facility]  [email or web address]

## 2017-11-30 NOTE — ROUTINE PROCESS
Bedside and Verbal shift change report given to Ana Shaikh RN (oncoming nurse) by Jody Wolff RN (offgoing nurse). Report included the following information SBAR, Kardex, MAR and Recent Results.     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 [facility]) ICD-10-CM: I50.43  ICD-9-CM: 428.43, 428.0  11/26/2017 Yes        Liver mass ICD-10-CM: R16.0  ICD-9-CM: 573.9  11/26/2017 Yes        Heart failure ([facility] ICD-10-CM: I50.9  ICD-9-CM: 428.9  11/25/2017 Unknown        Hypertension ICD-10-CM: I10  ICD-9-CM: 401.9  4/24/2017 Yes        CKD (chronic kidney disease) stage 3, GFR 30-59 ml/min ICD-10-CM: N18.3  ICD-9-CM: 585.3  4/24/2017 Yes              BACKGROUND:   Past Medical History:   Past Medical History:   Diagnosis Date    Diabetes ([facility]     Hypertension       Patient taking anticoagulants yes    Patient has a defibrillator: no    History of shots YES for example, flu, pneumonia, tetanus   Isolation History NO for example, MRSA, CDiff    ASSESSMENT:  Changes in Assessment Throughout Shift: None  Significant Changes in 24 hours (for example, RR/code, fall)  Patient has Central Line: no Reasons if yes: N/A  Patient has Christian Cath: no Reasons if yes: N/A   Mobility Issues  PT  IV Patency  OR Checklist  Pending Tests    Last Vitals:  Vitals w/ MEWS Score (last day)     Date/Time MEWS Score Pulse Resp Temp BP Level of Consciousness SpO2    11/30/17 0426 1 79 18 97.7 °F (36.5 °C) (!)  167/98 Alert 93 %    11/29/17 2349 1 76 18 97.3 °F (36.3 °C) (!)  152/99 Alert 100 %    11/29/17 2000 1 88 18 98.3 °F (36.8 °C) 134/84 Alert 97 %    11/29/17 1543 1 90 18 97.7 °F (36.5 °C) (!)  152/99 Alert 100 %    11/29/17 1310 1 89 18 97.7 °F (36.5 °C) (!)  153/97 Alert 98 %    11/29/17 0838 2 (!)  102 20 96.8 °F (36 °C) (!)  140/96 Alert 100 %    11/29/17 0418 1 94 20 97.1 °F (36.2 °C) 133/89 Alert 98 %    11/29/17 0000 1 68 20 97.3 °F (36.3 °C) (!)  146/98 Alert 97 %            PAIN    Pain Assessment    Pain Intensity 1: 0 (11/30/17 0426)         Pain Intervention(s) 1: Medication (see MAR)    Patient Stated Pain Goal: 0  Intervention effective: N/A  Time of last intervention: N/A Reassessment Completed: N/A   Other actions taken for pain: N/A    Last 3 Weights:  Last 3 Recorded Weights in this Encounter    11/25/17 1440 11/26/17 1721 11/28/17 0630   Weight: 47.6 kg (105 lb) 47.6 kg (104 lb 15 oz) 50.5 kg (111 lb 4.8 oz)   Weight change:     INTAKE/OUPUT    Current Shift: 11/29 1901 - 11/30 0700  In: -   Out: 400 [Urine:400]    Last three shifts: 11/28 0701 - 11/29 1900  In: 600 [P.O.:600]  Out: 1600 [Urine:1600]    RECOMMENDATIONS AND DISCHARGE PLANNING  Patient needs and requests: None    Pending tests/procedures: Liver biopsy     Discharge plan for patient: TBD    Discharge planning Needs or Barriers: TBD    Estimated Discharge Date: TBD Posted on Whiteboard in Patients Room: no       \"HEALS\" SAFETY CHECK  A safety check occurred in the patient's room between off going nurse and oncoming nurse listed above. The safety check included the below items:    H  High Alert Medications Verify all high alert medication drips (heparin, PCA, etc.)  E  Equipment Suction is set up for ALL patients (with yanker)  Red plugs utilized for all equipment (IV pumps, etc.)  WOWs wiped down at end of shift. Room stocked with oxygen, suction, and other unit-specific supplies  A  Alarms Bed alarm is set for fall risk patients  Ensure chair alarm is in place and activated if patient is up in a chair  L  Lines Check IV for any infiltration  Christian bag is empty if patient has a Christian   Tubing and IV bags are labeled  S  Safety  Room is clean, patient is clean, and equipment is clean.   Hallways are clear from equipment besides carts. Fall bracelet on for fall risk patients  Ensure room is clear and free of clutter  Suction is set up for ALL patients (with won)  Hallways are clear from equipment besides carts.    Isolation precautions followed, supplies available outside room, sign posted    Melissa Viveros RN

## 2017-11-30 NOTE — PROCEDURES
RADIOLOGY POST PROCEDURE NOTE     November 30, 2017       11:42 AM     Preoperative Diagnosis:   Liver mass. Postoperative Diagnosis:  Same. :  Dr. Issa Rogers    Assistant:  None. Type of Anesthesia: 1% plain lidocaine and IV moderate sedation with Versed and Fentanyl. Procedure/Description:  Image guided liver mass core needle Bx. Findings:   No bleeding. Estimated blood Loss:  Minimal    Specimen Removed:   yes    Blood transfusions:  None. Implants:  None. Complications: None    Condition: Stable    Discharge Plan:  Continue care.     Prince Gutierrez MD

## 2017-11-30 NOTE — PROGRESS NOTES
Problem: Self Care Deficits Care Plan (Adult)  Goal: *Acute Goals and Plan of Care (Insert Text)  Occupational Therapy Goals  Initiated 11/29/2017 within 7 day(s). 1.  Patient will perform lower body dressing with supervision/set-up. 2.  Patient will perform functional task in standing for 3 minutes with supervision for balance. 3.  Patient will perform toilet transfers with supervision/set-up. 4.  Patient will participate in upper extremity therapeutic exercise/activities with supervision/set-up for 8 minutes to increase strength/endurance for ADLs. Outcome: Progressing Towards Goal  Occupational Therapy TREATMENT    Patient: Florencio Pride (89 y.o. male)  Date: 11/30/2017  Diagnosis: Heart failure ([facility] [address] <principal problem not specified>       Precautions: Fall  Chart, occupational therapy assessment, plan of care, and goals were reviewed. ASSESSMENT:  Pt presented supine in bed with nurse Juanita Pedro present. Pt had just returned to room after a procedure. He was not very agreeable to skilled OT services saying he was very tired and hungry. Educated pt on importance of participating in therapy for improved strength and endurance in preparation to performing self-care tasks. Pt agreed to participate. Pt participated in TherEx for improved strength and endurance for participation in ADLs. Pt performed shoulder flexion/extension and  strengthening 1 set x 10 reps each exercise. Pt was left comfortable in bed with call bell within reach. Progression toward goals:  [x]          Improving appropriately and progressing toward goals  []          Improving slowly and progressing toward goals  []          Not making progress toward goals and plan of care will be adjusted     PLAN:  Patient continues to benefit from skilled intervention to address the above impairments. Continue treatment per established plan of care.   Discharge Recommendations:  [address] Recommendations for Discharge:  TBD      G-CODES:     Self Care  Current  CK= 40-59%. The severity rating is based on the Level of Assistance required for Functional Mobility and ADLs. SUBJECTIVE:   Patient stated My head is dizzy.     OBJECTIVE DATA SUMMARY:   Cognitive/Behavioral Status:  Neurologic State: Drowsy  Orientation Level: Oriented X4  Cognition: Follows commands  Safety/Judgement: Fall prevention    Functional Mobility and Transfers for ADLs:   Bed Mobility:  Rolling: Contact guard assistance  Supine to Sit: Contact guard assistance  Sit to Supine: Minimum assistance  Scooting: Contact guard assistance   Transfers:  Sit to Stand: Minimum assistance; Moderate assistance    Balance:  Sitting: Intact  Standing: Intact; With support  Standing - Static: Fair  Standing - Dynamic : Poor        Therapeutic Exercises:   Pt participated in TherEx for improved strength and endurance for participation in ADLs. Pt performed shoulder flexion/extension and  strengthening 1 set x 10 reps each exercise. Pain:  Pt reports 0/10 pain or discomfort prior to treatment.    Pt reports 0/10 pain or discomfort post treatment. Activity Tolerance:    Fair    Please refer to the flowsheet for vital signs taken during this treatment.   After treatment:   []  Patient left in no apparent distress sitting up in chair  [x]  Patient left in no apparent distress in bed  [x]  Call bell left within reach  [x]  Nursing notified  []  Caregiver present  []  Bed alarm activated    JOSE ANTONIO Howe  Time Calculation: 10 mins

## 2017-11-30 NOTE — PROGRESS NOTES
Progress Note    Patient: Jerry Nelson MRN: [id number]  SSN: [id number]    YOB: 1935  Age: 80 y.o. Sex: male      Admit Date: 11/25/2017    LOS: 5 days     Subjective:     Patient admitted with FTT with hepatic mass. Had biopsy today. Objective:     Vitals:    11/30/17 1025 11/30/17 1030 11/30/17 1035 11/30/17 1040   BP: 143/82 (!) 140/97 (!) 152/98 (!) 141/94   Pulse: 87 91 81 83   Resp: 18 18 18 18   Temp:       SpO2: 100% 98% 99% 99%   Weight:       Height:            Intake and Output:  Current Shift:    Last three shifts: 11/28 1901 - 11/30 0700  In: -   Out: 800 [Urine:800]    Physical Exam:   GENERAL: alert, cooperative, mild distress, appears older than stated age  LUNG: clear to auscultation bilaterally  HEART: regular rate and rhythm, S1, S2 normal, no murmur, click, rub or gallop    Lab/Data Review: All lab results for the last 24 hours reviewed. 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 Consult oncology when biopsy back.     Signed By: Sg Valles MD     November 30, 2017

## 2017-11-30 NOTE — PROGRESS NOTES
NUTRITION    Nursing Referral: [facility]   Nutrition Consult: General Nutrition Management & Supplements      RECOMMENDATIONS / PLAN:     - Continue current nutrition interventions  - Continue RD inpatient monitoring and evaluation. NUTRITION INTERVENTIONS & DIAGNOSIS:     [x] Meals/Snacks: modified diet  [x] Medical food supplementation: Glucerna Shake 4x daily  [x] Vitamin/mineral supplementation:  MVI    Nutrition Diagnosis:  Inadequate oral intake related to decreased appetite as evidenced by poor meal intake PTA    Patient meets criteria for Severe Protein Calorie Malnutrition as evidenced by:   ASPEN Malnutrition Criteria  Acute Illness, Chronic Illness, or Social/Enviornmental: Chronic illness  Body Fat: Severe (triceps)  Muscle Mass: Severe (visible bones of ribs)  ASPEN Malnutrition Score - Chronic Illness: 12  Chronic Illness - Malnutrition Diagnosis: Severe malnutrition. ASSESSMENT:     11/30: Pt was NPO today for procedure; diet resumed. Reported good appetite; had good meal and supplement intake during previous days. 11/28: Pt reported good appetite and meal intake. Tolerating diet. Consuming supplements; increased to 4 x daily per MD. Nutrition focused physical exam conducted today. 11/27: Pt unavailable at time of visit. Has poor breakfast intake per chart. Reported poor appetite and meal intake PTA per nursing screen. Noted wt loss PTA.  Underweight; BMI 17.46 kg/m^2    Average po intake adequate to meet patients estimated nutritional needs:   [x] Yes (meals and supplements)     [] No   [] Unable to determine at this time    Diet: DIET NUTRITIONAL SUPPLEMENTS Dinner, All Meals; [address] SHAKE  DIET DIABETIC CONSISTENT CARB Mechanical Soft      Food Allergies:  None known   Current Appetite:   [x] Good     [] Fair     [] Poor     [] Other: unknown   Appetite/meal intake prior to admission:   [] Good     [] Fair     [x] Poor: per nursing screen     [] Other:  Feeding Limitations:  [] Swallowing difficulty    [] Chewing difficulty    [] Other:  Current Meal Intake:   Patient Vitals for the past 100 hrs:   % Diet Eaten   11/28/17 1744 50 %   11/28/17 1229 50 %   11/28/17 0851 50 %   11/27/17 1246 50 %   11/27/17 0830 45 %       BM:  11/29  Skin Integrity: no pressure injury or wound noted   Edema: 1+ LEs  Pertinent Medications: Reviewed    Recent Labs      11/29/17   0932   NA  141   K  4.0   CL  109*   CO2  23   GLU  111*   BUN  74*   CREA  2.12*   CA  8.0*   ALB  2.3*   SGOT  87*   ALT  95*       Intake/Output Summary (Last 24 hours) at 11/30/17 1721  Last data filed at 11/29/17 2350   Gross per 24 hour   Intake                0 ml   Output              400 ml   Net             -400 ml       Anthropometrics:  Ht Readings from Last 1 Encounters:   11/25/17 5' 5\" (1.651 m)     Last 3 Recorded Weights in this Encounter    11/26/17 1721 11/28/17 0630 11/30/17 0651   Weight: 47.6 kg (104 lb 15 oz) 50.5 kg (111 lb 4.8 oz) 46.6 kg (102 lb 12.8 oz)     Body mass index is 17.11 kg/(m^2). Underweight      Weight History:  Weight loss of 8 lb (7.1%) x 7 months PTA per chart hx. Pt reported usual body weight of 200 lb several months ago PTA.      Weight Metrics 11/30/2017 11/23/2017 11/18/2017 4/27/2017 8/24/2012   Weight 102 lb 12.8 oz 105 lb 100 lb 113 lb 140 lb   BMI 17.11 kg/m2 17.47 kg/m2 16.64 kg/m2 18.8 kg/m2 23.3 kg/m2        Admitting Diagnosis: Heart failure ([facility] [address]  Pertinent PMHx:  DM, HTN    Education Needs:        [x] None identified  [] Identified - Not appropriate at this time  []  Identified and addressed - refer to education log  Learning Limitations:   [x] None identified  [] Identified    Cultural, Gnosticism & ethnic food preferences:  [x] None identified    [] Identified and addressed     ESTIMATED NUTRITION NEEDS:     Calories: 7786-8148 kcal (MSJx1.2-1.4) based on  [] Actual BW      [x] IBW: 62 kg   Protein: 50-62 gm (0.8-1 gm/kg) based on  [] Actual BW      [x] IBW   Fluid: 1 mL/kcal MONITORING & EVALUATION:     Nutrition Goal(s):   1. Po intake of meals will meet >75% of patient estimated nutritional needs within the next 7 days.   Outcome:  [x] Met/Ongoing    []  Not Met    [] New/Initial Goal     Monitoring:   [x] Diet tolerance   [x] Meal intake   [x] Supplement intake   [] GI symptoms/ability to tolerate po diet   [] Respiratory status   [] Plan of care      Previous Recommendations (for follow-up assessments only):     [x]   Implemented       []   Not Implemented (RD to address)      [] No Longer Appropriate     [] No Recommendation Made     Discharge Planning: diabetic diet   [x] Participated in care planning, discharge planning, & interdisciplinary rounds as appropriate      Silvestre Shaikh, [address] [place] [address]   Pager: [phone]

## 2017-11-30 NOTE — PROGRESS NOTES
TRANSFER - OUT REPORT:    Verbal report given to Carmelita Pritchett RN(name) on Rusty Muñoz  being transferred to N(unit) for routine post - op       Report consisted of patients Situation, Background, Assessment and   Recommendations(SBAR). Information from the following report(s) SBAR, Kardex, Procedure Summary and MAR was reviewed with the receiving nurse. Lines:   Peripheral IV 11/25/17 Left Antecubital (Active)   Site Assessment Clean, dry, & intact 11/29/2017  8:34 PM   Phlebitis Assessment 0 11/29/2017  8:34 PM   Infiltration Assessment 0 11/29/2017  8:34 PM   Dressing Status Clean, dry, & intact 11/29/2017  8:34 PM   Dressing Type Tape;Transparent 11/29/2017  8:34 PM   Hub Color/Line Status Pink;Flushed 11/29/2017  8:34 PM   Action Taken Blood drawn 11/25/2017  5:26 PM        Opportunity for questions and clarification was provided.       Patient transported with:   [place]

## 2017-11-30 NOTE — PROGRESS NOTES
Problem: Mobility Impaired (Adult and Pediatric)  Goal: *Acute Goals and Plan of Care (Insert Text)  Physical Therapy Goals  Initiated 11/27/2017 and to be accomplished within 7 day(s)  1. Patient will move from supine to sit and sit to supine , scoot up and down and roll side to side in bed with supervision/set-up. 2.  Patient will transfer from bed to chair and chair to bed with contact guard assist using the least restrictive device. 3.  Patient will perform sit to stand with contact guard assist.  4.  Patient will ambulate with contact guard assist for >50 feet with the least restrictive device. 5.  Patient will demonstrate good seated and standing balance for improved safety during functional tasks. physical Therapy TREATMENT    Patient: Martha Hernandez (89 y.o. male)  Date: 11/30/2017  Diagnosis: Heart failure ([facility] [address] <principal problem not specified>       Precautions: Fall   Chart, physical therapy assessment, plan of care and goals were reviewed. ASSESSMENT:  Found supine in bed, sleeping but easily roused with knock on door. Pt very lethargic and drowsy but agreed to participate in therapy. Pt requires multiple commands and tactile cues at first to begin mobility but pt eventually moved to seated EOB with CGA. Once seated pt required min-a to scoot to EOB for sit to stand positioning. Pt showing repeated head nods due to drowsiness. Pt performed sit to stand with Mod-a utilizing UE pushing from bed. Once standing pt attempted amb forward but immediately began to sit. Pt sat for rest break and nodded to sleep for a moment. After rest break pt stood again to RW and agreed to amb forward. Pt amb 4ft with RW towards wall. Pt suddenly reported they needed to sit when they were 4 ft from bed. PTA attempted to convince pt to amb first but was unable therefore PTA was required to total assist pt back to seated EOB.  Upon sitting pt agreed to return to bed but fell asleep sitting up requiring stimili to awaken, from PTA. Pt returned to supine with CGA/Min-a from PTA. Pt immediately fell asleep and began snoring when supine position was reached. Pt left with call bell left in reach. Progression toward goals:  []      Improving appropriately and progressing toward goals  [x]      Improving slowly and progressing toward goals  []      Not making progress toward goals and plan of care will be adjusted     PLAN:  Patient continues to benefit from skilled intervention to address the above impairments. Continue treatment per established plan of care. Discharge Recommendations:  [facility]  Further Equipment Recommendations for Discharge:  N/A     SUBJECTIVE:   Patient stated \"Jose help me.     OBJECTIVE DATA SUMMARY:   Critical Behavior:  Neurologic State: Alert  Orientation Level: Oriented X4  Cognition: Follows commands  Safety/Judgement: Fall prevention  Functional Mobility Training:  Bed Mobility:  Rolling: Contact guard assistance  Supine to Sit: Contact guard assistance  Sit to Supine: Minimum assistance  Scooting: Contact guard assistance   Transfers:  Sit to Stand: Minimum assistance; Moderate assistance  Stand to Sit: Minimum assistance; Moderate assistance  Balance:  Sitting: Intact  Standing: Intact; With support  Standing - Static: Fair  Standing - Dynamic : Poor  Ambulation/Gait Training:  Distance (ft): 4 Feet (ft)  Assistive Device: Walker, rolling  Ambulation - Level of Assistance: Contact guard assistance;Minimal assistance  Gait Description (WDL): Exceptions to WDL  Gait Abnormalities: Decreased step clearance;Shuffling gait   Base of Support: Narrowed  Stance: Time  Speed/[place]etee: Shuffled; Slow  Step Length: Left shortened;Right shortened  Pain:  Pain Scale 1: Visual  Pain Intensity 1: 0   Activity Tolerance:   fair  Please refer to the flowsheet for vital signs taken during this treatment.   After treatment:   [] Patient left in no apparent distress sitting up in chair  [x] Patient left in no apparent distress in bed  [x] Call bell left within reach  [] Nursing notified  [x] Caregiver present  [] Bed alarm activated      García Ruth   Time Calculation: 23 mins

## 2017-11-30 NOTE — PROGRESS NOTES
[email or web address]  [phone]       Impression  1. Abdominal pain  2. Liver Mass-   - CT abdomen- There is about 5 cm ill-defined mass centered on the interlobar fissure  with associated central biliary duct dilatation and nondilated common bile duct. Small amount of calcifications (21) within the lesion. Liver small with a nodular contour  -CA-19-9,AFP WNL  3. Elevated LFTs  - Positive HBV and HCV  4. ROXANNE  5. DM      Plan:  1. IR for possible liver biopsy today  2.  HCV PCR and GT, HBV DNA PCR pending        Chief Complaint: liver mass    Subjective:  Pt uneventful overnight        Eyes: conjunctiva normal, EOM normal   ENT: Mucous membranes moist, no lesion or thrush   Cardiovascular:  normal rate and regular rhythm, no murmur   Pulmonary/Chest Wall: breath sounds normal and effort normal   Abdominal:  soft, non-acute, non-tender, no appreciable mass or hepatosplenomegaly, no appreciable ascites       Visit Vitals    BP (!) 141/94 (BP 1 Location: Right arm, BP Patient Position: Supine)    Pulse 83    Temp 97.2 °F (36.2 °C)    Resp 18    Ht 5' 5\" (1.651 m)    Wt 46.6 kg (102 lb 12.8 oz)    SpO2 99%    BMI 17.11 kg/m2           Intake/Output Summary (Last 24 hours) at 11/30/17 1230  Last data filed at 11/29/17 2350   Gross per 24 hour   Intake                0 ml   Output              400 ml   Net             -400 ml       CBC w/Diff    Lab Results   Component Value Date/Time    WBC 7.1 11/27/2017 01:16 AM    RBC 2.10 (L) 11/27/2017 01:16 AM    HGB 7.0 (L) 11/27/2017 01:16 AM    HCT 20.1 (L) 11/27/2017 01:16 AM    MCV 95.7 11/27/2017 01:16 AM    MCH 33.3 11/27/2017 01:16 AM    MCHC 34.8 11/27/2017 01:16 AM    RDW 15.3 (H) 11/27/2017 01:16 AM    PLT 92 (L) 11/27/2017 01:16 AM    Lab Results   Component Value Date/Time    GRANS 78 (H) 11/27/2017 01:16 AM    LYMPH 17 (L) 11/27/2017 01:16 AM    EOS 0 11/27/2017 01:16 AM    BASOS 0 11/27/2017 01:16 AM      Basic Metabolic Profile   Recent Labs      11/29/17 0932   NA  141   K  4.0   CL  109*   CO2  23   BUN  74*   CA  8.0*        Hepatic Function    Lab Results   Component Value Date/Time    ALB 2.3 (L) 11/29/2017 09:32 AM    TP 5.3 (L) 11/29/2017 09:32 AM     (H) 11/29/2017 09:32 AM    Lab Results   Component Value Date/Time    SGOT 87 (H) 11/29/2017 09:32 AM          Charlesetta Severance, ESTELLA  [facility], [address]  [email or web address]

## 2017-12-01 NOTE — DISCHARGE SUMMARY
Discharge Summary     Patient: Martha Hernandez MRN: [id number]  SSN: [id number]    YOB: 1935  Age: 80 y.o. 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 ([facility] ICD-10-CM: I50.43  ICD-9-CM: 428.43, 428.0  11/26/2017 - Present        Liver mass ICD-10-CM: R16.0  ICD-9-CM: 573.9  11/26/2017 - Present        Heart failure ([facility] ICD-10-CM: I50.9  ICD-9-CM: 428.9  11/25/2017 - Present        Chest pain ICD-10-CM: R07.9  ICD-9-CM: 786.50  4/26/2017 - Present        Chest pain at rest ICD-10-CM: R07.9  ICD-9-CM: 786.50  4/24/2017 - Present        Hypertension ICD-10-CM: I10  ICD-9-CM: 401.9  4/24/2017 - Present        CKD (chronic kidney disease) stage 3, GFR 30-59 ml/min ICD-10-CM: N18.3  ICD-9-CM: 585.3  4/24/2017 - Present               Discharge Condition: Stable    Hospital Course: Patient admitted due to FTT with abdominal pain. On CT, patient found to have ascites and liveer mass. Patient has undergone paracentesis and liver biopsy. Patient has been eating and has no SOB but is too weak to go home. Will discharge patient to SNF for therapy. Consults: Gastroenterology and Interventional radiology    Significant Diagnostic Studies: radiology: CT scan: liver mass and biopsy    Disposition: SNF    Discharge Medications:   Current Discharge Medication List      CONTINUE these medications which have NOT CHANGED    Details   aspirin delayed-release 81 mg tablet Take 1 Tab by mouth daily. Qty: 30 Tab, Refills: 0      felodipine (PLENDIL SR) 10 mg 24 hr tablet Take 1 Tab by mouth daily. Qty: 30 Tab, Refills: 1      hydroCHLOROthiazide (HYDRODIURIL) 25 mg tablet Take 1 Tab by mouth daily.   Qty: 30 Tab, Refills: 0 lisinopril (PRINIVIL, ZESTRIL) 10 mg tablet Take 1 Tab by mouth daily. Qty: 30 Tab, Refills: 0             Activity: Activity as tolerated  Diet: Regular Diet  Wound Care: None needed    Follow-up Appointments   Procedures    FOLLOW UP VISIT Appointment in: Other (Specify) I will follow as attending. I will follow as attending. Standing Status:   Standing     Number of Occurrences:   1     Order Specific Question:   Appointment in     Answer:    Other (Specify)       Signed By: Lenny Teresa MD     December 1, 2017

## 2017-12-01 NOTE — DISCHARGE INSTRUCTIONS
DISCHARGE SUMMARY from Nurse    PATIENT INSTRUCTIONS:    After general anesthesia or intravenous sedation, for 24 hours or while taking prescription Narcotics:  · Limit your activities  · Do not drive and operate hazardous machinery  · Do not make important personal or business decisions  · Do  not drink alcoholic beverages  · If you have not urinated within 8 hours after discharge, please contact your surgeon on call. Report the following to your surgeon:  · Excessive pain, swelling, redness or odor of or around the surgical area  · Temperature over 100.5  · Nausea and vomiting lasting longer than 4 hours or if unable to take medications  · Any signs of decreased circulation or nerve impairment to extremity: change in color, persistent  numbness, tingling, coldness or increase pain  · Any questions    What to do at Home:  Recommended activity: Activity as tolerated. Out of bed to recliner. If you experience any of the following symptoms fever 101 or greater, nausea, vomiting, diarrhea, shortness of breath, any pain, any problems or concerns. Dr. Bertin Bazzi will follow up with you. *  Please give a list of your current medications to your Primary Care Provider. *  Please update this list whenever your medications are discontinued, doses are      changed, or new medications (including over-the-counter products) are added. *  Please carry medication information at all times in case of emergency situations. These are general instructions for a healthy lifestyle:    No smoking/ No tobacco products/ Avoid exposure to second hand smoke  Surgeon General's Warning:  Quitting smoking now greatly reduces serious risk to your health.     Obesity, smoking, and sedentary lifestyle greatly increases your risk for illness    A healthy diet, regular physical exercise & weight monitoring are important for maintaining a healthy lifestyle    You may be retaining fluid if you have a history of heart failure or if you experience any of the following symptoms:  Weight gain of 3 pounds or more overnight or 5 pounds in a week, increased swelling in our hands or feet or shortness of breath while lying flat in bed. Please call your doctor as soon as you notice any of these symptoms; do not wait until your next office visit. Recognize signs and symptoms of STROKE:    F-face looks uneven    A-arms unable to move or move unevenly    S-speech slurred or non-existent    T-time-call 911 as soon as signs and symptoms begin-DO NOT go       Back to bed or wait to see if you get better-TIME IS BRAIN. Warning Signs of HEART ATTACK     Call 911 if you have these symptoms:   Chest discomfort. Most heart attacks involve discomfort in the center of the chest that lasts more than a few minutes, or that goes away and comes back. It can feel like uncomfortable pressure, squeezing, fullness, or pain.  Discomfort in other areas of the upper body. Symptoms can include pain or discomfort in one or both arms, the back, neck, jaw, or stomach.  Shortness of breath with or without chest discomfort.  Other signs may include breaking out in a cold sweat, nausea, or lightheadedness. Don't wait more than five minutes to call 911 - MINUTES MATTER! Fast action can save your life. Calling 911 is almost always the fastest way to get lifesaving treatment. Emergency Medical Services staff can begin treatment when they arrive -- up to an hour sooner than if someone gets to the hospital by car. The discharge information has been reviewed with the patient. The patient verbalized understanding. Discharge medications reviewed with the patient and appropriate educational materials and side effects teaching were provided.   ___________________________________________________________________________________________________________________________________     Heart Failure: Care Instructions  Your Care Instructions    Heart failure occurs when your heart does not pump as much blood as the body needs. Failure does not mean that the heart has stopped pumping but rather that it is not pumping as well as it should. Over time, this causes fluid buildup in your lungs and other parts of your body. Fluid buildup can cause shortness of breath, fatigue, swollen ankles, and other problems. By taking medicines regularly, reducing sodium (salt) in your diet, checking your weight every day, and making lifestyle changes, you can feel better and live longer. Follow-up care is a key part of your treatment and safety. Be sure to make and go to all appointments, and call your doctor if you are having problems. It's also a good idea to know your test results and keep a list of the medicines you take. How can you care for yourself at home? Medicines  ? · Be safe with medicines. Take your medicines exactly as prescribed. Call your doctor if you think you are having a problem with your medicine. ? · Do not take any vitamins, over-the-counter medicine, or herbal products without talking to your doctor first. Ángel Walker not take ibuprofen (Advil or Motrin) and naproxen (Aleve) without talking to your doctor first. They could make your heart failure worse. ? · You may be taking some of the following medicine. ¨ Beta-blockers can slow heart rate, decrease blood pressure, and improve your condition. Taking a beta-blocker may lower your chance of needing to be hospitalized. ¨ Angiotensin-converting enzyme inhibitors (ACEIs) reduce the heart's workload, lower blood pressure, and reduce swelling. Taking an ACEI may lower your chance of needing to be hospitalized again. ¨ Angiotensin II receptor blockers (ARBs) work like ACEIs. Your doctor may prescribe them instead of ACEIs. ¨ Diuretics, also called water pills, reduce swelling. ¨ Potassium supplements replace this important mineral, which is sometimes lost with diuretics.   ¨ Aspirin and other blood thinners prevent blood clots, which can cause a stroke or heart attack. ? You will get more details on the specific medicines your doctor prescribes. Diet  ? · Your doctor may suggest that you limit sodium to 2,000 milligrams (mg) a day or less. That is less than 1 teaspoon of salt a day, including all the salt you eat in cooking or in packaged foods. People get most of their sodium from processed foods. Fast food and restaurant meals also tend to be very high in sodium. ? · Ask your doctor how much liquid you can drink each day. You may have to limit liquids. ?Weight  ? · Weigh yourself without clothing at the same time each day. Record your weight. Call your doctor if you have a sudden weight gain, such as more than 2 to 3 pounds in a day or 5 pounds in a week. (Your doctor may suggest a different range of weight gain.) A sudden weight gain may mean that your heart failure is getting worse. ? Activity level  ? · Start light exercise (if your doctor says it is okay). Even if you can only do a small amount, exercise will help you get stronger, have more energy, and manage your weight and your stress. Walking is an easy way to get exercise. Start out by walking a little more than you did before. Bit by bit, increase the amount you walk. ? · When you exercise, watch for signs that your heart is working too hard. You are pushing yourself too hard if you cannot talk while you are exercising. If you become short of breath or dizzy or have chest pain, stop, sit down, and rest.   ? · If you feel \"wiped out\" the day after you exercise, walk slower or for a shorter distance until you can work up to a better pace. ? · Get enough rest at night. Sleeping with 1 or 2 pillows under your upper body and head may help you breathe easier. ? Lifestyle changes  ? · Do not smoke. Smoking can make a heart condition worse. If you need help quitting, talk to your doctor about stop-smoking programs and medicines.  These can increase your chances of quitting for good. Quitting smoking may be the most important step you can take to protect your heart. ? · Limit alcohol to 2 drinks a day for men and 1 drink a day for women. Too much alcohol can cause health problems. ? · Avoid getting sick from colds and the flu. Get a pneumococcal vaccine shot. If you have had one before, ask your doctor whether you need another dose. Get a flu shot each year. If you must be around people with colds or the flu, wash your hands often. When should you call for help? Call 911 if you have symptoms of sudden heart failure such as:  ? · You have severe trouble breathing. ? · You cough up pink, foamy mucus. ? · You have a new irregular or rapid heartbeat. ?Call your doctor now or seek immediate medical care if:  ? · You have new or increased shortness of breath. ? · You are dizzy or lightheaded, or you feel like you may faint. ? · You have sudden weight gain, such as more than 2 to 3 pounds in a day or 5 pounds in a week. (Your doctor may suggest a different range of weight gain.)   ? · You have increased swelling in your legs, ankles, or feet. ? · You are suddenly so tired or weak that you cannot do your usual activities. ? Watch closely for changes in your health, and be sure to contact your doctor if you develop new symptoms. Where can you learn more? Go to http://tung-madeline.info/. Enter V848 in the search box to learn more about \"Heart Failure: Care Instructions. \"  Current as of: September 21, 2016  Content Version: 11.4  © 8594-3676 [employer]. Care instructions adapted under license by [employer] (which disclaims liability or warranty for this information). If you have questions about a medical condition or this instruction, always ask your healthcare professional. [address] any warranty or liability for your use of this information.          Limiting Sodium and Fluids With Heart Failure: Care Instructions  Your Care Instructions    Sodium causes your body to hold on to extra water. This may cause your heart failure symptoms to get worse. Limiting sodium may help you feel better and lower your risk of having to go to the hospital.  People get most of their sodium from processed foods. Fast food and restaurant meals also tend to be very high in sodium. Your doctor may suggest that you limit sodium to 2,000 milligrams (mg) a day or less. That is less than 1 teaspoon of salt a day, including all the salt you eat in cooked or packaged foods. Usually, you have to limit the amount of liquids you drink only if your heart failure is severe. Limiting sodium alone often is enough to help your body get rid of extra fluids. However, your doctor may tell you to limit your fluid intake to a set amount each day. Follow-up care is a key part of your treatment and safety. Be sure to make and go to all appointments, and call your doctor if you are having problems. It's also a good idea to know your test results and keep a list of the medicines you take. How can you care for yourself at home? Read food labels  · Read food labels on cans and food packages. The labels tell you how much sodium is in each serving. Make sure that you look at the serving size. If you eat more than the serving size, you have eaten more sodium than is listed for one serving. · Food labels also tell you the Percent Daily Value. If the Percent Daily Value says 50%, it means that you will get at least 50% of all the sodium you need for the entire day in one serving. Choose products with low Percent Daily Values for sodium. · Be aware that sodium can come in forms other than salt, including monosodium glutamate (MSG), sodium citrate, and sodium bicarbonate (baking soda). MSG is often added to Asian food. You can sometimes ask for food without MSG or salt.   Buy low-sodium foods  · Buy foods that are labeled \"unsalted\" (no salt added), \"sodium-free\" (less than 5 mg of sodium per serving), or \"low-sodium\" (less than 140 mg of sodium per serving). A food labeled \"light sodium\" has less than half of the full-sodium version of that food. Foods labeled \"reduced-sodium\" may still have too much sodium. · Buy fresh vegetables or plain, frozen vegetables. Buy low-sodium versions of canned vegetables, soups, and other canned goods. Prepare low-sodium meals  · Use less salt each day when cooking. Reducing salt in this way will help you adjust to the taste. Do not add salt after cooking. Take the salt shaker off the table. · Flavor your food with garlic, lemon juice, onion, vinegar, herbs, and spices instead of salt. Do not use soy sauce, steak sauce, onion salt, garlic salt, mustard, or ketchup on your food. · Make your own salad dressings, sauces, and ketchup without adding salt. · Use less salt (or none) when recipes call for it. You can often use half the salt a recipe calls for without losing flavor. Other dishes like rice, pasta, and grains do not need added salt. · Rinse canned vegetables. This removes some-but not all-of the salt. · Avoid water that has a naturally high sodium content or that has been treated with water softeners, which add sodium. Call your local water company to find out the sodium content of your water supply. If you buy bottled water, read the label and choose a sodium-free brand. Avoid high-sodium foods, such as:  · Smoked, cured, salted, and canned meat, fish, and poultry. · Ham, wright, hot dogs, and luncheon meats. · Regular, hard, and processed cheese and regular peanut butter. · Crackers with salted tops. · Frozen prepared meals. · Canned and dried soups, broths, and bouillon, unless labeled sodium-free or low-sodium. · Canned vegetables, unless labeled sodium-free or low-sodium. · Salted snack foods such as chips and pretzels. · [place]ma fries, pizza, tacos, and other fast foods.   · Pickles, olives, ketchup, and other condiments, especially soy sauce, unless labeled sodium-free or low-sodium. If you cannot cook for yourself  · Have family members or friends help you, or have someone cook low-sodium meals. · Check with your local senior nutrition program to find out where meals are served and whether they offer a low-sodium option. You can often find these programs through your local health department or hospital.  · Have meals delivered to your home. Most [facility] have a Meals on mediafeedia. These programs provide one hot meal a day for older adults, delivered to their homes. Ask whether these meals are low-sodium. Let them know that you are on a low-sodium diet. Limiting fluid intake  · Find a method that works for you. You might simply write down how much you drink every time you do. Some people keep a container filled with the amount of fluid allowed for that day. If they drink from a source other than the container, then they pour out that amount. · Measure your regular drinking glasses to find out how much fluid each one holds. Once you know this, you will not have to measure every time. · Besides water, milk, juices, and other drinks, some foods have a lot of fluid. Count any foods that will melt (such as ice cream or gelatin dessert) or liquid foods (such as soup) as part of your fluid intake for the day. Where can you learn more? Go to http://tung-madeline.info/. Enter A166 in the search box to learn more about \"Limiting Sodium and Fluids With Heart Failure: Care Instructions. \"  Current as of: September 21, 2016  Content Version: 11.4  © 2144-4633 [employer]. Care instructions adapted under license by [employer] (which disclaims liability or warranty for this information).  If you have questions about a medical condition or this instruction, always ask your healthcare professional. [place] [address] any warranty or liability for your use of this information. Leg and Ankle Edema: Care Instructions  Your Care Instructions  Swelling in the legs, ankles, and feet is called edema. It is common after you sit or stand for a while. Long plane flights or car rides often cause swelling in the legs and feet. You may also have swelling if you have to stand for long periods of time at your job. Problems with the veins in the legs (varicose veins) and changes in hormones can also cause swelling. Sometimes the swelling in the ankles and feet is caused by a more serious problem, such as heart failure, infection, blood clots, or liver or kidney disease. Follow-up care is a key part of your treatment and safety. Be sure to make and go to all appointments, and call your doctor if you are having problems. It's also a good idea to know your test results and keep a list of the medicines you take. How can you care for yourself at home? · If your doctor gave you medicine, take it as prescribed. Call your doctor if you think you are having a problem with your medicine. · Whenever you are resting, raise your legs up. Try to keep the swollen area higher than the level of your heart. · Take breaks from standing or sitting in one position. ¨ Walk around to increase the blood flow in your lower legs. ¨ Move your feet and ankles often while you stand, or tighten and relax your leg muscles. · Wear support stockings. Put them on in the morning, before swelling gets worse. · Eat a balanced diet. Lose weight if you need to. · Limit the amount of salt (sodium) in your diet. Salt holds fluid in the body and may increase swelling. When should you call for help? Call 911 anytime you think you may need emergency care. For example, call if:  ? · You have symptoms of a blood clot in your lung (called a pulmonary embolism). These may include:  ¨ Sudden chest pain. ¨ Trouble breathing. ¨ Coughing up blood.    ?Call your doctor now or seek immediate medical care if:  ? · You have signs of a blood clot, such as:  ¨ Pain in your calf, back of the knee, thigh, or groin. ¨ Redness and swelling in your leg or groin. ? · You have symptoms of infection, such as:  ¨ Increased pain, swelling, warmth, or redness. ¨ Red streaks or pus. ¨ A fever. ? Watch closely for changes in your health, and be sure to contact your doctor if:  ? · Your swelling is getting worse. ? · You have new or worsening pain in your legs. ? · You do not get better as expected. Where can you learn more? Go to http://tung-madeline.info/. Enter B981 in the search box to learn more about \"Leg and Ankle Edema: Care Instructions. \"  Current as of: March 20, 2017  Content Version: 11.4  © 5736-4809 [employer]. Care instructions adapted under license by [employer] (which disclaims liability or warranty for this information). If you have questions about a medical condition or this instruction, always ask your healthcare professional. [place] [address] any warranty or liability for your use of this information. Patient armband removed and shredded      DISCHARGE SUMMARY from Nurse    PATIENT INSTRUCTIONS:    After general anesthesia or intravenous sedation, for 24 hours or while taking prescription Narcotics:  · Limit your activities  · Do not drive and operate hazardous machinery  · Do not make important personal or business decisions  · Do  not drink alcoholic beverages  · If you have not urinated within 8 hours after discharge, please contact your surgeon on call.     Report the following to your surgeon:  · Excessive pain, swelling, redness or odor of or around the surgical area  · Temperature over 100.5  · Nausea and vomiting lasting longer than 4 hours or if unable to take medications  · Any signs of decreased circulation or nerve impairment to extremity: change in color, persistent  numbness, tingling, coldness or increase pain  · Any questions    What to do at Home:  Recommended activity: Activity as tolerated    If you experience any of the following symptoms Nausea, vomiting, diarrhea, fever greater than 100.5, dizziness, severe headache, shortness of breath, chest pain, increased pain, please follow up with PCP. *  Please give a list of your current medications to your Primary Care Provider. *  Please update this list whenever your medications are discontinued, doses are      changed, or new medications (including over-the-counter products) are added. *  Please carry medication information at all times in case of emergency situations. These are general instructions for a healthy lifestyle:    No smoking/ No tobacco products/ Avoid exposure to second hand smoke  Surgeon General's Warning:  Quitting smoking now greatly reduces serious risk to your health. Obesity, smoking, and sedentary lifestyle greatly increases your risk for illness    A healthy diet, regular physical exercise & weight monitoring are important for maintaining a healthy lifestyle    You may be retaining fluid if you have a history of heart failure or if you experience any of the following symptoms:  Weight gain of 3 pounds or more overnight or 5 pounds in a week, increased swelling in our hands or feet or shortness of breath while lying flat in bed. Please call your doctor as soon as you notice any of these symptoms; do not wait until your next office visit. Recognize signs and symptoms of STROKE:    F-face looks uneven    A-arms unable to move or move unevenly    S-speech slurred or non-existent    T-time-call 911 as soon as signs and symptoms begin-DO NOT go       Back to bed or wait to see if you get better-TIME IS BRAIN. Warning Signs of HEART ATTACK     Call 911 if you have these symptoms:   Chest discomfort. Most heart attacks involve discomfort in the center of the chest that lasts more than a few minutes, or that goes away and comes back.  It can feel like uncomfortable pressure, squeezing, fullness, or pain.  Discomfort in other areas of the upper body. Symptoms can include pain or discomfort in one or both arms, the back, neck, jaw, or stomach.  Shortness of breath with or without chest discomfort.  Other signs may include breaking out in a cold sweat, nausea, or lightheadedness. Don't wait more than five minutes to call 911 - MINUTES MATTER! Fast action can save your life. Calling 911 is almost always the fastest way to get lifesaving treatment. Emergency Medical Services staff can begin treatment when they arrive -- up to an hour sooner than if someone gets to the hospital by car. The discharge information has been reviewed with the patient and caregiver. The patient and caregiver verbalized understanding. Discharge medications reviewed with the patient and caregiver and appropriate educational materials and side effects teaching were provided. Patient armband removed and shredded  MyChart Activation    Thank you for requesting access to [facility]. Please follow the instructions below to securely access and download your online medical record. [facility] allows you to send messages to your doctor, view your test results, renew your prescriptions, schedule appointments, and more. How Do I Sign Up? 1. In your internet browser, go to [email or web address]  2. Click on the First Time User? Click Here link in the Sign In box. You will be redirect to the New Member Sign Up page. 3. Enter your [employer] Access Code exactly as it appears below. You will not need to use this code after youve completed the sign-up process. If you do not sign up before the expiration date, you must request a new code. [employer] Access Code: [email or web address]  Expires: 2018  2:51 PM (This is the date your [employer] access code will )    4.  Enter the last four digits of your Social Security Number (xxxx) and Date of Birth (mm/dd/yyyy) as indicated and click Submit. You will be taken to the next sign-up page. 5. Create a natuet ID. This will be your [employer] login ID and cannot be changed, so think of one that is secure and easy to remember. 6. Create a [employer] password. You can change your password at any time. 7. Enter your Password Reset Question and Answer. This can be used at a later time if you forget your password. 8. Enter your e-mail address. You will receive e-mail notification when new information is available in [address] 9. Click Sign Up. You can now view and download portions of your medical record. 10. Click the Download Summary menu link to download a portable copy of your medical information. Additional Information    If you have questions, please visit the Frequently Asked Questions section of the [employer] website at [email or web address] Remember, [employer] [employer] is NOT to be used for urgent needs.  For medical emergencies, dial 911.        ___________________________________________________________________________________________________________________________________

## 2017-12-01 NOTE — PROGRESS NOTES
Sitting up on side of bed. A/OX4. Denies pain. Appears to be in no resp. distress. Irrigated HOLLY drain with Normal saline 10cc forward flush only. Scant amount of leakage around drainage site, dressing intact. Emptied HOLLY drain. 10cc of pale tannish cloudy fluid. Side rails X2. Call bell phone within reach.

## 2017-12-01 NOTE — PROGRESS NOTES
Received in bed watching TV. A/OX4. Denies pain. No resp. distress noted. Fall precautions. Call bell phone within reach. Side rails X3.

## 2017-12-01 NOTE — NURSE NAVIGATOR
Patient is being discharged to [facility] today, transport has been set up with [facility] for 12:30 today. Notified sharita Noguera.

## 2017-12-01 NOTE — PROGRESS NOTES
Problem: Nutrition Deficit  Goal: *Optimize nutritional status  Outcome: Progressing Towards Goal  Encourage family to bring in favorite meal for the patient.

## 2017-12-01 NOTE — PROGRESS NOTES
1930-Bedside and Verbal shift change report given to [employer] (oncoming nurse) by Valentine Cuenca RN (offgoing nurse). Report included the following information SBAR, Kardex, ED Summary, [facility] and Recent Results.

## 2017-12-01 NOTE — ROUTINE PROCESS
1935 Bedside verbal shift change report received from [facility] [place], RN(offgoing nurse) given to Jennie Durand RN(oncoming nurse). Report included SBAR,MAR,KARDEX,TELE,I/O    4330 Bedside verbal shift change report given to Zack Gongora RN(oncoming nurse) by Jennie Durand RN(offgoing nurse).  Report included SBAR,MAR,KARDEX,TELE

## 2017-12-14 PROBLEM — A41.9 SEPTIC SHOCK (HCC): Status: ACTIVE | Noted: 2017-01-01

## 2017-12-14 PROBLEM — R65.21 SEPTIC SHOCK (HCC): Status: ACTIVE | Noted: 2017-01-01

## 2017-12-14 PROBLEM — I21.3 STEMI (ST ELEVATION MYOCARDIAL INFARCTION) (HCC): Status: ACTIVE | Noted: 2017-01-01

## 2017-12-14 PROBLEM — A41.9 SEPSIS (HCC): Status: ACTIVE | Noted: 2017-01-01

## 2017-12-14 PROBLEM — I95.9 HYPOTENSION: Status: ACTIVE | Noted: 2017-01-01

## 2017-12-14 PROBLEM — D64.9 ANEMIA: Status: ACTIVE | Noted: 2017-01-01

## 2017-12-14 NOTE — ED PROVIDER NOTES
EMERGENCY DEPARTMENT HISTORY AND PHYSICAL EXAM    6:01 PM      Date: 12/14/2017  Patient Name: Rona Mckeon    History of Presenting Illness     Chief Complaint   Patient presents with    Lethargy         History Provided By: Chart Review    Chief Complaint: AMS  Duration:  Days  Timing:  Gradual  Location: n/z  Quality: n/a  Severity: N/A  Modifying Factors: n/a  Associated Symptoms:  Unable, AMS      Additional History (Context): Rona Mckeon is a 80 y.o. male with diabetes and hypertension who presents to the ED via EMS from [facility] living with a chief complaint of constant and progressively worsening lethargy since this morning. Patient is non-verbal. Dr. Amber León (PCP) states that the patient had weight loss for the last 1 year and states that the patient is very cachetic. HPI LIMITED due to patient's non-verbal condition and acuity of condition. PCP: Nighat Hewitt MD    Current Facility-Administered Medications   Medication Dose Route Frequency Provider Last Rate Last Dose    sodium chloride (NS) flush 5-10 mL  5-10 mL IntraVENous PRN Nighat Hewitt MD        vancomycin (VANCOCIN) 1,000 mg in 0.9% sodium chloride (MBP/ADV) 250 mL adv  1,000 mg IntraVENous ONCE Nighat Hewitt MD        [START ON 12/16/2017] levoFLOXacin (LEVAQUIN) 500 mg in D5W IVPB  500 mg IntraVENous Q48H Nighat Hewitt MD        cefepime (MAXIPIME) 1 g in sterile water (preservative free) 10 mL IV syringe  1 g IntraVENous Q24H Nighat Hewitt MD        sodium chloride 0.9 % bolus infusion 1,497 mL  30 mL/kg IntraVENous ONCE Nighat Hewitt MD        dextrose 5 % - 0.45% NaCl infusion  125 mL/hr IntraVENous CONTINUOUS Nighat Hewitt MD         Current Outpatient Prescriptions   Medication Sig Dispense Refill    aspirin delayed-release 81 mg tablet Take 1 Tab by mouth daily. 30 Tab 0    felodipine (PLENDIL SR) 10 mg 24 hr tablet Take 1 Tab by mouth daily.  30 Tab 1    hydroCHLOROthiazide (HYDRODIURIL) 25 mg tablet Take 1 Tab by mouth daily. 30 Tab 0    lisinopril (PRINIVIL, ZESTRIL) 10 mg tablet Take 1 Tab by mouth daily. 30 Tab 0       Past History     Past Medical History:  Past Medical History:   Diagnosis Date    Diabetes ([address]     Hypertension        Past Surgical History:  Past Surgical History:   Procedure Laterality Date    NEUROLOGICAL PROCEDURE UNLISTED         Family History:  No family history on file. Social History:  Social History   Substance Use Topics    Smoking status: Current Every Day Smoker     Packs/day: 0.25    Smokeless tobacco: Never Used    Alcohol use No       Allergies:  No Known Allergies      Review of Systems       Review of Systems   Unable to perform ROS: Acuity of condition         Physical Exam     Visit Vitals    BP 91/65    Pulse (!) 55    Temp (!) 87.5 °F (30.8 °C)    Resp 11    Wt 49.9 kg (110 lb)    SpO2 (!) 61%    BMI 18.3 kg/m2         Physical Exam   Constitutional: He appears well-developed and well-nourished. He appears lethargic. He appears cachectic. He is cooperative. He has a sickly appearance. He appears ill. He appears distressed. Moaning. Unable to answer any questions. Confused. HENT:   Head: Normocephalic and atraumatic. Mouth/Throat: Oropharynx is clear and moist. Mucous membranes are dry. No oropharyngeal exudate. Eyes: Conjunctivae and EOM are normal. Right eye exhibits no discharge. Left eye exhibits no discharge. No scleral icterus. Neck: Normal range of motion and phonation normal. Neck supple. No JVD present. No thyromegaly present. Cardiovascular: Regular rhythm, S1 normal, S2 normal, normal heart sounds and intact distal pulses. Tachycardia present. Exam reveals no gallop and no friction rub. No murmur heard. Pulmonary/Chest: Effort normal. No accessory muscle usage. No respiratory distress. He has no wheezes. He has no rhonchi. He has no rales. Shallow breaths.    Mildly increased respiratory rate.    Abdominal: Soft. Normal appearance and bowel sounds are normal. He exhibits no distension and no pulsatile midline mass. There is no tenderness. There is no rebound and no guarding. Genitourinary: Rectal exam shows guaiac positive stool. Genitourinary Comments: Small pink prolapsed rectum that is easily reduced. Red stool. Musculoskeletal: Normal range of motion. He exhibits no edema or deformity. No lower extremity edema. Lymphadenopathy:        Head (right side): No submandibular adenopathy present. He has no cervical adenopathy. Neurological: He appears lethargic. Moves all extremities. No obvious focal deficits or facial asymmetry. Skin: Skin is warm and dry. No rash noted. Psychiatric: He is noncommunicative. Nursing note and vitals reviewed.         Diagnostic Study Results     Labs -  Recent Results (from the past 12 hour(s))   EKG, 12 LEAD, INITIAL    Collection Time: 12/14/17  4:58 PM   Result Value Ref Range    Ventricular Rate 55 BPM    Atrial Rate 55 BPM    P-R Interval 218 ms    QRS Duration 96 ms    Q-T Interval 516 ms    QTC Calculation (Bezet) 493 ms    Calculated P Axis 76 degrees    Calculated R Axis -4 degrees    Calculated T Axis 85 degrees    Diagnosis       Sinus bradycardia with 1st degree AV block  Moderate voltage criteria for LVH, may be normal variant  ST elevation, consider early repolarization, pericarditis, or injury  Nonspecific T wave abnormality  Prolonged QT  Abnormal ECG  When compared with ECG of 25-NOV-2017 17:42,  premature ventricular complexes are no longer present  RI interval has increased  ST elevation now present in Anterolateral leads     GLUCOSE, POC    Collection Time: 12/14/17  4:59 PM   Result Value Ref Range    Glucose (POC) 119 (H) 70 - 110 mg/dL   CBC WITH AUTOMATED DIFF    Collection Time: 12/14/17  5:06 PM   Result Value Ref Range    WBC 4.2 (L) 4.6 - 13.2 K/uL    RBC 1.84 (L) 4.70 - 5.50 M/uL    HGB 6.3 (L) 13.0 - 16.0 g/dL HCT 18.2 (L) 36.0 - 48.0 %    MCV 98.9 (H) 74.0 - 97.0 FL    MCH 34.2 (H) 24.0 - 34.0 PG    MCHC 34.6 31.0 - 37.0 g/dL    RDW 19.0 (H) 11.6 - 14.5 %    PLATELET 134 (L) 295 - 420 K/uL    MPV 9.9 9.2 - 11.8 FL    NEUTROPHILS 61 40 - 73 %    LYMPHOCYTES 35 21 - 52 %    MONOCYTES 4 3 - 10 %    EOSINOPHILS 0 0 - 5 %    BASOPHILS 0 0 - 2 %    ABS. NEUTROPHILS 2.6 1.8 - 8.0 K/UL    ABS. LYMPHOCYTES 1.5 0.9 - 3.6 K/UL    ABS. MONOCYTES 0.2 0.05 - 1.2 K/UL    ABS. EOSINOPHILS 0.0 0.0 - 0.4 K/UL    ABS. BASOPHILS 0.0 0.0 - 0.1 K/UL    DF AUTOMATED     METABOLIC PANEL, COMPREHENSIVE    Collection Time: 12/14/17  5:06 PM   Result Value Ref Range    Sodium 143 136 - 145 mmol/L    Potassium 5.0 3.5 - 5.5 mmol/L    Chloride 106 100 - 108 mmol/L    CO2 16 (L) 21 - 32 mmol/L    Anion gap 21 (H) 3.0 - 18 mmol/L    Glucose 39 (LL) 74 - 99 mg/dL     (H) 7.0 - 18 MG/DL    Creatinine 4.45 (H) 0.6 - 1.3 MG/DL    BUN/Creatinine ratio 47 (H) 12 - 20      GFR est AA 16 (L) >60 ml/min/1.73m2    GFR est non-AA 13 (L) >60 ml/min/1.73m2    Calcium 7.1 (L) 8.5 - 10.1 MG/DL    Bilirubin, total 2.3 (H) 0.2 - 1.0 MG/DL    ALT (SGPT) 151 (H) 16 - 61 U/L    AST (SGOT) 298 (H) 15 - 37 U/L    Alk.  phosphatase 467 (H) 45 - 117 U/L    Protein, total 4.7 (L) 6.4 - 8.2 g/dL    Albumin 2.1 (L) 3.4 - 5.0 g/dL    Globulin 2.6 2.0 - 4.0 g/dL    A-G Ratio 0.8 0.8 - 1.7     CARDIAC PANEL,(CK, CKMB & TROPONIN)    Collection Time: 12/14/17  5:06 PM   Result Value Ref Range    CK PENDING U/L    CK - MB 40.7 (H) <3.6 ng/ml    CK-MB Index PENDING %    Troponin-I, Qt. 0.50 (H) 0.0 - 0.045 NG/ML   PROTHROMBIN TIME + INR    Collection Time: 12/14/17  5:06 PM   Result Value Ref Range    Prothrombin time 19.5 (H) 11.5 - 15.2 sec    INR 1.7 (H) 0.8 - 1.2     PTT    Collection Time: 12/14/17  5:06 PM   Result Value Ref Range    aPTT 36.3 23.0 - 36.4 SEC   EKG, 12 LEAD, SUBSEQUENT    Collection Time: 12/14/17  5:07 PM   Result Value Ref Range    Ventricular Rate 56 BPM    Atrial Rate 56 BPM    P-R Interval 210 ms    QRS Duration 96 ms    Q-T Interval 506 ms    QTC Calculation (Bezet) 488 ms    Calculated P Axis 74 degrees    Calculated R Axis 2 degrees    Calculated T Axis 86 degrees    Diagnosis       Sinus bradycardia with 1st degree AV block  Moderate voltage criteria for LVH, may be normal variant  ST elevation, consider early repolarization, pericarditis, or injury  Nonspecific T wave abnormality  Prolonged QT  Abnormal ECG  When compared with ECG of 14-DEC-2017 16:58,  No significant change was found     POC TROPONIN-I    Collection Time: 12/14/17  5:10 PM   Result Value Ref Range    Troponin-I (POC) 0.54 (HH) 0.00 - 0.08 ng/mL   POC LACTIC ACID    Collection Time: 12/14/17  5:17 PM   Result Value Ref Range    Lactic Acid (POC) 4.9 (HH) 0.4 - 2.0 mmol/L   GLUCOSE, POC    Collection Time: 12/14/17  6:15 PM   Result Value Ref Range    Glucose (POC) 162 (H) 70 - 110 mg/dL       Radiologic Studies -   Xr Chest Port    Result Date: 12/14/2017  EXAMINATION: Chest portable INDICATION: STEMI COMPARISON: 11/25/2017 FINDINGS: Frontal view of the chest obtained. Prominent perihilar and basilar interstitial markings. No confluent consolidation. Elevated left hemidiaphragm. Borderline cardiac silhouette. Possible small left pleural effusion. Aortic arch calcifications. Right subclavian line, tip at mid SVC level. No pneumothorax identified. No acute osseous findings. IMPRESSION: 1. Interstitial infiltrates/edema and borderline cardiac silhouette size. Possible small left pleural effusion. Medical Decision Making   I am the first provider for this patient. I reviewed the vital signs, available nursing notes, past medical history, past surgical history, family history and social history. Vital Signs-Reviewed the patient's vital signs.     Pulse Oximetry Analysis -  4L of O2 via NC (Interpretation)    Cardiac Monitor:  Rate:   Rhythm:  Normal Sinus Rhythm EKG:  Rhythm: Sinus Bradycardia   Rate: 56 bpm  Interpretation: 1st degree AV block, QT of 506 ms, STEMI   EKG interpret by Matthew Mccoy MD 5:08 PM       Records Reviewed: Nursing Notes, Old Medical Records and Previous electrocardiograms (Time of Review: 6:01 PM)    Provider Notes (Medical Decision Making):       ASSESSMENT / PLAN:   71y/o AAM, PMHx of failure to thrive, CKD (Cre ~2.0), chronic anemia(~9), Possible cirrhosis w/liver mass yet to be identified, HFpEF, recent admission for failure to thirve, nursing home resident brought by EMS from nursing home for general weakness for past few days. On arrival, thin/cachextic man, moaning, altered, dry mucus membranes, lungs clear, abd scaffoid, prolapsed/reducible rectum, hemmocult positive. BP ~42 systolic, HR 12'V, RR [id number]'O shallow, rectal temp 87.0 (extremely hypothermic). EKG w/STEMI in Lateral Fields. Labs from nursing home today show Hgb 6.5, Cre 4.0. STEMI alert called, but after discussion w/cath lab physician determined likely other causes leading to demand ischemia and not a cath cantidate. DDx includes anemia, septic shock, uremia, terrence, electrolyte disturbance. Immediately started 2 peripheral IV's and 2 liters NS bolus. Min repsonse to IVF, placed R.subclavian Triple Lumen Cath. BP ~02 stystolic now. Labs here w/anemia 6.0 hemoglobin, , Cre4.4, Trop 0.50. No UOP. CXR w/clear lungs, TLC in place, no ptx. Sepsis Alerted. BC x 2, Ucath/UA/cx, Cefipime and Vancomycin. Placed Litzy Hugger and warm blankets. Spoke with Dr. Dasia Hurtado (his PCP) who will admit to ICU. Spoke to Dr. Atilio Cervantes (intensivist) as well. Dr. Dasia Hurtado confirmed w/family pt is Full Code. Condition Critical.       Matthew Mccoy MD  EM-IM Physician        ED Course: Progress Notes, Reevaluation, and Consults:  6:49 PM Consult: I discussed care with Dr. Atilio Cervantes (Intensivist).  It was a standard discussion including patient history, chief complaint, available diagnostic results, and predicted treatment course. Dr. Santiago Reddy agrees to the plan and will admit the patient. Procedures: Central Line  Date/Time: 12/14/2017 6:19 PM  Performed by: Kelli Klinefelter  Authorized by: Kelli Klinefelter     Consent:     Consent obtained:  Emergent situation    Consent given by:  Healthcare agent    Risks discussed:  Arterial puncture, bleeding, infection, incorrect placement, nerve damage and pneumothorax    Alternatives discussed:  Delayed treatment and alternative treatment  Universal protocol:     Procedure explained and questions answered to patient or proxy's satisfaction: yes      Relevant documents present and verified: yes      Test results available and properly labeled: yes      Imaging studies available: yes      Required blood products, implants, devices, and special equipment available: yes      Site/side marked: yes      Immediately prior to procedure, a time out was called: yes      Patient identity confirmed:  Arm band  Pre-procedure details:     Hand hygiene: Hand hygiene performed prior to insertion      Sterile barrier technique: All elements of maximal sterile technique followed      Skin preparation:  ChloraPrep    Skin preparation agent: Skin preparation agent completely dried prior to procedure    Procedure details:     Location:  R subclavian    Patient position:  Trendelenburg    Procedural supplies:  Triple lumen    Catheter size:  7.5 Fr    Landmarks identified: yes      Ultrasound guidance: no      Number of attempts:  1    Successful placement: yes    Post-procedure details:     Post-procedure:  Line sutured and dressing applied (Biopatch)    Assessment:  Blood return through all ports, free fluid flow, no pneumothorax on x-ray and placement verified by x-ray    Patient tolerance of procedure:   Tolerated well, no immediate complications        CRITICAL CARE:  6:17 PM  I have spent 40 minutes of critical care time involved in lab review, consultations with specialist, family decision-making, and documentation. During this entire length of time I was immediately available to the patient. Critical Care: The reason for providing this level of medical care for this critically ill patient was due a critical illness that impaired one or more vital organ systems such that there was a high probability of imminent or life threatening deterioration in the patients condition. This care involved high complexity decision making to assess, manipulate, and support vital system functions, to treat this degreee vital organ system failure and to prevent further life threatening deterioration of the patients condition. For Hospitalized Patients:    1. Hospitalization Decision Time:  The decision to hospitalize the patient was made by Dr. Nick Marcial at 1800 on 12/14/2017    2. Aspirin: Aspirin was given Rectally    Diagnosis     Clinical Impression: Septic Shock, Anemia, Hypothermia, STEMI, Uremia, Renal Failure    Disposition: Admit ICU    Follow-up Information     None           Patient's Medications   Start Taking    No medications on file   Continue Taking    ASPIRIN DELAYED-RELEASE 81 MG TABLET    Take 1 Tab by mouth daily. FELODIPINE (PLENDIL SR) 10 MG 24 HR TABLET    Take 1 Tab by mouth daily. HYDROCHLOROTHIAZIDE (HYDRODIURIL) 25 MG TABLET    Take 1 Tab by mouth daily. LISINOPRIL (PRINIVIL, ZESTRIL) 10 MG TABLET    Take 1 Tab by mouth daily.    These Medications have changed    No medications on file   Stop Taking    No medications on file     _______________________________    Attestations:  [address] acting as a scribe for and in the presence of Nadia Rinne, MD      December 14, 2017 at 6:01 PM       Provider Attestation:      I personally performed the services described in the documentation, reviewed the documentation, as recorded by the scribe in my presence, and it accurately and completely records my words and actions.  December 14, 2017 at 6:01 MARCY Yost MD    _______________________________

## 2017-12-14 NOTE — PROGRESS NOTES
I responded to a Code STEMI for Mr. Sigala. We are awaiting the arrival of his sister. I will check back once she arrives.      [address], M.Div, CPE Resident   Pager: [phone]  Phone: [phone]

## 2017-12-15 NOTE — ROUTINE PROCESS
Bedside, Verbal and Written shift change report given to edson ALATORRE RN (oncoming nurse) by Smitha Chen RN (offgoing nurse). Report included the following information SBAR, Kardex, ED Summary, OR Summary, Procedure Summary, Intake/Output, MAR, Accordion, Recent Results and Med Rec Status.

## 2017-12-15 NOTE — CONSULTS
[facility]   [email or web address]      Impression:   1. Hep B S Ag positive. Viral load and hep B s ab pending. Liver mass biopsied last month -negative for malignancy though does not rule out. Cirrhosis of liver. Heme positive with anemia which is multifactorial. Doubt GI bleed given brown stool. Hypotension related to sepsis  More than likely sepsis source heart vs abdomen can explain anemia given the renal failure. Plan:     1. Agree with broad spectrum abx which will treat SBP if present at all though I do not suspect at this time. Hep B can be addressed later as well as the liver mass. Megace for appetite. Encourage oral intake. Start lactulose if has confusion. Consider NGT feeding with Dobhoff catheter. Chief Complaint: Asked to see patient for Liver problems      HPI:  Quan Falk is a 80 y.o. male who is being seen on consult for as above. Weakness hypotension fatigue and weight loss. Seen by our team last month. Has hep B. Hep B s ab and core IgG total is pending. Ascitic tap negative for sbp. Heme positive brown stool. PMH:   Past Medical History:   Diagnosis Date    Diabetes ([facility] [place] [address]     Hypertension        PSH:   Past Surgical History:   Procedure Laterality Date    NEUROLOGICAL PROCEDURE UNLISTED         Social HX:   Social History     Social History    Marital status: SINGLE     Spouse name: N/A    Number of children: N/A    Years of education: N/A     Occupational History    Not on file. Social History Main Topics    Smoking status: Current Every Day Smoker     Packs/day: 0.25    Smokeless tobacco: Never Used    Alcohol use No    Drug use: No    Sexual activity: No     Other Topics Concern    Not on file     Social History Narrative       FHX:   No family history on file.     Allergy:   No Known Allergies    Home Medications:     Prescriptions Prior to Admission   Medication Sig    felodipine (PLENDIL SR) 10 mg 24 hr tablet Take 1 Tab by mouth daily.  hydroCHLOROthiazide (HYDRODIURIL) 25 mg tablet Take 1 Tab by mouth daily.  lisinopril (PRINIVIL, ZESTRIL) 10 mg tablet Take 1 Tab by mouth daily.  aspirin delayed-release 81 mg tablet Take 1 Tab by mouth daily.        Medications:       Current Facility-Administered Medications:     vancomycin (VANCOCIN) 500 mg in 0.9% sodium chloride (MBP/ADV) 100 mL MBP, 500 mg, IntraVENous, Q48H, Geo Simon MD, Last Rate: 100 mL/hr at 12/15/17 0900, 500 mg at 12/15/17 0900    metroNIDAZOLE (FLAGYL) IVPB premix 500 mg, 500 mg, IntraVENous, Q6H, Berta Gandara PA-C, Last Rate: 100 mL/hr at 12/15/17 1115, 500 mg at 12/15/17 1115    albumin human 25% (BUMINATE) solution 25 g, 25 g, IntraVENous, ONCE, Suni Albarran MD    furosemide (LASIX) injection 60 mg, 60 mg, IntraVENous, ONCE, Suni Albarran MD    sodium chloride (NS) flush 5-10 mL, 5-10 mL, IntraVENous, PRN, Geo Simon MD    cefepime (MAXIPIME) 1 g in sterile water (preservative free) 10 mL IV syringe, 1 g, IntraVENous, Q24H, Geo Simon MD, 1 g at 12/14/17 1916    dextrose 5 % - 0.45% NaCl infusion, 125 mL/hr, IntraVENous, CONTINUOUS, Geo Simon MD, Last Rate: 125 mL/hr at 12/14/17 2152, 125 mL/hr at 12/14/17 2152    0.9% sodium chloride infusion 250 mL, 250 mL, IntraVENous, PRN, Gold Abraham MD    NOREPINephrine (LEVOPHED) 8 mg in 5% dextrose 250mL infusion, 2-16 mcg/min, IntraVENous, TITRATE, Brand Class, MD, Last Rate: 13.1 mL/hr at 12/15/17 1118, 7 mcg/min at 12/15/17 1118    VANCOMYCIN INFORMATION NOTE, , Other, Rx Dosing/Monitoring, Geo Simon MD    insulin lispro (HUMALOG) injection, , SubCUTAneous, Q6H, January-Sarah SORIANO PA-C, 2 Units at 12/15/17 0200    glucose chewable tablet 16 g, 4 Tab, Oral, PRN, January-Sarah SORIANO PA-C    glucagon (GLUCAGEN) injection 1 mg, 1 mg, IntraMUSCular, PRN, January-Sarah SORIANO PA-C    dextrose (D50W) injection syrg 12.5-25 g, 25-50 mL, IntraVENous, PRN, January-Sarah SORIANO PA-C    famotidine (PF) (PEPCID) 20 mg in sodium chloride 0.9 % 10 mL injection, 20 mg, IntraVENous, DAILY, January-Sarah SORIANO PA-C, 20 mg at 12/15/17 0900      Review of Systems:     Constitutional: Has weight loss, fatigue. Skin: No rashes, pruritis, jaundice,    HENT: No headache   Eyes: unclear   Cardiovascular: No chest pain, heart palpitations. Respiratory:  shortness of breath   Gastrointestinal: Abdominal pain and anorexia   Genitourinary: No dysuria or hematuria. Musculoskeletal: No weakness, arthralgias, wasting. Endo: No sweats. Heme: No bruising, easy bleeding. Allergies: As noted. Neurological: Alert and oriented. Gait not assessed. Psychiatric:  No mood swings       Physical Assessment:     Visit Vitals    /75    Pulse 96    Temp 97.3 °F (36.3 °C)    Resp 26    Ht 5' 7\" (1.702 m)    Wt 49.9 kg (110 lb)    SpO2 99%    BMI 17.23 kg/m2       constitutional: appearance: well developed, poorly nourished. skin: inspection:reduced skin Turgor . eyes: inspection: yellow sclerae. ENMT: mouth: poor dentition,  neck: Supple thyroid: not enlarged with normal trachea, no masses No JVD  respiratory: effort: normal air entry; good air movement and clear to auscultation. cardiovascular: soft sounds   abdominal: abdomen:mildy tender no peritoneal signs  rectal: hemoccult/guaiac: not performed. musculoskeletal: diffuse wasting  gait: not examined. lymphatic: No adenopathy in the neck. neurologic: alert and moves all extremities  psychiatric:  mood and affect: Normal affect.        Basic Metabolic Profile   Recent Labs      12/15/17   0435   NA  142   K  4.8   CL  109*   CO2  14*   BUN  194*   GLU  123*   CA  6.6*   MG  2.8*   PHOS  9.3*         CBC w/Diff    Recent Labs      12/15/17   0435   WBC  3.4*   RBC  3.40*   HGB  10.7*   HCT  30.9*   MCV  90.9   MCH  31.5   MCHC  34.6   RDW  17.7*   PLT  82*    Recent Labs      12/15/17   0435   GRANS  75   LYMPH  10*   EOS  0        Hepatic Function   Recent Labs      12/15/17   0435   ALB  1.9*   TP  4.6*   TBILI  2.7*   SGOT  359*   AP  533*          Danya Johnson MD, M.D. [facility] 1945, [address] [facility]  [email or web address]

## 2017-12-15 NOTE — CONSULTS
Infectious Disease Consultation Note    Requested by: dr. Linda Coyne    Reason: endocarditis, septic shock    Current abx Prior abx   Ceftriaxone, metronidazole, vancomycin since 12/14/17      Lines:       Assessment :    80 y.o. male with h/o type 2 Dm (last hgbA1c 5.9 on 11/26/17), cirrhosis, chronic renal failure admitted to [facility] on 12/14/17 with hypotension, hypothermia. Clinical presentation concerning for septic shock (POA). Exact etiology unclear. History and exam concerning for cholangitis, intra abdominal source. Patient could have hepatic malignancy and mitral valve vegetation could be marantic endocarditis due to malignancy. Will follow up blood cultures to r/o infective endocarditis. Recommendations:    1. D/c cefepime. Start ceftriaxone. Continue metronidazole, vancomycin  2. F/u ct abdomen  3. Agree with repeat 2 d echo  4. F/u blood cultures      Advance Care planning: full code: discussed  with patient/surrogate decision maker:Tabitha Paz: [phone]    Thank you for consultation request. Above plan was discussed in details with patient, and dr Linda Coyne. Cc time spent > 45 min. Please call me if any further questions or concerns. Will continue to participate in the care of this patient. HPI:    80 y.o. male with h/o type 2 Dm (last hgbA1c 5.9 on 11/26/17), cirrhosis, chronic renal failure admitted to [facility] on 12/14/17 with hypotension, hypothermia. Looking back at his history, Patient had ascites and liver mass but pathology negative. Patient had been transferred to a SNF for therapy. Patient had altered mental status on 12/13 and noted to be more lethargic on 12/14/17. Blood work was done and patient with  so sent to the ER. Patient was admitted for  anemia and evidence of possible sepsis with hypotension and hypothermia. Admitted to ICU started on pressors. I have been consulted for further recommendations.  Review of echo from 11/25/17 revealed mobile mitral valve vegetation. No further intervention was carried out at that time.      Of note, patient had paracentesis 11/29 which revealed 75 nucleated cells, no organisms. Patient states that he has not been feeling good for the last few days. C/o abdominal pain. No headaches, cough, increased sob. Patient denies headaches, visual disturbances, sore throat, runny nose, earaches,  chills, abdominal pain, diarrhea, burning micturition, pain or weakness in extremities. He denies back pain/flank pain. Past Medical History:   Diagnosis Date    Diabetes ([address]     Hypertension        Past Surgical History:   Procedure Laterality Date    NEUROLOGICAL PROCEDURE UNLISTED         Home Medication List    Details   felodipine (PLENDIL SR) 10 mg 24 hr tablet Take 1 Tab by mouth daily. Qty: 30 Tab, Refills: 1      hydroCHLOROthiazide (HYDRODIURIL) 25 mg tablet Take 1 Tab by mouth daily. Qty: 30 Tab, Refills: 0      lisinopril (PRINIVIL, ZESTRIL) 10 mg tablet Take 1 Tab by mouth daily. Qty: 30 Tab, Refills: 0      aspirin delayed-release 81 mg tablet Take 1 Tab by mouth daily.   Qty: 30 Tab, Refills: 0             Current Facility-Administered Medications   Medication Dose Route Frequency    vancomycin (VANCOCIN) 500 mg in 0.9% sodium chloride (MBP/ADV) 100 mL MBP  500 mg IntraVENous Q48H    metroNIDAZOLE (FLAGYL) IVPB premix 500 mg  500 mg IntraVENous Q6H    sodium chloride (NS) flush 5-10 mL  5-10 mL IntraVENous PRN    cefepime (MAXIPIME) 1 g in sterile water (preservative free) 10 mL IV syringe  1 g IntraVENous Q24H    dextrose 5 % - 0.45% NaCl infusion  125 mL/hr IntraVENous CONTINUOUS    0.9% sodium chloride infusion 250 mL  250 mL IntraVENous PRN    NOREPINephrine (LEVOPHED) 8 mg in 5% dextrose 250mL infusion  2-16 mcg/min IntraVENous TITRATE    VANCOMYCIN INFORMATION NOTE   Other Rx Dosing/Monitoring    insulin lispro (HUMALOG) injection   SubCUTAneous Q6H    glucose chewable tablet 16 g  4 Tab Oral PRN    glucagon (GLUCAGEN) injection 1 mg  1 mg IntraMUSCular PRN    dextrose (D50W) injection syrg 12.5-25 g  25-50 mL IntraVENous PRN    famotidine (PF) (PEPCID) 20 mg in sodium chloride 0.9 % 10 mL injection  20 mg IntraVENous DAILY       Allergies: Review of patient's allergies indicates no known allergies. No family history on file. Social History     Social History    Marital status: SINGLE     Spouse name: N/A    Number of children: N/A    Years of education: N/A     Occupational History    Not on file. Social History Main Topics    Smoking status: Current Every Day Smoker     Packs/day: 0.25    Smokeless tobacco: Never Used    Alcohol use No    Drug use: No    Sexual activity: No     Other Topics Concern    Not on file     Social History Narrative     History   Smoking Status    Current Every Day Smoker    Packs/day: 0.25   Smokeless Tobacco    Never Used        Temp (24hrs), Av.5 °F (34.2 °C), Min:86 °F (30 °C), Max:97.3 °F (36.3 °C)    Visit Vitals    /78    Pulse 85    Temp 97.3 °F (36.3 °C)    Resp 19    Ht 5' 7\" (1.702 m)    Wt 49.9 kg (110 lb)    SpO2 93%    BMI 17.23 kg/m2       ROS: 12 point ROS obtained in details. Pertinent positives as mentioned in HPI,   otherwise negative    Physical Exam:    General: Well developed, well nourished male laying on the bed AAOx3 in no acute distress. General:   awake alert and oriented   HEENT:  Normocephalic, atraumatic, PERRL, EOMI, no scleral icterus or pallor; no conjunctival hemmohage;  nasal and oral mucous are moist and without evidence of lesions. No thrush. Neck supple, no bruits. Lymph Nodes:   no cervical, axillary or inguinal adenopathy   Lungs:   non-labored, bilaterally clear to auscultation- no crackles wheezes rales or rhonchi   Heart:  RRR, s1 and s2; systolic murmur at apex, no rubs or gallops, no edema, + pedal pulses   Abdomen:  soft, distended, active bowel sounds, no hepatomegaly, no splenomegaly. Diffusely tender. Genitourinary:  deferred   Extremities:   no clubbing, cyanosis; no joint effusions or swelling; Full ROM of all large joints to the upper and lower extremities; muscle mass appropriate for age   Neurologic:  No gross focal sensory abnormalities; 5/5 muscle strength to upper and lower extremities. Speech appropriate.  Cranial nerves intact                        Skin:  No rash or ulcers noted   Back:  no spinal or paraspinal muscle tenderness or rigidity, no CVA tenderness     Psychiatric:  No suicidal or homicidal ideations, appropriate mood and affect         Labs: Results:   Chemistry Recent Labs      12/15/17   0435  12/14/17   2149  12/14/17   1706   GLU  123*  154*  39*   NA  142  143  143   K  4.8  4.8  5.0   CL  109*  110*  106   CO2  14*  14*  16*   BUN  194*  188*  210*   CREA  3.90*  4.05*  4.45*   CA  6.6*  6.2*  7.1*   AGAP  19*  19*  21*   BUCR  50*  46*  47*   AP  533*  421*  467*   TP  4.6*  3.9*  4.7*   ALB  1.9*  1.7*  2.1*   GLOB  2.7  2.2  2.6   AGRAT  0.7*  0.8  0.8      CBC w/Diff Recent Labs      12/15/17   0435  12/14/17   2149  12/14/17   1706   WBC  3.4*  3.4*  4.2*   RBC  3.40*  1.57*  1.84*   HGB  10.7*  5.4*  6.3*   HCT  30.9*  15.8*  18.2*   PLT  82*  92*  125*   GRANS  75  67  61   LYMPH  10*  32  35   EOS  0  0  0      Microbiology Recent Labs      12/14/17   1856  12/14/17   1730  12/14/17   1715   CULT  CULTURE IN PROGRESS,FURTHER UPDATES TO FOLLOW  NO GROWTH AFTER 12 HOURS  NO GROWTH AFTER 12 HOURS          RADIOLOGY:    All available imaging studies/reports in [facility] for this admission were reviewed    Dr. Jessica Meadows, Infectious Disease Specialist  [phone]  December 15, 2017  5:00 PM

## 2017-12-15 NOTE — PROGRESS NOTES
NUTRITION    Nursing Referral: [facility]     RECOMMENDATIONS / PLAN:     - Add supplements: Glucerna Shake TID.    - Monitor po intake and diet tolerance. Consider NGT placement to provide supplemental enteral nutrition if patient unable to tolerate adequate po intake. - Continue IV fluid until patient able to tolerate adequate fluid intake enterally. - Continue RD inpatient monitoring and evaluation. NUTRITION INTERVENTIONS & DIAGNOSIS:     [x] Meals/Snacks: modified diet  [x] Medical food supplementation: initiate   [x] IV fluid: D5 1/2NS at 125 mL/hr (150 gm dextrose, 510 kcal per day)   [x] Coordination of care: interdisciplinary rounds     Nutrition Diagnosis: Underweight related to inadequate energy intake as evidenced by BMI of 17 kg/m^2. ASSESSMENT:     Pt not interested in clear liquids this morning, lethargic and unable to report weight or appetite history. Advanced to soft solid diet this afternoon. Average po intake adequate to meet patients estimated nutritional needs:   [] Yes     [x] No   [] Unable to determine at this time    Diet: DIET CLEAR LIQUID      Food Allergies: NKFA  Current Appetite:   [] Good     [] Fair     [x] Poor     [] Other:   Appetite/meal intake prior to admission:   [] Good    [] Fair     [] Poor     [x] Other: unknown   Feeding Limitations:  [] Swallowing difficulty    [] Chewing difficulty    [] Other:  Current Meal Intake: No data found. 0% of breakfast on 12/15.      BM: 12/15  Skin Integrity: WDL  Edema: none   Pertinent Medications: Reviewed: SSI, levophed, calcium    Recent Labs      12/15/17   0435  12/14/17   2149  12/14/17   1706   NA  142  143  143   K  4.8  4.8  5.0   CL  109*  110*  106   CO2  14*  14*  16*   GLU  123*  154*  39*   BUN  194*  188*  210*   CREA  3.90*  4.05*  4.45*   CA  6.6*  6.2*  7.1*   MG  2.8*   --    --    PHOS  9.3*   --    --    ALB  1.9*  1.7*  2.1*   SGOT  359*  313*  298*   ALT  180*  147*  151*       Intake/Output Summary (Last 24 hours) at 12/15/17 0942  Last data filed at 12/15/17 0900   Gross per 24 hour   Intake          1015.03 ml   Output              465 ml   Net           550.03 ml       Anthropometrics:  Ht Readings from Last 1 Encounters:   12/14/17 5' 7\" (1.702 m)     Last 3 Recorded Weights in this Encounter    12/14/17 1819   Weight: 49.9 kg (110 lb)     Body mass index is 17.23 kg/(m^2). Underweight     Weight History: weight loss over the past year per RN; 30 lb, 21% weight loss x 5 years per chart       Weight Metrics 12/14/2017 12/1/2017 11/23/2017 11/18/2017 4/27/2017 8/24/2012   Weight 110 lb 107 lb 8 oz 105 lb 100 lb 113 lb 140 lb   BMI 17.23 kg/m2 17.89 kg/m2 17.47 kg/m2 16.64 kg/m2 18.8 kg/m2 23.3 kg/m2        Admitting Diagnosis: Septic shock (HCC)  Anemia  STEMI (ST elevation myocardial infarction) ([facility] [address]  Pertinent PMHx: DM, HTN, cirrhosis     Education Needs:        [x] None identified  [] Identified - Not appropriate at this time  []  Identified and addressed - refer to education log  Learning Limitations:   [] None identified  [x] Identified: lethargic, confused     Cultural, Yazidism & ethnic food preferences:  [x] None identified    [] Identified and addressed     ESTIMATED NUTRITION NEEDS:     Calories: 9329-1214 kcal (25-30 kcal/kg) based on  [] Actual BW      [x] SBW 69 kg  Protein: 55-83 gm (0.8-1.2 gm/kg) based on  [] Actual BW      [x] SBW   Fluid: 1 mL/kcal     MONITORING & EVALUATION:     Nutrition Goal(s):   1. Po intake of meals will meet >75% of patient estimated nutritional needs within the next 7 days.   Outcome:  [] Met/Ongoing    []  Not Met    [x] New/Initial Goal     Monitoring:   [x] Diet tolerance   [x] Meal intake   [] Supplement intake   [] GI symptoms/ability to tolerate po diet   [] Respiratory status   [] Plan of care      Previous Recommendations (for follow-up assessments only):     []   Implemented       []   Not Implemented (RD to address)      [] No Longer Appropriate     [] No Recommendation Made     Discharge Planning: cardiac, diabetic diet   [x] Participated in care planning, discharge planning, & interdisciplinary rounds as appropriate      Edgar Massey, [address], [address] [place] [address]    Pager: [phone]

## 2017-12-15 NOTE — PROGRESS NOTES
attended the interdisciplinary rounds for Joseph Casillas, who is a 80 y.o.,male. Patients Primary Language is: [place]. According to the patients EMR Jain Affiliation is: [facility].     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  recommends bedside caregivers page  on duty if patient shows signs of acute spiritual or emotional distress.     [facility]  Board Certified [address] [place] [address]   [phone]

## 2017-12-15 NOTE — PROGRESS NOTES
Presented with hypotension, hypothermic, elevated lactic acid, acute on chronic renal failure, worsening anemia, chronic elevated LFT's. Consulted nephrology, GI, cardiology, and infectious disease. Discussed with Dr. Salomnó David, attempting to place Palliative Care Consult. Dr. Latanya Vaughn) discussed with Lux Lozano), possible need for dialysis. CT abd/pelvis pending. Cardiology has evaluated patient, reviewed previous echo and concern for endocarditis, plan for repeat echo (possible need for KRISTINE if improves hemodynamically). 4:52 PM  Discussed with MsJohn Dinesh Chawla), patients niece. She has been trying to get in touch with patients sister, who was a previous nurse to help determine the agressiveness of medical care. I explained the critical nature of the patient's condition and high mortality rate. Ms. Landry Parks would like to hold off on dialysis at this time until discussion with family, patient is a FULL CODE. According to Dr. Salomón David there has been difficulty placing palliative care consult, this should be addressed with nursing supervisor. Riki Bower PA-C          I spoke with family this evening about status and poor prognosis. They have decided to continue current care but DNR/DNI at this time. They have agreed to not pursue dialysis for the time being. If he survives through the night, then the issue of HD can be revisited in the am.    ANDRZEJ Garvin

## 2017-12-15 NOTE — PROGRESS NOTES
I spoke briefly with Mr. Gregg and with his nurse. His sister has not yet arrived. I encouraged the ED staff to page me if they needed me.      [address], M.Div, CPE Resident   Pager: [phone]  Phone: [phone]

## 2017-12-15 NOTE — CONSULTS
Consult Note  Consult requested by: Dr. Mookie Gonzales is a 80 y.o. male [address] who is being seen on consult for ROXANNE/CKD  Chief Complaint   Patient presents with    Lethargy     Admission diagnosis: <principal problem not specified>     HPI: 81 yo AA male admitted from the NH last night for altered mental status. Pt was recently d/c from [facility] on 12/1 with a serum BUN crea of 74/2. 12. On admission he was found to be severely hypotensive, hypothermic, azotemic and anemic. He has been managed by the ICU team for septic shock has gotten PRBC and remains on IVF, antibiotics and vasopressor. BP and mentation has improved but urine out put has remain poor. Some improvement in renal function noted but consult was called due to persistently elevated BUN/Crea. Reviewed recent admission. Pt c/o of abd pain and choking sensation. Also c/o of CP and some SOB. Past Medical History:   Diagnosis Date    Diabetes ([facility] [address]     Hypertension       Past Surgical History:   Procedure Laterality Date    NEUROLOGICAL PROCEDURE UNLISTED         Social History     Social History    Marital status: SINGLE     Spouse name: N/A    Number of children: N/A    Years of education: N/A     Occupational History    Not on file. Social History Main Topics    Smoking status: Current Every Day Smoker     Packs/day: 0.25    Smokeless tobacco: Never Used    Alcohol use No    Drug use: No    Sexual activity: No     Other Topics Concern    Not on file     Social History Narrative       No family history on file. No Known Allergies     Home Medications:     Prior to Admission Medications   Prescriptions Last Dose Informant Patient Reported? Taking?   aspirin delayed-release 81 mg tablet Unknown at Unknown time  No No   Sig: Take 1 Tab by mouth daily. felodipine (PLENDIL SR) 10 mg 24 hr tablet 12/14/2017 at Unknown time  No Yes   Sig: Take 1 Tab by mouth daily.    hydroCHLOROthiazide (HYDRODIURIL) 25 mg tablet 12/14/2017 at unknown  No Yes   Sig: Take 1 Tab by mouth daily. lisinopril (PRINIVIL, ZESTRIL) 10 mg tablet 12/14/2017  No Yes   Sig: Take 1 Tab by mouth daily. Facility-Administered Medications: None       Current Facility-Administered Medications   Medication Dose Route Frequency    vancomycin (VANCOCIN) 500 mg in 0.9% sodium chloride (MBP/ADV) 100 mL MBP  500 mg IntraVENous Q48H    metroNIDAZOLE (FLAGYL) IVPB premix 500 mg  500 mg IntraVENous Q6H    sodium chloride (NS) flush 5-10 mL  5-10 mL IntraVENous PRN    cefepime (MAXIPIME) 1 g in sterile water (preservative free) 10 mL IV syringe  1 g IntraVENous Q24H    dextrose 5 % - 0.45% NaCl infusion  125 mL/hr IntraVENous CONTINUOUS    0.9% sodium chloride infusion 250 mL  250 mL IntraVENous PRN    NOREPINephrine (LEVOPHED) 8 mg in 5% dextrose 250mL infusion  2-16 mcg/min IntraVENous TITRATE    VANCOMYCIN INFORMATION NOTE   Other Rx Dosing/Monitoring    insulin lispro (HUMALOG) injection   SubCUTAneous Q6H    glucose chewable tablet 16 g  4 Tab Oral PRN    glucagon (GLUCAGEN) injection 1 mg  1 mg IntraMUSCular PRN    dextrose (D50W) injection syrg 12.5-25 g  25-50 mL IntraVENous PRN    famotidine (PF) (PEPCID) 20 mg in sodium chloride 0.9 % 10 mL injection  20 mg IntraVENous DAILY       Review of Systems:   Review of systems not obtained due to patient factors.   Data Review:    Labs: Results:       Chemistry Recent Labs      12/15/17   0435  12/14/17   2149  12/14/17   1706   GLU  123*  154*  39*   NA  142  143  143   K  4.8  4.8  5.0   CL  109*  110*  106   CO2  14*  14*  16*   BUN  194*  188*  210*   CREA  3.90*  4.05*  4.45*   CA  6.6*  6.2*  7.1*   AGAP  19*  19*  21*   BUCR  50*  46*  47*   AP  533*  421*  467*   TP  4.6*  3.9*  4.7*   ALB  1.9*  1.7*  2.1*   GLOB  2.7  2.2  2.6   AGRAT  0.7*  0.8  0.8      CBC w/Diff Recent Labs      12/15/17   0435  12/14/17   2149  12/14/17   1706   WBC  3.4*  3.4*  4.2*   RBC  3.40*  1.57* 1.84*   HGB  10.7*  5.4*  6.3*   HCT  30.9*  15.8*  18.2*   PLT  82*  92*  125*   GRANS  75  67  61   LYMPH  10*  32  35   EOS  0  0  0      Coagulation Recent Labs      12/15/17   0455  12/14/17   1706   PTP  20.9*  19.5*   INR  1.9*  1.7*   APTT  37.3*  36.3       Iron/Ferritin No results for input(s): IRON in the last 72 hours. No lab exists for component: TIBCCALC   BNP No results for input(s): BNPP in the last 72 hours. Cardiac Enzymes Recent Labs      12/15/17   0435  12/14/17   2317   CPK  2203*  1729*   CKND1  3.3  3.5      Liver Enzymes Recent Labs      12/15/17   0435   TP  4.6*   ALB  1.9*   AP  533*   SGOT  359*      Thyroid Studies Lab Results   Component Value Date/Time    TSH 2.17 12/15/2017 04:35 AM         UB scan negative for urine  IMAGES: CT pend    CULTURE: Blood c/s and urine pend      Physical Assessment:     Visit Vitals    /78    Pulse 94    Temp 96.8 °F (36 °C)    Resp 27    Ht 5' 7\" (1.702 m)    Wt 49.9 kg (110 lb)    SpO2 98%    BMI 17.23 kg/m2     Last 3 Recorded Weights in this Encounter    12/14/17 1819   Weight: 49.9 kg (110 lb)       Intake/Output Summary (Last 24 hours) at 12/15/17 1217  Last data filed at 12/15/17 1100   Gross per 24 hour   Intake          1038.22 ml   Output              515 ml   Net           523.22 ml       Physial Exam:  General appearance: alert, cooperative, mild distress, appears stated age, emaciated  Skin: no rashes  HEENT: non icteric, pinkish conj  Neck: No JVD  Lungs: clear to auscultation bilaterally  Heart: regular rate and rhythm, no rub  Abdomen: soft, non-tender. Bowel sounds normal. No masses,  no organomegaly  Extremities: extremities normal, atraumatic, no cyanosis or edema    IMPRESSION AND PLAN:   ROXANNE most likely from severe hypotension/sepsis/ ACEI/ HCTZ. Discussed with Dr Liliane Pollack and TANG Núñez Scriver [phone]).  Will give him a trial of IV SPA and lasix to see if his urine output will improve, waiting on CT abd to R/O remote chance of obstructive uropathy. If no response will need to have family decide if they want to take on the risk of dialysis. He remains acidotic ( from renal failure and lactic acidosis)and severely azotemic, increasing risk of neurologic complications ie SZ and coma bleeding,hemodynamic compromise). I did discuss this possibility with the  patient and he seems to understand and say he would agree to dialysis but I am not sure if he is fully understanding the risks and benefits of dialysis. Avoid nephrotoxic drugs and adjust meds to renal  function  Underlying CKD stage 3 from HTN   Hypotension, cont with vasopressor and IVF  Anemia better post BT  Septic shock defer to ICU team       Thank you will follow with you.     Tatyana Reynolds MD  December 15, 2017

## 2017-12-15 NOTE — CONSULTS
[facility] - Consult Note    Consultation request by Geo Simon MD for advice/opinion related to evaluating     Abnormal echocardiogram     Date of  Admission: 12/14/2017  4:49 PM   Primary Care Physician:  Geo Simon MD     Assessment:     -Shock septic versus hypovolemic.  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  -Hypothermia, on elenita hugger.  -Hypotension, on pressors.  -Indeterminate troponin, not consistent with ACS, likely secondary demand ischemia, initial ECGs with likely rose wave, follow up ECG once warmed with nonspecific changes.  -Severe anemia with Hgb 5.4 requiring transfusion, stool occult positive on admission.  -Acute on chronic kidney injury with creatinine 4.45 on admission.  -Elevated LFTs. H/o cirrhosis and liver mass suspicious for hepatoma, negative pathology on recent biopsy.  -Lactic acidosis. -Chronic thrombocytopenia. -H/o poorly controlled HTN. -Diabetes Mellitus.  -No ischemia on nuclear 4/25/2014  -Normal LV function EF 55% on echo 11/2017 with history of [address] with EF 45% 4/2017.  -Possible small pericardial effusion on echo 11/25/2017.  -Tobacco abuse. -H/o noncompliance. No routine cardiology follow up. Plan: Will get follow up TTE today, will likely need to consider KRISTINE possible next week. Would continue empiric abx, blood cultures pending, would consider ID evaluation. Would continue supportive care with pressor support as needed. Would continue fluid resuscitation. History of Present Illness: This is a 80 y.o. male admitted for     Patient complains of:  AMS. Patient presented from [facility] with AMS. Patient was hypothermic and hypotensive. Patient had anemia with heme positive stool and renal failure. Patient had initial ECG with possible lateral St elevations. Patient was warmed. ECG improved. Patient was placed on pressor support with fluid resuscitation. Patient was transfused. Patient is currently awake and oriented to place. Patient denies any CP, palp. Patient has chronic SOB unchanged. Cardiac risk factors:  HTN    Review of Symptoms: Poor historian but 10 point ROS appears otherwise negative other than mentioned in HPI. Past Medical History:     Past Medical History:   Diagnosis Date    Diabetes ([address]     Hypertension          Social History:     Social History     Social History    Marital status: SINGLE     Spouse name: N/A    Number of children: N/A    Years of education: N/A     Social History Main Topics    Smoking status: Current Every Day Smoker     Packs/day: 0.25    Smokeless tobacco: Never Used    Alcohol use No    Drug use: No    Sexual activity: No     Other Topics Concern    Not on file     Social History Narrative        Family History:   No family history on file.      Medications:   No Known Allergies     Current Facility-Administered Medications   Medication Dose Route Frequency    vancomycin (VANCOCIN) 500 mg in 0.9% sodium chloride (MBP/ADV) 100 mL MBP  500 mg IntraVENous Q48H    metroNIDAZOLE (FLAGYL) IVPB premix 500 mg  500 mg IntraVENous Q6H    sodium chloride (NS) flush 5-10 mL  5-10 mL IntraVENous PRN    cefepime (MAXIPIME) 1 g in sterile water (preservative free) 10 mL IV syringe  1 g IntraVENous Q24H    dextrose 5 % - 0.45% NaCl infusion  125 mL/hr IntraVENous CONTINUOUS    0.9% sodium chloride infusion 250 mL  250 mL IntraVENous PRN    NOREPINephrine (LEVOPHED) 8 mg in 5% dextrose 250mL infusion  2-16 mcg/min IntraVENous TITRATE    VANCOMYCIN INFORMATION NOTE   Other Rx Dosing/Monitoring    insulin lispro (HUMALOG) injection   SubCUTAneous Q6H    glucose chewable tablet 16 g  4 Tab Oral PRN    glucagon (GLUCAGEN) injection 1 mg  1 mg IntraMUSCular PRN    dextrose (D50W) injection syrg 12.5-25 g  25-50 mL IntraVENous PRN    famotidine (PF) (PEPCID) 20 mg in sodium chloride 0.9 % 10 mL injection  20 mg IntraVENous DAILY         Physical Exam:     Visit Vitals    /78    Pulse 94    Temp 96.8 °F (36 °C)    Resp 27    Ht 5' 7\" (1.702 m)    Wt 49.9 kg (110 lb)    SpO2 98%    BMI 17.23 kg/m2     BP Readings from Last 3 Encounters:   12/15/17 109/78   12/01/17 (!) 146/96   11/24/17 148/82     Pulse Readings from Last 3 Encounters:   12/15/17 94   12/01/17 99   11/24/17 69     Wt Readings from Last 3 Encounters:   12/14/17 49.9 kg (110 lb)   12/01/17 48.8 kg (107 lb 8 oz)   11/23/17 47.6 kg (105 lb)       General:  alert, cooperative, no distress, appears stated age  Neck:  no JVD  Lungs:  clear to auscultation bilaterally  Heart:  regular rate and rhythm  Abdomen:  abdomen is soft without significant tenderness, masses, organomegaly or guarding  Extremities:  extremities normal, atraumatic, no cyanosis or edema  Skin: Warm and dry.  no hyperpigmentation, vitiligo, or suspicious lesions  Neuro: alert, oriented x3, affect appropriate  Psych: non focal     Data Review:     Recent Labs      12/15/17   0435  12/14/17 2149 12/14/17   1706   WBC  3.4*  3.4*  4.2*   HGB  10.7*  5.4*  6.3*   HCT  30.9*  15.8*  18.2*   PLT  82*  92*  125*     Recent Labs      12/15/17   0455  12/15/17   0435  12/14/17 2149 12/14/17   1706   NA   --   142  143  143   K   --   4.8  4.8  5.0   CL   --   109*  110*  106   CO2   --   14*  14*  16*   GLU   --   123*  154*  39*   BUN   --   194*  188*  210*   CREA   --   3.90*  4.05*  4.45*   CA   --   6.6*  6.2*  7.1*   MG   --   2.8*   --    --    PHOS   --   9.3*   --    --    ALB   --   1.9*  1.7*  2.1*   SGOT   --   359*  313*  298*   ALT   --   180*  147*  151*   INR  1.9*   --    --   1.7*       Results for orders placed or performed during the hospital encounter of 12/14/17   EKG, 12 LEAD, INITIAL   Result Value Ref Range    Ventricular Rate 91 BPM    Atrial Rate 91 BPM    P-R Interval 172 ms    QRS Duration 92 ms    Q-T Interval 372 ms    QTC Calculation (Bezet) 457 ms Calculated P Axis 74 degrees    Calculated R Axis 2 degrees    Calculated T Axis 105 degrees    Diagnosis       Normal sinus rhythm  Low voltage QRS  Nonspecific T wave abnormality  Abnormal ECG  When compared with ECG of 14-DEC-2017 17:07,  WV interval has decreased  Vent.  rate has increased BY  35 BPM  ST no longer elevated in Anterolateral leads         All Cardiac Markers in the last 24 hours:    Lab Results   Component Value Date/Time    CPK 2203 (H) 12/15/2017 04:35 AM    CPK 1729 (H) 12/14/2017 11:17 PM    CPK 1095 (H) 12/14/2017 05:06 PM    CKMB 72.8 (H) 12/15/2017 04:35 AM    CKMB 61.3 (H) 12/14/2017 11:17 PM    CKMB 40.7 (H) 12/14/2017 05:06 PM    CKND1 3.3 12/15/2017 04:35 AM    CKND1 3.5 12/14/2017 11:17 PM    CKND1 3.7 12/14/2017 05:06 PM    TROIQ 0.60 (H) 12/15/2017 04:35 AM    TROIQ 0.59 (H) 12/14/2017 11:17 PM    TROIQ 0.50 (H) 12/14/2017 05:06 PM    TNIPOC 0.54 ([place]) 12/14/2017 05:10 PM       Last Lipid:    Lab Results   Component Value Date/Time    Cholesterol, total 214 04/24/2017 10:15 AM    HDL Cholesterol 43 04/24/2017 10:15 AM    LDL, calculated 133.4 04/24/2017 10:15 AM    Triglyceride 188 04/24/2017 10:15 AM    CHOL/HDL Ratio 5.0 04/24/2017 10:15 AM       Signed By: NURY Willis     December 15, 2017

## 2017-12-15 NOTE — PROGRESS NOTES
Called by ER MD for ICU admission. Discussed case- reviewed chart and labs, imaging data. Meets criteria for admission to ICU. Hypotension  STEMI  Severe anemia- Acute blood loss  Metabolic derangements acute on chronic kidney injury  AMS    Recommend:  Fluid resuscitation  PRBC transfusion  Monitor H/H  Broad spectrum antibiotics  Monitor renal function and electrolytes  Hold ACE, diuretics  Needs early palliative care consult- goals of care  Nutrition  Will follow central line bundles    Full evaluation note to follow.

## 2017-12-15 NOTE — CONSULTS
[facility]  Pulmonary, Critical Care, and Sleep Medicine      Name: Dorothy Valdes MRN: [id number]   : 1935 Hospital: [facility]   Date: 12/15/2017          Critical Care Initial Patient Consult    Requesting MD: Dr. Sarah Gauthier                                                 Reason for CC Consult: hypotension     IMPRESSION:   · Shock, possibly hypovolemic +/- vasodilatory/ septic, etiology under investigation in setting of cachexia and new liver mass although pathology was negative and only confirmed cirrhosis; viral Hep serologies + HBV  · Acute symptomatic anemia, hemoccult positive however no active gross bleeding/ melena   · Acute encephalopathy, possibly uremic/ metabolic   · Acute on chronic kidney disease  · Systemic inflammatory response with hypothermia, leukopenia, elevated lactate -?bowel ischemia? · Thrombocytopenia   · Elevated LFTs in setting of liver cirrhosis  · Elevated total bilirubin (prior MRI abd with dilated peripheral ducts)  · Elevated cardiac enzymes with ST elevation in anterolateral leads -demand ischemia vs STEMI -recent echo (17) with definite mobile mass on base of anterior mitral valve leaflet -?vegetation  · Hypocalcemia   · Hx DM, latest hgb A1c 5.9 (2017)  · Hx HTN, diastolic HF      RECOMMENDATIONS:   · Resp -may continue with oxygen supplementation, titrate to d/c with target SpO2 > 90%. Encourage incentive spirometry  · ID - Severe sepsis bundle initiated in ED -continue IVF hydration; follow blood and urine c/s; continue empiric abx with vanc, cefepime, levaquin. De-escalate once cultures finalize. Hep viral serologies + HBs Ag, + HBs Ab, + HBe Ab but HBe Ag negative, pointing towards chronic Hep B. May need ID consult  · CVS - PRBC transfusion; actively wean vasopressor, target MAP > 65. Trend cardiac enzymes. May need to consider Cardiology consult and possible KRISTINE to investigate mobile mass.    · Heme/Onc- 2 units PRBC transfusion. Serial h/h; obtain Peripheral blood smear; monitor for active bleeding. Consider repeat Ct abd/pelv wo contrast if h/h continue to trend downwards in the absence of gross bleeding with increasing lactate level  · Metabolic - trend electrolytes and replace cautiously as needed given CKD. Replace Ca 1g IV now and repeat ionized calcium in am  · Renal - cornell cath, strict I/O. Monitor urine output and renal indices. Consider nephrology consult if with worsening renal failure  · Endocrine - frequent glycemic checks; obtain TSH  · Neuro/ Pain/ Sedation -avoid sedative/ narcotic medications given AMS  · GI - diet per primary. Trend LFTs. Consider GI consult for w/u of underlying liver mass  · Prophylaxis - DVT-SCDs only, GI: famotidine  · Recommend early palliative care consult thru primary team for goals of care  · Central line, cornell bundles     Subjective/History: This patient has been seen and evaluated at the request of Dr. Deborah Murphy for hypotension. Patient is a 80 y.o. male with medical hx significant for HTN, DM, CKD, diastolic HF, who was brought to the ED from [facility] facility for altered mental status with worsening lethargy and lab work ordered by primary which showed elevated BUN (> 160). In ED, pt was noted to be hypothermic and hypotensive. Initial lab w/u significant for elevated lactate; anemia; +hemoccult however no gross bleeding; elevated BUN and crea; elevated LFTs; elevated cardiac enzymes with EKG changes (ST elevation in anterolateral leads); CXR with possible small left pleural effusion however had been present since recent prior admission. Severe sepsis bundle initiated - pt received 30 ml/kg IVF resuscitation; blood and urine c/s obtained; empirically started on vanc, cefepime, levaquin. Vasopressor support with levophed started in ED as BP was refractory to IVF hydration. Right subclavian central line was inserted by ED physician.      Recent prior visit to [facility] ED and [facility] records reviewed as well. Pt recently presented to [facility] ED initially on 11/18 for sob and generalized abdominal pain of several weeks with increasing fall episodes. Pt was transferred on same day to [facility] for suspicion of liver laceration following Ct abd/ pelvis. During this admission, w/u included MRI abdomen wo contrast which showed central hepatic mass with dilated peripheral ducts. Lab studies significant for elevated total bili and LFTs. Plan for possible liver cancer work-up made and pt advised to transfer to SNF which he refused, and hence was discharged to home on 11/23. Pt went back to [facility] ED on 11/23 afternoon for dark colored stools, abdominal pain and bilateral lower extremity swelling. No further w/u was done. Pt again recommended to be placed at a SNF which he refused however family agreed to have pt confined to SNF because they are unable to care for him. APS was consulted; Crisis management also consulted but was deemed not to fit criteria for admission. After 2 days stay in ED, pt was discharged to home on 11/24. Pt came back to [facility] ED on 11/25 for complaint of generalized weakness, persistent abdominal pain and bilateral lower extremity swelling. Pt was admitted for suspicion of fluid overload in setting of ascites and liver cirrhosis. GI was consulted for liver mass and recommended to obtain tumor markers which they documented to be negative; also ordered viral serologies which were + HBV. On 11/29, pt underwent U/S-guided abdominal paracentesis by IR which yielded 1.2 L output with negative fluid culture. On 11/30, pt underwent Ct-guided liver biopsy which showed benign liver parenchyma with cirrhosis and no malignancy. Other work-up included an echo which showed normal EF 55% however with Gr 1 diastolic dysfunction; RVSP 40-45 mmHg and a definite mobile mass on base of anterior leaflet. Pt was subsequently discharged on 12/1 to SNF.     The patient is critically ill and can not provide additional history due to altered mental status. Past Medical History:   Diagnosis Date    Diabetes ([facility] [address]     Hypertension       Past Surgical History:   Procedure Laterality Date    NEUROLOGICAL PROCEDURE UNLISTED        Prior to Admission medications    Medication Sig Start Date End Date Taking? Authorizing Provider   aspirin delayed-release 81 mg tablet Take 1 Tab by mouth daily. 4/27/17   Cristal Carrero MD   felodipine (PLENDIL SR) 10 mg 24 hr tablet Take 1 Tab by mouth daily. 4/27/17   Cristal Carrero MD   hydroCHLOROthiazide (HYDRODIURIL) 25 mg tablet Take 1 Tab by mouth daily. 4/27/17   Cristal Carrero MD   lisinopril (PRINIVIL, ZESTRIL) 10 mg tablet Take 1 Tab by mouth daily. 4/27/17   Cristal Carrero MD     Current Facility-Administered Medications   Medication Dose Route Frequency    [START ON 12/16/2017] levoFLOXacin (LEVAQUIN) 500 mg in D5W IVPB  500 mg IntraVENous Q48H    cefepime (MAXIPIME) 1 g in sterile water (preservative free) 10 mL IV syringe  1 g IntraVENous Q24H    sodium chloride 0.9 % bolus infusion 1,497 mL  30 mL/kg IntraVENous ONCE    dextrose 5 % - 0.45% NaCl infusion  125 mL/hr IntraVENous CONTINUOUS    NOREPINephrine (LEVOPHED) 8 mg in 5% dextrose 250mL infusion  2-16 mcg/min IntraVENous TITRATE    VANCOMYCIN INFORMATION NOTE   Other Rx Dosing/Monitoring    insulin lispro (HUMALOG) injection   SubCUTAneous Q6H    famotidine (PF) (PEPCID) 20 mg in sodium chloride 0.9 % 10 mL injection  20 mg IntraVENous DAILY     No Known Allergies   Social History   Substance Use Topics    Smoking status: Current Every Day Smoker     Packs/day: 0.25    Smokeless tobacco: Never Used    Alcohol use No      No family history on file. Review of Systems:  Review of systems not obtained due to patient factors.     Objective:   Vital Signs:    Visit Vitals    BP 96/68    Pulse 79    Temp (!) 92.1 °F (33.4 °C)    Resp 18    Wt 49.9 kg (110 lb)    SpO2 100%    BMI 18.3 kg/m2       O2 Device: Room air       Temp (24hrs), Av.4 °F (31.9 °C), Min:86 °F (30 °C), Max:92.1 °F (33.4 °C)       Intake/Output:   Last shift:      1901 - 12/15 0700  In: 340   Out: -   Last 3 shifts:      Intake/Output Summary (Last 24 hours) at 12/15/17 0348  Last data filed at 12/15/17 0055   Gross per 24 hour   Intake              340 ml   Output                0 ml   Net              340 ml       Physical Exam:    General:  Frail, cachectic-appearing male; lethargic on transfer to ICU however able to protect airway    Head:  Normocephalic, without obvious abnormality, atraumatic. Eyes:  Pale palpebral conjunctivae, anicteric sclerae   Nose: Nares normal. No drainage    Throat: Lips, mucosa, and tongue dry   Neck: Supple, symmetrical, trachea midline, no adenopathy, thyroid: no enlargment/tenderness/nodules, no carotid bruit and no JVD. Lungs:   Clear to auscultation bilaterally. Chest wall:  No deformity. Heart:  Regular rate and rhythm, S1, S2 present   Abdomen:   Soft, + generalized tenderness on all quadrants, most pronounced on LUQ/LLQ; + bowel sounds    Extremities: Extremities atraumatic, no cyanosis; + b/l LE mild pitting edema. Pulses: Weak but symmetric all extremities.    Skin: Dry skin; no bruising/ petechiae   Lymph nodes: Cervical, supraclavicular nodes normal.   Neurologic: Lethargic on transfer to ICU however this morning started to be more interactive and answer some simple questions       Data:     Recent Results (from the past 24 hour(s))   EKG, 12 LEAD, INITIAL    Collection Time: 17  4:58 PM   Result Value Ref Range    Ventricular Rate 55 BPM    Atrial Rate 55 BPM    P-R Interval 218 ms    QRS Duration 96 ms    Q-T Interval 516 ms    QTC Calculation (Bezet) 493 ms    Calculated P Axis 76 degrees    Calculated R Axis -4 degrees    Calculated T Axis 85 degrees    Diagnosis       Sinus bradycardia with 1st degree AV block  Moderate voltage criteria for LVH, may be normal variant  ST elevation, consider early repolarization, pericarditis, or injury  Nonspecific T wave abnormality  Prolonged QT  Abnormal ECG  When compared with ECG of 25-NOV-2017 17:42,  premature ventricular complexes are no longer present  ME interval has increased  ST elevation now present in Anterolateral leads     GLUCOSE, POC    Collection Time: 12/14/17  4:59 PM   Result Value Ref Range    Glucose (POC) 119 (H) 70 - 110 mg/dL   CBC WITH AUTOMATED DIFF    Collection Time: 12/14/17  5:06 PM   Result Value Ref Range    WBC 4.2 (L) 4.6 - 13.2 K/uL    RBC 1.84 (L) 4.70 - 5.50 M/uL    HGB 6.3 (L) 13.0 - 16.0 g/dL    HCT 18.2 (L) 36.0 - 48.0 %    MCV 98.9 (H) 74.0 - 97.0 FL    MCH 34.2 (H) 24.0 - 34.0 PG    MCHC 34.6 31.0 - 37.0 g/dL    RDW 19.0 (H) 11.6 - 14.5 %    PLATELET 406 (L) 187 - 420 K/uL    MPV 9.9 9.2 - 11.8 FL    NEUTROPHILS 61 40 - 73 %    LYMPHOCYTES 35 21 - 52 %    MONOCYTES 4 3 - 10 %    EOSINOPHILS 0 0 - 5 %    BASOPHILS 0 0 - 2 %    ABS. NEUTROPHILS 2.6 1.8 - 8.0 K/UL    ABS. LYMPHOCYTES 1.5 0.9 - 3.6 K/UL    ABS. MONOCYTES 0.2 0.05 - 1.2 K/UL    ABS. EOSINOPHILS 0.0 0.0 - 0.4 K/UL    ABS. BASOPHILS 0.0 0.0 - 0.1 K/UL    DF AUTOMATED     METABOLIC PANEL, COMPREHENSIVE    Collection Time: 12/14/17  5:06 PM   Result Value Ref Range    Sodium 143 136 - 145 mmol/L    Potassium 5.0 3.5 - 5.5 mmol/L    Chloride 106 100 - 108 mmol/L    CO2 16 (L) 21 - 32 mmol/L    Anion gap 21 (H) 3.0 - 18 mmol/L    Glucose 39 (LL) 74 - 99 mg/dL     (H) 7.0 - 18 MG/DL    Creatinine 4.45 (H) 0.6 - 1.3 MG/DL    BUN/Creatinine ratio 47 (H) 12 - 20      GFR est AA 16 (L) >60 ml/min/1.73m2    GFR est non-AA 13 (L) >60 ml/min/1.73m2    Calcium 7.1 (L) 8.5 - 10.1 MG/DL    Bilirubin, total 2.3 (H) 0.2 - 1.0 MG/DL    ALT (SGPT) 151 (H) 16 - 61 U/L    AST (SGOT) 298 (H) 15 - 37 U/L    Alk.  phosphatase 467 (H) 45 - 117 U/L    Protein, total 4.7 (L) 6.4 - 8.2 g/dL    Albumin 2.1 (L) 3.4 - 5.0 g/dL    Globulin 2.6 2.0 - 4.0 g/dL    A-G Ratio 0.8 0.8 - 1.7     CARDIAC PANEL,(CK, CKMB & TROPONIN)    Collection Time: 12/14/17  5:06 PM   Result Value Ref Range    CK 1095 (H) 39 - 308 U/L    CK - MB 40.7 (H) <3.6 ng/ml    CK-MB Index 3.7 0.0 - 4.0 %    Troponin-I, Qt. 0.50 (H) 0.0 - 0.045 NG/ML   PROTHROMBIN TIME + INR    Collection Time: 12/14/17  5:06 PM   Result Value Ref Range    Prothrombin time 19.5 (H) 11.5 - 15.2 sec    INR 1.7 (H) 0.8 - 1.2     PTT    Collection Time: 12/14/17  5:06 PM   Result Value Ref Range    aPTT 36.3 23.0 - 36.4 SEC   NT-PRO BNP    Collection Time: 12/14/17  5:06 PM   Result Value Ref Range    NT pro-BNP 4137 (H) 0 - 1800 PG/ML   EKG, 12 LEAD, SUBSEQUENT    Collection Time: 12/14/17  5:07 PM   Result Value Ref Range    Ventricular Rate 56 BPM    Atrial Rate 56 BPM    P-R Interval 210 ms    QRS Duration 96 ms    Q-T Interval 506 ms    QTC Calculation (Bezet) 488 ms    Calculated P Axis 74 degrees    Calculated R Axis 2 degrees    Calculated T Axis 86 degrees    Diagnosis       Sinus bradycardia with 1st degree AV block  Moderate voltage criteria for LVH, may be normal variant  ST elevation, consider early repolarization, pericarditis, or injury  Nonspecific T wave abnormality  Prolonged QT  Abnormal ECG  When compared with ECG of 14-DEC-2017 16:58,  No significant change was found     POC TROPONIN-I    Collection Time: 12/14/17  5:10 PM   Result Value Ref Range    Troponin-I (POC) 0.54 (HH) 0.00 - 0.08 ng/mL   POC LACTIC ACID    Collection Time: 12/14/17  5:17 PM   Result Value Ref Range    Lactic Acid (POC) 4.9 (HH) 0.4 - 2.0 mmol/L   GLUCOSE, POC    Collection Time: 12/14/17  6:15 PM   Result Value Ref Range    Glucose (POC) 162 (H) 70 - 110 mg/dL   URINALYSIS W/ RFLX MICROSCOPIC    Collection Time: 12/14/17  6:56 PM   Result Value Ref Range    Color YELLOW      Appearance CLOUDY      Specific gravity 1.023 1.005 - 1.030      pH (UA) 5.0 5.0 - 8.0      Protein 30 (A) NEG mg/dL    Glucose NEGATIVE  NEG mg/dL    Ketone NEGATIVE  NEG mg/dL    Bilirubin NEGATIVE  NEG      Blood SMALL (A) NEG      Urobilinogen 0.2 0.2 - 1.0 EU/dL    Nitrites NEGATIVE  NEG      Leukocyte Esterase TRACE (A) NEG     DRUG SCREEN, URINE    Collection Time: 12/14/17  6:56 PM   Result Value Ref Range    BENZODIAZEPINES NEGATIVE  NEG      BARBITURATES NEGATIVE  NEG      THC (TH-CANNABINOL) NEGATIVE  NEG      OPIATES NEGATIVE  NEG      PCP(PHENCYCLIDINE) NEGATIVE  NEG      COCAINE NEGATIVE  NEG      AMPHETAMINES NEGATIVE  NEG      METHADONE NEGATIVE  NEG      HDSCOM (NOTE)    URINE MICROSCOPIC ONLY    Collection Time: 12/14/17  6:56 PM   Result Value Ref Range    WBC 0 to 3 0 - 4 /hpf    RBC 0 to 2 0 - 5 /hpf    Epithelial cells FEW 0 - 5 /lpf    Bacteria FEW (A) NEG /hpf    Amorphous Crystals FEW (A) NEG      Hyaline cast 0 to 1 0 - 2 /lpf    Other: SPERMATOZOA NOTED +1    TYPE + CROSSMATCH    Collection Time: 12/14/17  8:00 PM   Result Value Ref Range    Crossmatch Expiration 12/17/2017     ABO/Rh(D) A POSITIVE     Antibody screen NEG     Unit number [id number]     Blood component type  LR AS1     Unit division 00     Status of unit ISSUED     Crossmatch result Compatible     Unit number [id number]     Blood component type RC LR AS1     Unit division 00     Status of unit ISSUED     Crossmatch result Compatible    CBC WITH AUTOMATED DIFF    Collection Time: 12/14/17  9:49 PM   Result Value Ref Range    WBC 3.4 (L) 4.6 - 13.2 K/uL    RBC 1.57 (L) 4.70 - 5.50 M/uL    HGB 5.4 (LL) 13.0 - 16.0 g/dL    HCT 15.8 (LL) 36.0 - 48.0 %    .6 (H) 74.0 - 97.0 FL    MCH 34.4 (H) 24.0 - 34.0 PG    MCHC 34.2 31.0 - 37.0 g/dL    RDW 19.1 (H) 11.6 - 14.5 %    PLATELET 92 (L) 524 - 420 K/uL    MPV 10.9 9.2 - 11.8 FL    NEUTROPHILS 67 40 - 73 %    LYMPHOCYTES 32 21 - 52 %    MONOCYTES 1 (L) 3 - 10 %    EOSINOPHILS 0 0 - 5 %    BASOPHILS 0 0 - 2 %    ABS. NEUTROPHILS 2.3 1.8 - 8.0 K/UL    ABS. LYMPHOCYTES 1.1 0.9 - 3.6 K/UL    ABS.  MONOCYTES 0.0 (L) 0.05 - 1.2 K/UL    ABS. EOSINOPHILS 0.0 0.0 - 0.4 K/UL    ABS. BASOPHILS 0.0 0.0 - 0.1 K/UL    DF AUTOMATED     METABOLIC PANEL, COMPREHENSIVE    Collection Time: 12/14/17  9:49 PM   Result Value Ref Range    Sodium 143 136 - 145 mmol/L    Potassium 4.8 3.5 - 5.5 mmol/L    Chloride 110 (H) 100 - 108 mmol/L    CO2 14 (L) 21 - 32 mmol/L    Anion gap 19 (H) 3.0 - 18 mmol/L    Glucose 154 (H) 74 - 99 mg/dL     (H) 7.0 - 18 MG/DL    Creatinine 4.05 (H) 0.6 - 1.3 MG/DL    BUN/Creatinine ratio 46 (H) 12 - 20      GFR est AA 17 (L) >60 ml/min/1.73m2    GFR est non-AA 14 (L) >60 ml/min/1.73m2    Calcium 6.2 (L) 8.5 - 10.1 MG/DL    Bilirubin, total 2.1 (H) 0.2 - 1.0 MG/DL    ALT (SGPT) 147 (H) 16 - 61 U/L    AST (SGOT) 313 (H) 15 - 37 U/L    Alk. phosphatase 421 (H) 45 - 117 U/L    Protein, total 3.9 (L) 6.4 - 8.2 g/dL    Albumin 1.7 (L) 3.4 - 5.0 g/dL    Globulin 2.2 2.0 - 4.0 g/dL    A-G Ratio 0.8 0.8 - 1.7     CARDIAC PANEL,(CK, CKMB & TROPONIN)    Collection Time: 12/14/17 11:17 PM   Result Value Ref Range    CK 1729 (H) 39 - 308 U/L    CK - MB 61.3 (H) <3.6 ng/ml    CK-MB Index 3.5 0.0 - 4.0 %    Troponin-I, Qt. 0.59 (H) 0.0 - 0.045 NG/ML   LACTIC ACID    Collection Time: 12/14/17 11:17 PM   Result Value Ref Range    Lactic acid 4.8 (HH) 0.4 - 2.0 MMOL/L   GLUCOSE, POC    Collection Time: 12/15/17  1:04 AM   Result Value Ref Range    Glucose (POC) 183 (H) 70 - 110 mg/dL             Telemetry:normal sinus rhythm    Imaging:  CXR Results  (Last 48 hours)               12/14/17 1745  XR CHEST PORT Final result    Impression:  IMPRESSION:       1. Interstitial infiltrates/edema and borderline cardiac silhouette size. Possible small left pleural effusion. Narrative:  EXAMINATION: Chest portable       INDICATION: STEMI       COMPARISON: 11/25/2017       FINDINGS: Frontal view of the chest obtained. Prominent perihilar and basilar   interstitial markings. No confluent consolidation.  Elevated left hemidiaphragm. Borderline cardiac silhouette. Possible small left pleural effusion. Aortic arch   calcifications. Right subclavian line, tip at mid SVC level. No pneumothorax   identified. No acute osseous findings.                  CT Results  (Last 48 hours)    None                January-Sarah SORIANO PA-C

## 2017-12-15 NOTE — ED NOTES
TRANSFER - OUT REPORT:    Verbal report given to [facility], RN(name) on [employer]  being transferred to ICU(unit) for routine progression of care       Report consisted of patients Situation, Background, Assessment and   Recommendations(SBAR). Information from the following report(s) SBAR, ED Summary, [facility] and Recent Results was reviewed with the receiving nurse. Lines:   Venous Access Device Central line  12/14/17 Upper chest (subclavicular area, right (Active)       Peripheral IV 12/14/17 Right Antecubital (Active)   Site Assessment Clean, dry, & intact 12/14/2017  5:21 PM   Phlebitis Assessment 0 12/14/2017  5:21 PM   Infiltration Assessment 0 12/14/2017  5:21 PM   Dressing Status Clean, dry, & intact 12/14/2017  5:21 PM   Dressing Type Transparent 12/14/2017  5:21 PM       Peripheral IV 12/14/17 Left Antecubital (Active)   Site Assessment Clean, dry, & intact 12/14/2017  5:21 PM   Phlebitis Assessment 0 12/14/2017  5:21 PM   Infiltration Assessment 0 12/14/2017  5:21 PM   Dressing Status Clean, dry, & intact 12/14/2017  5:21 PM   Dressing Type Transparent 12/14/2017  5:21 PM        Opportunity for questions and clarification was provided.       Patient transported with:   Monitor  Registered Nurse

## 2017-12-15 NOTE — PROGRESS NOTES
Progress Note    Patient: Luis Osborn MRN: [id number]  SSN: [id number]    YOB: 1935  Age: 80 y.o. Sex: male      Admit Date: 12/14/2017    LOS: 1 day     Subjective:     Patient with FTT and cirrhosis admitted with anemia and concern for sepsis with hypotension and hypothermia. Cultures so far negative. Patient more alert this am.    Objective:     Vitals:    12/15/17 0900 12/15/17 0915 12/15/17 0930 12/15/17 0945   BP: 99/72 105/80 98/72 102/78   Pulse: 86 84 79 80   Resp: 20 24 15 14   Temp:       SpO2: 100% 100%     Weight:       Height:            Intake and Output:  Current Shift: 12/15 0701 - 12/15 1900  In: 341.7 [I.V.:341.7]  Out: 75 [Urine:75]  Last three shifts: 12/13 1901 - 12/15 0700  In: 673.3   Out: 390 [Urine:390]    Physical Exam:   GENERAL: fatigued, cooperative, mild distress, appears older than stated age  LUNG: clear to auscultation bilaterally  HEART: regular rate and rhythm  ABDOMEN: abnormal findings:  tenderness mild in the RUQ    Lab/Data Review: All lab results for the last 24 hours reviewed.      Cultures negative so far  hct 30  Creatinine 3.9   CO2 19   LFTs elevated INR 1.9  Wbc 3.4    Assessment:     Active Problems:    Sepsis ([facility] (12/14/2017)      Hypotension (12/14/2017)      Anemia (12/14/2017)      STEMI (ST elevation myocardial infarction) ([facility] (12/14/2017)      Septic shock ([facility] (12/14/2017)        Plan:     Continue supportive care and antibiotics vitamin k    Signed By: Chencho Coulter MD     December 15, 2017

## 2017-12-16 NOTE — PROGRESS NOTES
Progress Note    Patient: Danyelle Limon MRN: [id number]  CSN: [id number]    YOB: 1935  Age: 80 y.o. Sex: male    DOA: 12/14/2017 LOS:  LOS: 2 days                    Subjective:    temporary dialysis catheter is pending Dr Aiden Root try to insert it  this morning going for dialysis . Will get vascular surgery consult    Hospital course   80 y. o. male with h/o type 2 Dm (last hgbA1c 5.9 on 11/26/17), cirrhosis, chronic renal failure admitted to [facility] on 12/14/17 with hypotension, hypothermia. Looking back at his history, Patient had ascites and liver mass but pathology negative. Patient had been transferred to a SNF for therapy. Patient had altered mental status on 12/13 and noted to be more lethargic on 12/14/17. Blood work was done and patient with  so sent to the ER. Patient was admitted for  anemia and evidence of possible sepsis with hypotension and hypothermia. Admitted to ICU started on pressors. Echo from 11/25/17 revealed mobile mitral valve vegetation. No further intervention was carried out at that time.       Of note, patient had paracentesis 11/29 which revealed 75 nucleated cells, no organisms.      Patient states that he has not been feeling good for the last few days. C/o abdominal pain. No headaches, cough, increased sob. Patient denies headaches, visual disturbances, sore throat, runny nose, earaches,  chills, abdominal pain, diarrhea, burning micturition, pain or weakness in extremities. He denies back pain/flank pain.      Chief Complaint:   Chief Complaint   Patient presents with    Lethargy       Review of systems  General: No fevers or chills. Cardiovascular: No chest pain or pressure. No palpitations. Pulmonary: positive shortness of breath, cough or wheeze. Gastrointestinal: No abdominal pain, nausea, vomiting or diarrhea. Genitourinary: No urinary frequency, urgency, hesitancy or dysuria.    Musculoskeletal: No joint or muscle pain, no back pain, no recent trauma. Neurologic: No headache, numbness,  Generalized weakness     Objective:     Physical Exam:  Visit Vitals    /89    Pulse 89    Temp 97.5 °F (36.4 °C)    Resp 28    Ht 5' 7\" (1.702 m)    Wt 49.9 kg (110 lb)    SpO2 (!) 73%    BMI 17.23 kg/m2        General:         Alert, cooperative, no acute distress    HEENT: NC, Atraumatic. PERRLA, anicteric sclerae. Lungs: CTA Bilaterally. No Wheezing/Rhonchi/Rales. Heart:  Regular  rhythm,  No murmur, No Rubs, No Gallops  Abdomen: Soft, Non distended, Non tender.  +Bowel sounds, no HSM  Extremities:  lower limb edema  Psych:   Not anxious or agitated. Neurologic:  CN 2-12 grossly intact, Alert and  confused.   No acute neurological                                 Deficits,     Intake and Output:  Current Shift:     Last three shifts:  12/14 1901 - 12/16 0700  In: 4778.2 [P.O.:60; I.V.:4044.9]  Out: 825 [Urine:825]    Labs: Results:       Chemistry Recent Labs      12/16/17   0400  12/15/17   2200  12/15/17   0435  12/14/17   2149   GLU  90  84  123*  154*   NA  141  140  142  143   K  4.2  4.4  4.8  4.8   CL  109*  108  109*  110*   CO2  16*  14*  14*  14*   BUN  194*  179*  194*  188*   CREA  3.87*  3.87*  3.90*  4.05*   CA  5.9*  6.0*  6.6*  6.2*   AGAP  16  18  19*  19*   BUCR  50*  46*  50*  46*   AP  431*   --   533*  421*   TP  4.4*   --   4.6*  3.9*   ALB  2.1*   --   1.9*  1.7*   GLOB  2.3   --   2.7  2.2   AGRAT  0.9   --   0.7*  0.8      CBC w/Diff Recent Labs      12/16/17   0400  12/15/17   2200  12/15/17   0435  12/14/17   2149   WBC  8.1   --   3.4*  3.4*   RBC  3.35*   --   3.40*  1.57*   HGB  10.5*  10.6*  10.7*  5.4*   HCT  29.4*  29.4*  30.9*  15.8*   PLT  56*   --   82*  92*   GRANS  85*   --   75  67   LYMPH  1*   --   10*  32   EOS  0   --   0  0      Cardiac Enzymes Recent Labs      12/15/17   1500  12/15/17   0435   CPK  2299*  2203*   CKND1  2.5  3.3      Coagulation Recent Labs      12/15/17   0455  12/14/17 1706   PTP  20.9*  19.5*   INR  1.9*  1.7*   APTT  37.3*  36.3       Lipid Panel Lab Results   Component Value Date/Time    Cholesterol, total 214 04/24/2017 10:15 AM    HDL Cholesterol 43 04/24/2017 10:15 AM    LDL, calculated 133.4 04/24/2017 10:15 AM    VLDL, calculated 37.6 04/24/2017 10:15 AM    Triglyceride 188 04/24/2017 10:15 AM    CHOL/HDL Ratio 5.0 04/24/2017 10:15 AM      BNP No results for input(s): BNPP in the last 72 hours.    Liver Enzymes Recent Labs      12/16/17   0400   TP  4.4*   ALB  2.1*   AP  431*   SGOT  197*      Thyroid Studies Lab Results   Component Value Date/Time    TSH 2.17 12/15/2017 04:35 AM          Procedures/imaging: see electronic medical records for all procedures/Xrays and details which were not copied into this note but were reviewed prior to creation of Plan    Medications:   Current Facility-Administered Medications   Medication Dose Route Frequency    0.9% sodium chloride infusion  100 mL/hr IntraVENous DIALYSIS PRN    alteplase (CATHFLO) 2 mg in sterile water (preservative free) 2 mL injection  2 mg InterCATHeter ONCE PRN    heparin (porcine) 1,000 unit/mL injection 1,000 Units  1,000 Units InterCATHeter DIALYSIS PRN    calcium acetate (PHOSLYRA) ORAL SOLUTION 1,334 mg  10 mL Oral TID WITH MEALS    lidocaine (PF) (XYLOCAINE) 10 mg/mL (1 %) injection        Vancomycin random level due stat  1 Each Other NOW    [START ON 12/17/2017] Vancomycin random level due 12/17/17 at 0400  1 Each Other ONCE    metroNIDAZOLE (FLAGYL) IVPB premix 500 mg  500 mg IntraVENous Q6H    cefTRIAXone (ROCEPHIN) 2 g in sterile water (preservative free) 20 mL IV syringe  2 g IntraVENous Q24H    sodium chloride (NS) flush 5-10 mL  5-10 mL IntraVENous PRN    dextrose 5 % - 0.45% NaCl infusion  80 mL/hr IntraVENous CONTINUOUS    0.9% sodium chloride infusion 250 mL  250 mL IntraVENous PRN    NOREPINephrine (LEVOPHED) 8 mg in 5% dextrose 250mL infusion  2-16 mcg/min IntraVENous TITRATE    VANCOMYCIN INFORMATION NOTE   Other Rx Dosing/Monitoring    insulin lispro (HUMALOG) injection   SubCUTAneous Q6H    glucose chewable tablet 16 g  4 Tab Oral PRN    glucagon (GLUCAGEN) injection 1 mg  1 mg IntraMUSCular PRN    dextrose (D50W) injection syrg 12.5-25 g  25-50 mL IntraVENous PRN    famotidine (PF) (PEPCID) 20 mg in sodium chloride 0.9 % 10 mL injection  20 mg IntraVENous DAILY       Assessment/Plan     Active Problems:    Sepsis (HCC) (12/14/2017)      Hypotension (12/14/2017)      Anemia (12/14/2017)      STEMI (ST elevation myocardial infarction) ([facility] (12/14/2017)      Septic shock ([facility] [address] (12/14/2017)    plan  Continue IV rocephin, vancomycin  Pressor prn  F/u ID  Repeat ct scan  Liver biopsy last admission negative for cancer  Start  Dialysis  Palliative care consult pending  Continue to monitor lab    Sherry Mi MD  12/16/2017 3:09 PM

## 2017-12-16 NOTE — ROUTINE PROCESS
Tempt to call TANG Jackson for concent for HD catheter. Message left to return call to nursing unit.

## 2017-12-16 NOTE — PROGRESS NOTES
Subjective:  Symptoms:  Improved. No shortness of breath or chest pain. Diet:  Poor intake. Activity level: Impaired due to weakness. Patient awake and alert - speaking more words today  He remained on pressors overnight but rate has decreased slightly  Family made him DNR/DNI with continued care otherwise for now  No new events noted    Temp:  [86 °F (30 °C)-97.7 °F (36.5 °C)]   Pulse (Heart Rate):  [55-99]   BP: ()/(42-89)   Resp Rate:  [7-32]   O2 Sat (%):  [61 %-100 %]   Weight:  [49.9 kg (110 lb)]      12/14 1901 - 12/16 0700  In: 4778.2 [P.O.:60; I.V.:4044.9]  Out: 825 [Urine:825]      Objective:  General Appearance: In no acute distress, ill-appearing and comfortable. Vital signs: (most recent): Blood pressure 122/89, pulse 89, temperature 97.5 °F (36.4 °C), resp. rate 28, height 5' 7\" (1.702 m), weight 49.9 kg (110 lb), SpO2 (!) 73 %. Output: Producing urine. Lungs:  Normal respiratory rate and normal effort. There are decreased breath sounds. Heart: Normal rate. Regular rhythm. Neurological: Patient is alert. Abdomen: Abdomen is soft. Bowel sounds are normal.   There is no abdominal tenderness.        Recent Results (from the past 24 hour(s))   GLUCOSE, POC    Collection Time: 12/15/17  5:52 PM   Result Value Ref Range    Glucose (POC) 92 70 - 110 mg/dL   HGB & HCT    Collection Time: 12/15/17 10:00 PM   Result Value Ref Range    HGB 10.6 (L) 13.0 - 16.0 g/dL    HCT 29.4 (L) 36.0 - 17.4 %   METABOLIC PANEL, BASIC    Collection Time: 12/15/17 10:00 PM   Result Value Ref Range    Sodium 140 136 - 145 mmol/L    Potassium 4.4 3.5 - 5.5 mmol/L    Chloride 108 100 - 108 mmol/L    CO2 14 (L) 21 - 32 mmol/L    Anion gap 18 3.0 - 18 mmol/L    Glucose 84 74 - 99 mg/dL     (H) 7.0 - 18 MG/DL    Creatinine 3.87 (H) 0.6 - 1.3 MG/DL    BUN/Creatinine ratio 46 (H) 12 - 20      GFR est AA 18 (L) >60 ml/min/1.73m2    GFR est non-AA 15 (L) >60 ml/min/1.73m2    Calcium 6.0 (L) 8.5 - 10.1 MG/DL   LACTIC ACID    Collection Time: 12/15/17 10:00 PM   Result Value Ref Range    Lactic acid 2.9 (HH) 0.4 - 2.0 MMOL/L   GLUCOSE, POC    Collection Time: 12/16/17  1:14 AM   Result Value Ref Range    Glucose (POC) 94 70 - 036 mg/dL   METABOLIC PANEL, COMPREHENSIVE    Collection Time: 12/16/17  4:00 AM   Result Value Ref Range    Sodium 141 136 - 145 mmol/L    Potassium 4.2 3.5 - 5.5 mmol/L    Chloride 109 (H) 100 - 108 mmol/L    CO2 16 (L) 21 - 32 mmol/L    Anion gap 16 3.0 - 18 mmol/L    Glucose 90 74 - 99 mg/dL     (H) 7.0 - 18 MG/DL    Creatinine 3.87 (H) 0.6 - 1.3 MG/DL    BUN/Creatinine ratio 50 (H) 12 - 20      GFR est AA 18 (L) >60 ml/min/1.73m2    GFR est non-AA 15 (L) >60 ml/min/1.73m2    Calcium 5.9 (LL) 8.5 - 10.1 MG/DL    Bilirubin, total 2.6 (H) 0.2 - 1.0 MG/DL    ALT (SGPT) 139 (H) 16 - 61 U/L    AST (SGOT) 197 (H) 15 - 37 U/L    Alk. phosphatase 431 (H) 45 - 117 U/L    Protein, total 4.4 (L) 6.4 - 8.2 g/dL    Albumin 2.1 (L) 3.4 - 5.0 g/dL    Globulin 2.3 2.0 - 4.0 g/dL    A-G Ratio 0.9 0.8 - 1.7     LACTIC ACID    Collection Time: 12/16/17  4:00 AM   Result Value Ref Range    Lactic acid 2.3 (HH) 0.4 - 2.0 MMOL/L   TROPONIN I    Collection Time: 12/16/17  4:00 AM   Result Value Ref Range    Troponin-I, Qt. 0.90 (H) 0.0 - 0.045 NG/ML   CBC WITH AUTOMATED DIFF    Collection Time: 12/16/17  4:00 AM   Result Value Ref Range    WBC 8.1 4.6 - 13.2 K/uL    RBC 3.35 (L) 4.70 - 5.50 M/uL    HGB 10.5 (L) 13.0 - 16.0 g/dL    HCT 29.4 (L) 36.0 - 48.0 %    MCV 87.8 74.0 - 97.0 FL    MCH 31.3 24.0 - 34.0 PG    MCHC 35.7 31.0 - 37.0 g/dL    RDW 20.0 (H) 11.6 - 14.5 %    PLATELET 56 (L) 477 - 420 K/uL    MPV 10.4 9.2 - 11.8 FL    NEUTROPHILS 85 (H) 42 - 75 %    BAND NEUTROPHILS 10 (H) 0 - 5 %    LYMPHOCYTES 1 (L) 20 - 51 %    MONOCYTES 3 2 - 9 %    EOSINOPHILS 0 0 - 5 %    BASOPHILS 0 0 - 3 %    METAMYELOCYTES 1 (H) 0 %    NRBC 6.0 (H) 0  WBC    ABS. NEUTROPHILS 7.7 1.8 - 8.0 K/UL    ABS. LYMPHOCYTES 0.1 (L) 0.8 - 3.5 K/UL    ABS. MONOCYTES 0.2 0 - 1.0 K/UL    ABS. EOSINOPHILS 0.0 0.0 - 0.4 K/UL    ABS. BASOPHILS 0.0 0.0 - 0.06 K/UL    DF MANUAL      PLATELET COMMENTS DECREASED PLATELETS      RBC COMMENTS ANISOCYTOSIS  1+        RBC COMMENTS LORENZO CELLS  1+        RBC COMMENTS OVALOCYTES  1+        RBC COMMENTS POIKILOCYTOSIS  1+       GLUCOSE, POC    Collection Time: 12/16/17  6:53 AM   Result Value Ref Range    Glucose (POC) 94 70 - 110 mg/dL   GLUCOSE, POC    Collection Time: 12/16/17 11:56 AM   Result Value Ref Range    Glucose (POC) 94 70 - 110 mg/dL         Active Problems:    Sepsis ([facility] [address] (12/14/2017)      Hypotension (12/14/2017)      Anemia (12/14/2017)      STEMI (ST elevation myocardial infarction) ([facility] [address] (12/14/2017)      Septic shock ([facility] [address] (12/14/2017)          Assessment:    Condition: In serious condition. Improving. 1. Pulm - stable respiratory status on 2lpm NC today   -monitor closely    2. CV - septic shock improved with IVF and vasopressor support   -wean pressors as able    3. ID - WBC's normal and urine cx only one positive thus far with Beta-non-hemolytic strep   -follow cx's and adjust abx per culture results    4. Renal - ARF with no change in BUN/creat overnight; family agreed to HD at this time   -place HD catheter today   -HD per nephrology    5. GI - cirrhosis and possible malignancy (though reported bx was negative)   -GI consult pending    6.  Dispo - prognosis remains quite guarded

## 2017-12-16 NOTE — ROUTINE PROCESS
Patient's POA return call. MD spoke with her in regards to patient to receive dialysis and to obtain consent for insertion.  Consent obtain by Ms Daniela Balbuena

## 2017-12-16 NOTE — ROUTINE PROCESS
67  MD, at bedside. Hemodialysis cath insertion completed. No bleeding noted, at this time. Dressing is clean and intact. Hemodialysis cath located at right femoral. I the nurse, will continue to monitor.

## 2017-12-16 NOTE — PROGRESS NOTES
RENAL DAILY PROGRESS NOTE    Subjective:   Admitted for mental status change seen for ROXANNE    Complaint: does not want to eat, denies any CP or SOB    Current Facility-Administered Medications   Medication Dose Route Frequency    calcium gluconate 2 g in 0.9% sodium chloride 100 mL IVPB  2 g IntraVENous ONCE    0.9% sodium chloride infusion  100 mL/hr IntraVENous DIALYSIS PRN    alteplase (CATHFLO) 2 mg in sterile water (preservative free) 2 mL injection  2 mg InterCATHeter ONCE PRN    heparin (porcine) 1,000 unit/mL injection 1,000 Units  1,000 Units InterCATHeter DIALYSIS PRN    vancomycin (VANCOCIN) 500 mg in 0.9% sodium chloride (MBP/ADV) 100 mL MBP  500 mg IntraVENous Q48H    metroNIDAZOLE (FLAGYL) IVPB premix 500 mg  500 mg IntraVENous Q6H    cefTRIAXone (ROCEPHIN) 2 g in sterile water (preservative free) 20 mL IV syringe  2 g IntraVENous Q24H    sodium chloride (NS) flush 5-10 mL  5-10 mL IntraVENous PRN    dextrose 5 % - 0.45% NaCl infusion  125 mL/hr IntraVENous CONTINUOUS    0.9% sodium chloride infusion 250 mL  250 mL IntraVENous PRN    NOREPINephrine (LEVOPHED) 8 mg in 5% dextrose 250mL infusion  2-16 mcg/min IntraVENous TITRATE    VANCOMYCIN INFORMATION NOTE   Other Rx Dosing/Monitoring    insulin lispro (HUMALOG) injection   SubCUTAneous Q6H    glucose chewable tablet 16 g  4 Tab Oral PRN    glucagon (GLUCAGEN) injection 1 mg  1 mg IntraMUSCular PRN    dextrose (D50W) injection syrg 12.5-25 g  25-50 mL IntraVENous PRN    famotidine (PF) (PEPCID) 20 mg in sodium chloride 0.9 % 10 mL injection  20 mg IntraVENous DAILY           Objective:   Patient Vitals for the past 24 hrs:   Temp Pulse Resp BP SpO2   12/16/17 0600 - 89 28 122/89 -   12/16/17 0500 - 83 19 123/83 -   12/16/17 0400 97.5 °F (36.4 °C) 88 28 115/81 -   12/16/17 0300 - 87 27 118/81 -   12/16/17 0200 - 91 30 116/85 -   12/16/17 0100 - 88 28 111/85 -   12/16/17 0000 97.7 °F (36.5 °C) 92 25 113/83 (!) 73 %   12/15/17 2300 - 92 24 112/80 -   12/15/17 2200 - 89 21 120/88 -   12/15/17 2100 - 94 25 114/81 -   12/15/17 2013 97.5 °F (36.4 °C) - - - -   12/15/17 2000 97.5 °F (36.4 °C) 90 19 125/83 -   12/15/17 1930 - 95 25 118/86 -   12/15/17 1915 - 92 25 116/81 -   12/15/17 1900 - 94 24 112/79 -   12/15/17 1846 - 92 24 - 97 %   12/15/17 1845 - 89 24 107/78 -   12/15/17 1830 - 92 25 119/85 -   12/15/17 1829 - 92 29 - 100 %   12/15/17 1815 - 88 23 114/82 -   12/15/17 1800 96.6 °F (35.9 °C) 90 24 108/74 100 %   12/15/17 1747 - 96 28 - 96 %   12/15/17 1745 - 92 29 111/77 -   12/15/17 1730 - 91 28 114/78 100 %   12/15/17 1715 - 92 21 107/79 -   12/15/17 1700 - 89 19 108/76 100 %   12/15/17 1645 - 96 30 121/80 97 %   12/15/17 1630 - 85 19 113/78 -   12/15/17 1615 - 88 21 114/76 -   12/15/17 1604 - 86 21 112/81 -   12/15/17 1515 - 93 25 110/77 93 %   12/15/17 1505 - 94 27 - 98 %   12/15/17 1500 97.3 °F (36.3 °C) 95 30 103/77 -   12/15/17 1445 - 99 (!) 31 107/80 98 %   12/15/17 1430 - 99 29 107/77 98 %   12/15/17 1418 - 96 22 - 99 %   12/15/17 1415 - 92 20 109/76 100 %   12/15/17 1400 97.2 °F (36.2 °C) 98 (!) 31 115/76 100 %   12/15/17 1345 - 90 23 130/78 -   12/15/17 1330 - 88 23 109/67 100 %   12/15/17 1325 97.2 °F (36.2 °C) - - - -   12/15/17 1324 - 89 29 111/68 -   12/15/17 1319 - 91 30 - 97 %   12/15/17 1318 - 89 (!) 32 - 99 %   12/15/17 1316 - 95 30 (!) 80/55 -   12/15/17 1315 - 97 (!) 31 (!) 86/60 -   12/15/17 1300 97.2 °F (36.2 °C) 92 30 96/58 99 %   12/15/17 1245 - 96 26 101/75 99 %   12/15/17 1230 - 95 27 104/82 99 %   12/15/17 1215 - 88 25 91/66 98 %   12/15/17 1200 97.3 °F (36.3 °C) 90 24 103/72 98 %   12/15/17 1145 - 85 20 101/70 99 %   12/15/17 1130 - 94 27 109/78 98 %   12/15/17 1115 - 93 27 109/75 100 %        Weight change:      12/14 1901 - 12/16 0700  In: 4778.2 [P.O.:60; I.V.:4044.9]  Out: 825 [Urine:825]    Intake/Output Summary (Last 24 hours) at 12/16/17 1104  Last data filed at 12/16/17 0700   Gross per 24 hour   Intake 3740.01 ml   Output              310 ml   Net          3430.01 ml     Physical Exam: awake, emaciated, appears in mild resp distress    HEENT: non icteric  Neck: No JVD   Cardiovascular: regular with PVC  C/L: dec breath sounds  Abdomen: + tenderness, + bs  Ext: no edema    Data Review:     LABS:   Hematology: Recent Labs      12/16/17   0400  12/15/17   2200  12/15/17   0435  12/14/17   2149 12/14/17   1706   WBC  8.1   --   3.4*  3.4*  4.2*   HGB  10.5*  10.6*  10.7*  5.4*  6.3*   HCT  29.4*  29.4*  30.9*  15.8*  18.2*   pl 56K  Chemistry: Recent Labs      12/16/17   0400  12/15/17   2200  12/15/17   0435 12/14/17 2149 12/14/17   1706   BUN  194*  179*  194*  188*  210*   CREA  3.87*  3.87*  3.90*  4.05*  4.45*   CA  5.9*  6.0*  6.6*  6.2*  7.1*   ALB  2.1*   --   1.9*  1.7*  2.1*   K  4.2  4.4  4.8  4.8  5.0   NA  141  140  142  143  143   CL  109*  108  109*  110*  106   CO2  16*  14*  14*  14*  16*   PHOS   --    --   9.3*   --    --    GLU  90  84  123*  154*  39*    Vanco 17.6  Lactic 2.3    CULTURE: Urine 80 K col Strep  Blood c/s neg so far  CT scan kidneys non obstructed and UB is collapsed around [place]    IMPRESSION AND PLAN:   ROXANNE from [facility], urine output appears to be increasing some but he still  severely azotemic and acidotic. Pts agrees to HD and his POA has also given permission for HD. Cont to monitor for renal recovery trend I/O, avoid nephrotoxic drugs and trend Vanco levels.  Sched HD later today and most likely again tomorrow  Hypotension cont with vasopressors and IVF titrate off as tolerated  Anemia post BT cont to trend  Hyperphos 2 to ROXANNE repeat and start phos binder  CKD stage 3 eval for 2nd HPT  Hep B S Ag + defer to ID/GI  Low platelets, appears to be chronic         Jc Dixon MD  12/16/2017

## 2017-12-16 NOTE — PROGRESS NOTES
[facility]  -  Progress Note      Patient: Jad Baker MRN: [id number]  SSN: [id number]    YOB: 1935  Age: 80 y.o. Sex: male      Admit Date: 12/14/2017    Assessment:     -Shock septic versus hypovolemic.  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  -Hypothermia, on elenita hugger.  -Hypotension, on pressors.  -Indeterminate troponin, not consistent with ACS, likely secondary demand ischemia, initial ECGs with likely rose wave, follow up ECG once warmed with nonspecific changes.  -Severe anemia with Hgb 5.4 requiring transfusion, stool occult positive on admission.  -Acute on chronic kidney injury with creatinine 4.45 on admission.  -Elevated LFTs. H/o cirrhosis and liver mass suspicious for hepatoma, negative pathology on recent biopsy.  -Lactic acidosis. -Chronic thrombocytopenia. -H/o poorly controlled HTN. -Diabetes Mellitus.  -No ischemia on nuclear 4/25/2014  -Normal LV function EF 55% on echo 11/2017 with history of [address] with EF 45% 4/2017.  -Severe pulmonary hypertension  -Possible small pericardial effusion on echo 11/25/2017.  -Tobacco abuse. -H/o noncompliance.     No routine cardiology follow up. Plan:     1. Continue supportive care with pressors and antibiotics. 2. Continue fluid resuscitation  3. Catheters placed for dialysis earlier today  4. Nothing further to offer at this juncture so will sign off for now. Subjective:     No new complaints. Speaking with garbled speech today. Echo yesterday without any clear evidence of vegetation, but with severe pulmonary hypertension. Objective:      Echocardiogram yesterday-  1. EF 50-55%. 2. Severe TR  3.  No clear vegetations appreciated  4. RV pressures markedly elevated at 95%  Patient Vitals for the past 8 hrs:   Pulse Resp BP   12/16/17 0600 89 28 122/89         Patient Vitals for the past 96 hrs:   Weight   12/14/17 1819 49.9 kg (110 lb) Intake/Output Summary (Last 24 hours) at 12/16/17 1341  Last data filed at 12/16/17 0700   Gross per 24 hour   Intake          3540.01 ml   Output              270 ml   Net          3270.01 ml       Physical Exam:  General:  alert, cooperative, no distress, appears stated age  Neck:  Mild JVD  Lungs:  clear to auscultation bilaterally  Heart:  regular rate and rhythm, S1, S2 normal, no murmur, click, rub or gallop  Abdomen:  abdomen is soft without significant tenderness, masses, organomegaly or guarding  Extremities:  extremities normal, atraumatic, no cyanosis or edema    Data Review:     Labs: Results:       Chemistry Recent Labs      12/16/17   0400  12/15/17   2200  12/15/17   0435  12/14/17 2149   GLU  90  84  123*  154*   NA  141  140  142  143   K  4.2  4.4  4.8  4.8   CL  109*  108  109*  110*   CO2  16*  14*  14*  14*   BUN  194*  179*  194*  188*   CREA  3.87*  3.87*  3.90*  4.05*   CA  5.9*  6.0*  6.6*  6.2*   MG   --    --   2.8*   --    PHOS   --    --   9.3*   --    AGAP  16  18  19*  19*   BUCR  50*  46*  50*  46*   AP  431*   --   533*  421*   TP  4.4*   --   4.6*  3.9*   ALB  2.1*   --   1.9*  1.7*   GLOB  2.3   --   2.7  2.2   AGRAT  0.9   --   0.7*  0.8      CBC w/Diff Recent Labs      12/16/17 0400  12/15/17   2200  12/15/17   0435  12/14/17 2149   WBC  8.1   --   3.4*  3.4*   RBC  3.35*   --   3.40*  1.57*   HGB  10.5*  10.6*  10.7*  5.4*   HCT  29.4*  29.4*  30.9*  15.8*   PLT  56*   --   82*  92*   GRANS  85*   --   75  67   LYMPH  1*   --   10*  32   EOS  0   --   0  0      Cardiac Enzymes Lab Results   Component Value Date/Time    CPK 2299 (H) 12/15/2017 03:00 PM    CKMB 56.5 (H) 12/15/2017 03:00 PM    CKND1 2.5 12/15/2017 03:00 PM    TROIQ 0.90 (H) 12/16/2017 04:00 AM    TROIQ 0.61 (H) 12/15/2017 03:00 PM      Coagulation Recent Labs      12/15/17   0455  12/14/17   1706   PTP  20.9*  19.5*   INR  1.9*  1.7*   APTT  37.3*  36.3       Lipid Panel Lab Results   Component Value Date/Time    Cholesterol, total 214 04/24/2017 10:15 AM    HDL Cholesterol 43 04/24/2017 10:15 AM    LDL, calculated 133.4 04/24/2017 10:15 AM    VLDL, calculated 37.6 04/24/2017 10:15 AM    Triglyceride 188 04/24/2017 10:15 AM    CHOL/HDL Ratio 5.0 04/24/2017 10:15 AM      BNP No results found for: BNP, BNPP, XBNPT   Liver Enzymes Recent Labs      12/16/17   0400   TP  4.4*   ALB  2.1*   AP  431*   SGOT  197*      Digoxin    Thyroid Studies Lab Results   Component Value Date/Time    TSH 2.17 12/15/2017 04:35 AM          Edy Smith, DO   December 16, 2017

## 2017-12-16 NOTE — ROUTINE PROCESS
Dr Michael Garcia return call from paged and was informed. MD will be back to reposition cath. Dialysis nurse informed.

## 2017-12-16 NOTE — ROUTINE PROCESS
Bedside and Verbal shift change report given to [place] (oncoming nurse) by Mery Shoemaker RN   (offgoing nurse). Report included the following information SBAR, Med Rec Status and Cardiac Rhythm . Chad Buenrostro

## 2017-12-16 NOTE — PROGRESS NOTES
Brief GI note:    Was called by radiology. The pt has some air in the wall of colon at cecum and ascending, suspicious for ischemia, though there is no mural thickening. I think it may be a little unusual to see ischemic colitis in that area given the circulation is still fairly good in that area, never the less, it could potentially explain bacteremia. Will order AAS for tomorrow to look for any progression. Would not plan any surgical GI intervention at this point.  Prognosis appears guarded to me:

## 2017-12-16 NOTE — PROGRESS NOTES
[facility], [address] [facility]  [email or web address]         Impression/Plan:  1. Sepsis- etiology unclear but includes gut/biliary tree as well as endocarditis. 2. Liver mass, bx negative for neoplasm last month but circumstances c/w HCC or CholangioCa in setting of cirrhosis  3. Generalized debilitation  4. Pt's status changed to DNR last pm after ICU discussions with family. Plan:  1. No immediate plan for GI/IR intervention to readdress the liver mass. 2. Cont to treat sepsis, if he improves , will consider renewed intervention options. 3. Await repeat CT readings. 4, Consideration for palliative care consult. Chief Complaint: Mild abd discomfort      Subjective:  Pt is very weak and slt somnolent  But responds appropriately to questions. Abd is soft w/o rebound. Noted the change in his status.          Eyes: conjunctiva normal, EOM normal   Neck: ROM normal, supple and trachea normal   Cardiovascular: heart normal, intact distal pulses, normal rate and regular rhythm   Pulmonary/Chest Wall: breath sounds normal and effort normal   Abdominal: appearance normal, bowel sounds normal and soft, non-acute, non-tender                Visit Vitals    /89    Pulse 89    Temp 97.5 °F (36.4 °C)    Resp 28    Ht 5' 7\" (1.702 m)    Wt 49.9 kg (110 lb)    SpO2 (!) 73%    BMI 17.23 kg/m2           Intake/Output Summary (Last 24 hours) at 12/16/17 0850  Last data filed at 12/16/17 0700   Gross per 24 hour   Intake          4081.74 ml   Output              385 ml   Net          3696.74 ml       CBC w/Diff    Lab Results   Component Value Date/Time    WBC 8.1 12/16/2017 04:00 AM    RBC 3.35 (L) 12/16/2017 04:00 AM    HGB 10.5 (L) 12/16/2017 04:00 AM    HCT 29.4 (L) 12/16/2017 04:00 AM    MCV 87.8 12/16/2017 04:00 AM    MCH 31.3 12/16/2017 04:00 AM    MCHC 35.7 12/16/2017 04:00 AM    RDW 20.0 (H) 12/16/2017 04:00 AM    PLT 56 (L) 12/16/2017 04:00 AM    Lab Results   Component Value Date/Time    GRANS 85 (H) 12/16/2017 04:00 AM    LYMPH 1 (L) 12/16/2017 04:00 AM    EOS 0 12/16/2017 04:00 AM    BANDS 10 (H) 12/16/2017 04:00 AM    BASOS 0 12/16/2017 04:00 AM    METAS 1 (H) 12/16/2017 04:00 AM      Basic Metabolic Profile   Recent Labs      12/16/17   0400   12/15/17   0435   NA  141   < >  142   K  4.2   < >  4.8   CL  109*   < >  109*   CO2  16*   < >  14*   BUN  194*   < >  194*   CA  5.9*   < >  6.6*   MG   --    --   2.8*   PHOS   --    --   9.3*    < > = values in this interval not displayed. Hepatic Function    Lab Results   Component Value Date/Time    ALB 2.1 (L) 12/16/2017 04:00 AM    TP 4.4 (L) 12/16/2017 04:00 AM     (H) 12/16/2017 04:00 AM    Lab Results   Component Value Date/Time    SGOT 197 (H) 12/16/2017 04:00 AM        @[email or web address](CULT:3)                 )Jake Bass MD, MD  [facility], [address] [place] [address]  Pager [phone]  [email or web address]

## 2017-12-16 NOTE — ROUTINE PROCESS
Dialysis nurse in at bedside. Nurse tempt to test HD cath. Not a good blood return noted. Nurse said it is sluggished and hard to pull. Dr Domenica Falk to be notified and paged.

## 2017-12-16 NOTE — PROGRESS NOTES
1900:  Sister Brenden Padilla, and niece Janie Granados, arrived to [address] [facility] ICU unit now. Cody paged via service to notify of family's arrival to unit. 1930:  Bedside and Verbal shift change report given to [facility] (oncoming nurse) by John Middleton RN   (offgoing nurse). Report included the following information SBAR, Kardex, Intake/Output, MAR, Accordion, Recent Results, Cardiac Rhythm NSR. and Alarm Parameters    1940:  No return call from ICU provider; repaged via service to notify of family's arrival to ICU unit. Receiving RN updated.

## 2017-12-17 NOTE — ROUTINE PROCESS
Received pt in bed alert but confused. Voiced no complaint. Left groin with 1/2 4X4 guaze dressing with transparent covering from recent attempt at dialysis. Left groin with some serus drainage. Right groin  3 lumen dialysis cath recently inserted. postive popliteal pulses bilateral to lower extremities. Call bell within reach. Bed low and locked. Will continue to monitor    2100 100% Nonrebreather mask, Placed to checked O2 sat. O2 sat by monitor would not pick. Tried Forehead unsuccessful with O2 sat. Bedside and Verbal shift change report given to [address] (oncoming nurse) by Jeny Carranza RN   (offgoing nurse). Report included the following information SBAR, Kardex, Intake/Output and MAR.

## 2017-12-17 NOTE — PROGRESS NOTES
. Pt neuro status altered and lethargic. Unable to take po at this time. Unable to get oral or axillary temp. Rectal temp probe inserted. Temp 32 C. [facility]. behr hugger already in place on pt. Turned up to high heat and extra blankets applied.  Consent obtained by telephone by Ms Garrett Mathew for exchange of dialysis catheter by Dr Herminio Jay

## 2017-12-17 NOTE — PROGRESS NOTES
Death [address] [facility]      Patient lying in bed, mouth open, eyes closed. Pupils fixed and equal bilaterally. No response to verbal or painful stimuli. No heart or lung sounds auscultated. No carotid or peripheral pulses.     Death officially pronounced at [phone], 12/17/2017        Deyvi Marie MD

## 2017-12-17 NOTE — PROGRESS NOTES
RENAL DAILY PROGRESS NOTE    Subjective:   Admitted for mental status change seen for ROXANNE    Complaint: denies any CP or SOB, tolerated FFP, could not dialyze last night due to cath malfxn.     Current Facility-Administered Medications   Medication Dose Route Frequency    0.9% sodium chloride infusion 250 mL  250 mL IntraVENous PRN    calcium gluconate 2 g in 0.9% sodium chloride 100 mL IVPB  2 g IntraVENous ONCE    0.9% sodium chloride infusion 250 mL  250 mL IntraVENous PRN    0.9% sodium chloride infusion  100 mL/hr IntraVENous DIALYSIS PRN    alteplase (CATHFLO) 2 mg in sterile water (preservative free) 2 mL injection  2 mg InterCATHeter ONCE PRN    heparin (porcine) 1,000 unit/mL injection 1,000 Units  1,000 Units InterCATHeter DIALYSIS PRN    calcium acetate (PHOSLYRA) ORAL SOLUTION 1,334 mg  10 mL Oral TID WITH MEALS    metroNIDAZOLE (FLAGYL) IVPB premix 500 mg  500 mg IntraVENous Q6H    cefTRIAXone (ROCEPHIN) 2 g in sterile water (preservative free) 20 mL IV syringe  2 g IntraVENous Q24H    sodium chloride (NS) flush 5-10 mL  5-10 mL IntraVENous PRN    dextrose 5 % - 0.45% NaCl infusion  80 mL/hr IntraVENous CONTINUOUS    0.9% sodium chloride infusion 250 mL  250 mL IntraVENous PRN    NOREPINephrine (LEVOPHED) 8 mg in 5% dextrose 250mL infusion  2-16 mcg/min IntraVENous TITRATE    VANCOMYCIN INFORMATION NOTE   Other Rx Dosing/Monitoring    insulin lispro (HUMALOG) injection   SubCUTAneous Q6H    glucose chewable tablet 16 g  4 Tab Oral PRN    glucagon (GLUCAGEN) injection 1 mg  1 mg IntraMUSCular PRN    dextrose (D50W) injection syrg 12.5-25 g  25-50 mL IntraVENous PRN    famotidine (PF) (PEPCID) 20 mg in sodium chloride 0.9 % 10 mL injection  20 mg IntraVENous DAILY           Objective:     Patient Vitals for the past 24 hrs:   Temp Pulse Resp BP SpO2   12/17/17 1031 - - - - 98 %   12/17/17 0930 - 69 18 (!) 133/100 -   12/17/17 0900 (!) 89.6 °F (32 °C) 71 19 116/78 -   12/17/17 0830 - 62 13 107/69 -   12/17/17 0800 - 66 14 (!) 120/96 -   12/17/17 0730 - 63 12 115/87 -   12/17/17 0600 - 62 17 (!) 123/92 -   12/17/17 0500 - 70 17 93/73 -   12/17/17 0400 - 70 18 102/78 -   12/17/17 0300 - 73 18 122/87 -   12/17/17 0200 - 68 24 (!) 129/94 -   12/17/17 0100 - 75 20 (!) 79/64 -   12/17/17 0000 - 70 13 113/81 -   12/16/17 2300 - 72 16 114/70 -   12/16/17 2200 - 70 18 117/84 -   12/16/17 2000 97 °F (36.1 °C) 85 25 97/72 -   12/16/17 1930 - 85 23 105/76 -   12/16/17 1900 - 81 21 (!) 160/131 -   12/16/17 1830 - 86 22 90/77 -   12/16/17 1800 - 88 25 (!) 88/57 (!) 60 %   12/16/17 1730 - 87 28 (!) 87/65 -   12/16/17 1700 - 85 26 (!) 87/76 (!) 72 %   12/16/17 1630 - 93 24 90/56 (!) 78 %   12/16/17 1600 97.4 °F (36.3 °C) 89 21 96/72 (!) 47 %   12/16/17 1530 - 86 18 (!) 88/71 (!) 20 %   12/16/17 1500 - 83 12 (!) 88/70 -   12/16/17 1430 - 83 23 (!) 82/65 -   12/16/17 1400 - 83 20 114/87 -   12/16/17 1330 - 91 26 130/89 (!) 53 %   12/16/17 1300 - 85 15 119/86 -   12/16/17 1230 - 86 21 119/86 -   12/16/17 1200 97.5 °F (36.4 °C) 90 23 120/83 95 %   12/16/17 1130 - 81 11 (!) 135/96 -   12/16/17 1100 - 83 15 (!) 135/98 (!) 72 %        Weight change:      12/15 1901 - 12/17 0700  In: 4260.6 [P.O.:150;  I.V.:4110.6]  Out: 720 [Urine:720]    Intake/Output Summary (Last 24 hours) at 12/17/17 1048  Last data filed at 12/17/17 0925   Gross per 24 hour   Intake          2858.48 ml   Output              220 ml   Net          2638.48 ml     Physical Exam: awake, emaciated, appears in mild resp distress    HEENT: non icteric  Neck: No JVD   Cardiovascular: regular with PVC  C/L: dec breath sounds  Abdomen: + tenderness, + bs  Ext: no edema, right fem temp cath    Data Review:     LABS:   Hematology:   Recent Labs      12/17/17   0339  12/16/17   0400  12/15/17   2200  12/15/17   0435  12/14/17   2149  12/14/17   1706   WBC  7.1  8.1   --   3.4*  3.4*  4.2*   HGB  7.6*  10.5*  10.6*  10.7*  5.4*  6.3*   HCT  20.9*  29.4*  29.4* 30.9*  15.8*  18.2*   pl 49K  Chemistry:   Recent Labs      12/17/17   0317  12/16/17   1515  12/16/17   0400  12/15/17   2200  12/15/17   0435  12/14/17   2149  12/14/17   1706   BUN  198*   --   194*  179*  194*  188*  210*   CREA  3.95*   --   3.87*  3.87*  3.90*  4.05*  4.45*   CA  5.5*  6.1*  5.9*  6.0*  6.6*  6.2*  7.1*   ALB  1.6*   --   2.1*   --   1.9*  1.7*  2.1*   K  4.0   --   4.2  4.4  4.8  4.8  5.0   NA  141   --   141  140  142  143  143   CL  110*   --   109*  108  109*  110*  106   CO2  13*   --   16*  14*  14*  14*  16*   PHOS   --   8.8*   --    --   9.3*   --    --    GLU  131*   --   90  84  123*  154*  39*    Vanco 19.2  Lactic 3.8  IPTH 1208  Vit D 7.6  CULTURE: Urine 80 K col Strep  Blood c/s neg so far  CT scan kidneys non obstructed and UB is collapsed around cornell    IMPRESSION AND PLAN:   ROXANNE from ATN, urine output appears to be increasing some but he still  severely azotemic and acidotic. New HD cath placed will sched HD today and daily for solute clearance. Will not do any fluid removal today. Cont to monitor for renal recovery trend I/O, avoid nephrotoxic drugs and trend Vanco levels. 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  Start po calcitriol, inc phoslyra  Hep B S Ag + defer to ID/GI  Low platelets, appears to be chronic will use citrate lock         Duke Renteria MD  12/17/2017

## 2017-12-17 NOTE — PROGRESS NOTES
NUTRITION    Nursing Referral: [facility]      RECOMMENDATIONS / PLAN:     - Continue po diet and supplements. Encourage and monitor po intake  - Add 'low phosphorus' restriction to diet   - If pt unable to tolerate adequate po intake, consider NGT placement to provide supplemental enteral nutrition   - Continue RD inpatient monitoring and evaluation. NUTRITION INTERVENTIONS & DIAGNOSIS:     [x] Meals/Snacks: modified diet  [x] Medical food supplementation: Glucerna Shake TID  [x] Coordination of care: pt discussed with MD; will hold off on NGT placement and supplemental tube feeding at this time per MD    Nutrition Diagnosis: Underweight related to inadequate energy intake as evidenced by BMI of 17 kg/m^2. Altered nutrition-related laboratory value (Phosphorus) related to impaired kidney function as evidenced by Phos 8.8 on 12/16/17    ASSESSMENT:     12/17: Pt with poor po intake per MD. Discussed NGT placement and starting supplementation tube feeding; will hold off at this time per MD. Pt having decreased alertness, may improve following HD per MD. HD planned for today; cancelled yesterday. Per MD note, noted possible transition to comfort care if patient's status starts deteriorating. Hyperphosphatemia; phosphorus level 8.8 on 12/16    12/15: Pt not interested in clear liquids this morning, lethargic and unable to report weight or appetite history. Advanced to soft solid diet this afternoon.      Average po intake adequate to meet patients estimated nutritional needs:   [] Yes     [x] No   [] Unable to determine at this time    Diet: DIET DENTAL SOFT (SOFT SOLID)  DIET NUTRITIONAL SUPPLEMENTS All Meals; Soy Edwar 57 Allergies: NKFA  Current Appetite:   [] Good     [] Fair     [x] Poor     [] Other:   Appetite/meal intake prior to admission:   [] Good    [] Fair     [] Poor     [x] Other: unknown   Feeding Limitations:  [] Swallowing difficulty    [] Chewing difficulty    [] Other:  Current Meal Intake:   Patient Vitals for the past 100 hrs:   % Diet Eaten   12/17/17 0855 0 %   12/15/17 1805 5 %   12/15/17 1300 0 %   12/15/17 0800 0 %   0% of breakfast on 12/15. BM: 12/16   Skin Integrity: WDL  Edema: none   Pertinent Medications: Reviewed: SSI, levophed, calcium    Recent Labs      12/17/17   0317  12/16/17   1515  12/16/17   0400  12/15/17   2200  12/15/17   0435   NA  141   --   141  140  142   K  4.0   --   4.2  4.4  4.8   CL  110*   --   109*  108  109*   CO2  13*   --   16*  14*  14*   GLU  131*   --   90  84  123*   BUN  198*   --   194*  179*  194*   CREA  3.95*   --   3.87*  3.87*  3.90*   CA  5.5*  6.1*  5.9*  6.0*  6.6*   MG   --    --    --    --   2.8*   PHOS   --   8.8*   --    --   9.3*   ALB  1.6*   --   2.1*   --   1.9*   SGOT  147*   --   197*   --   359*   ALT  116*   --   139*   --   180*       Intake/Output Summary (Last 24 hours) at 12/17/17 1442  Last data filed at 12/17/17 1312   Gross per 24 hour   Intake          2858.48 ml   Output              495 ml   Net          2363.48 ml       Anthropometrics:  Ht Readings from Last 1 Encounters:   12/14/17 5' 7\" (1.702 m)     Last 3 Recorded Weights in this Encounter    12/14/17 1819 12/16/17 1958 12/17/17 0207   Weight: 49.9 kg (110 lb) 59.6 kg (131 lb 6.3 oz) 58.8 kg (129 lb 10.1 oz)     Body mass index is 20.3 kg/(m^2).       Underweight     Weight History: weight loss over the past year per RN; 30 lb, 21% weight loss x 5 years per chart       Weight Metrics 12/17/2017 12/1/2017 11/23/2017 11/18/2017 4/27/2017 8/24/2012   Weight 129 lb 10.1 oz 107 lb 8 oz 105 lb 100 lb 113 lb 140 lb   BMI 20.3 kg/m2 17.89 kg/m2 17.47 kg/m2 16.64 kg/m2 18.8 kg/m2 23.3 kg/m2        Admitting Diagnosis: Septic shock (HCC)  Anemia  STEMI (ST elevation myocardial infarction) ([facility] [address]  Pertinent PMHx: DM, HTN, cirrhosis     Education Needs:        [x] None identified  [] Identified - Not appropriate at this time  []  Identified and addressed - refer to education log  Learning Limitations:   [] None identified  [x] Identified: lethargic, confused     Cultural, Latter day & ethnic food preferences:  [x] None identified    [] Identified and addressed     ESTIMATED NUTRITION NEEDS:     Calories: 3934-6552 kcal (30-33 kcal/kg) based on  [] Actual BW      [x] SBW 69 kg  Protein:  gm (1.2-1.5 gm/kg) based on  [] Actual BW      [x] SBW   Fluid: 1 mL/kcal     MONITORING & EVALUATION:     Nutrition Goal(s):   1. Po intake of meals will meet >75% of patient estimated nutritional needs within the next 7 days.   Outcome:  [] Met/Ongoing    [x]  Not Met/Progressing    [] New/Initial Goal     Monitoring:   [x] Diet tolerance   [x] Meal intake   [x] Supplement intake   [] GI symptoms/ability to tolerate po diet   [] Respiratory status   [] Plan of care      Previous Recommendations (for follow-up assessments only):     []   Implemented       [x]   Not Implemented (RD to address)      [] No Longer Appropriate     [] No Recommendation Made     Discharge Planning: cardiac, diabetic diet   [x] Participated in care planning, discharge planning, & interdisciplinary rounds as appropriate      Ba Alexandra, [address] [place] [address]   Pager: [phone]

## 2017-12-17 NOTE — PROGRESS NOTES
[facility], [address] [place] [address]  [email or web address]         Impression/Plan:  1. Sepsis - improving clinically  2. Anemia - stable  3. STEMI- stable  4. Some intramural air on Abd CT seen yesterday, Abd exam benign, AAS still pending today. Plan:  1. Cont supportive care. 2. F/u AAS to ensure no progression or free air. 3. No Gi interventions planned. Chief Complaint: Sepsis      Subjective:  Arrousable, more comfortable today. No complaints. AAS pending.         Eyes: conjunctiva normal, EOM normal   Neck: ROM normal, supple and trachea normal   Cardiovascular: heart normal, intact distal pulses, normal rate and regular rhythm   Pulmonary/Chest Wall: breath sounds normal and effort normal   Abdominal: appearance normal, bowel sounds normal and soft, non-acute, non-tender                Visit Vitals    BP (!) 123/92    Pulse 62    Temp 97 °F (36.1 °C)    Resp 17    Ht 5' 7\" (1.702 m)    Wt 58.8 kg (129 lb 10.1 oz)    SpO2 (!) 60%    BMI 20.3 kg/m2           Intake/Output Summary (Last 24 hours) at 12/17/17 0928  Last data filed at 12/17/17 0206   Gross per 24 hour   Intake          2558.48 ml   Output              220 ml   Net          2338.48 ml       CBC w/Diff    Lab Results   Component Value Date/Time    WBC 7.1 12/17/2017 03:39 AM    RBC 2.35 (L) 12/17/2017 03:39 AM    HGB 7.6 (L) 12/17/2017 03:39 AM    HCT 20.9 (L) 12/17/2017 03:39 AM    MCV 88.9 12/17/2017 03:39 AM    MCH 32.3 12/17/2017 03:39 AM    MCHC 36.4 12/17/2017 03:39 AM    RDW 19.7 (H) 12/17/2017 03:39 AM    PLT 49 (L) 12/17/2017 03:39 AM    Lab Results   Component Value Date/Time    GRANS 74 12/17/2017 03:39 AM    LYMPH 7 (L) 12/17/2017 03:39 AM    EOS 0 12/17/2017 03:39 AM    BANDS 17 (H) 12/17/2017 03:39 AM    BASOS 0 12/17/2017 03:39 AM    METAS 1 (H) 12/17/2017 03:39 AM      Basic Metabolic Profile   Recent Labs      12/17/17   0317  12/16/17   1515   12/15/17   0435   NA  141 --    < >  142   K  4.0   --    < >  4.8   CL  110*   --    < >  109*   CO2  13*   --    < >  14*   BUN  198*   --    < >  194*   CA  5.5*  6.1*   < >  6.6*   MG   --    --    --   2.8*   PHOS   --   8.8*   --   9.3*    < > = values in this interval not displayed. Hepatic Function    Lab Results   Component Value Date/Time    ALB 1.6 (L) 12/17/2017 03:17 AM    TP 3.5 (L) 12/17/2017 03:17 AM     (H) 12/17/2017 03:17 AM    Lab Results   Component Value Date/Time    SGOT 147 (H) 12/17/2017 03:17 AM        @[email or web address](CULT:3)                 )Heydi De MD, MD  [facility], [address] [place] [address]  Pager [phone]  [email or web address]

## 2017-12-17 NOTE — DIALYSIS
ACUTE HEMODIALYSIS FLOW SHEET    HEMODIALYSIS ORDERS: Physician: jenni     Dialyzer: bernice        Duration: 2.5 hr  BFR: 250   DFR: 600   Dialysate:  Temp 37 K+   2    Ca+  3 Na 140 Bicarb 35   Weight:  58.8 kg    Bed Scale [x]     Unable to Obtain []      Dry weight/UF Goal: 0 Access CVL  Needle Gauge     Heparin []  Bolus      Units    [] Hourly       Units    [x]None      Catheter locking solution sodium citrate   Pre BP:   82/61    Pulse:     83     Temperature:   31  Respirations: 24  Tx: NS       ml/Bolus  Other        [] N/A   Labs: Pre        Post:        [x] N/A   Additional Orders(medications, blood products, hypotension management):       [] N/A     [x] [facility] Consent Verified     CATHETER ACCESS: []N/A   [x]Right   []Left   []IJ     [x]Fem   [] First use X-ray verified     [x]Tunnel                [] Non Tunneled   [x]No S/S infection  []Redness  []Drainage []Cultured []Swelling []Pain   [x]Medical Aseptic Prep Utilized   []Dressing Changed  [] Biopatch  Date:       []Clotted   [x]Patent   Flows: [x]Good  []Poor  []Reversed   If access problem,  notified: []Yes    []N/A  Date:           GRAFT/FISTULA ACCESS:  []N/A     []Right     []Left     []UE     []LE   []AVG   []AVF        []Buttonhole    []Medical Aseptic Prep Utilized   []No S/S infection  []Redness  []Drainage []Cultured []Swelling []Pain    Bruit:   [] Strong    [] Weak       Thrill :   [] Strong    [] Weak       Needle Gauge:    Length:     If access problem,  notified: []Yes     []N/A  Date:        Please describe access if present and not used:       GENERAL ASSESSMENT:    LUNGS:  Rate  SaO2%        [] N/A    [] Clear  [] Coarse  [] Crackles  [] Wheezing        [x] Diminished     Location : []RLL   []LLL    []RUL  []SCARLET   Cough: []Productive  []Dry  []N/A   Respirations:  []Easy  []Labored   Therapy:  [x]RA  []NC  l/min    Mask: []NRB []Venti       O2%                  []Ventilator  []Intubated  [] Trach  [] BiPaP CARDIAC: []Regular      [x] Irregular   [] Pericardial Rub  [] JVD        []  Monitored  [] Bedside  [] Remotely monitored [] N/A  Rhythm: sinus rhythm with occasional PVCs   EDEMA: [] None  [x]Generalized  [] Pitting [] 1    [] 2    [] 3    [] 4                 [] Facial  [] Pedal  []  UE  [] LE   SKIN:   [x] Warm  [] Hot     [] Cold   [x] Dry     [] Pale   [] Diaphoretic                  [] Flushed  [] Jaundiced  [] Cyanotic  [] Rash  [] Weeping   LOC:    [] Alert      []Oriented:    [] Person     [] Place  []Time               [x] Confused  [x] Lethargic  [] Medicated  [] Non-responsive     GI / ABDOMEN:  [] Flat    [] Distended    [x] Soft    [] Firm   []  Obese                             [] Diarrhea  [x] Bowel Sounds  [] Nausea  [] Vomiting       / URINE ASSESSMENT:[x] Voiding   [] Oliguria  [] Anuria   [x]  Christian     [] Incontinent    []  Incontinent Brief      []  Bathroom Privileges     PAIN: [x] 0 []1  []2   []3   []4   []5   []6   []7   []8   []9   []10            Scale 0-10  Action/Follow Up:    MOBILITY:  [] Amb    [] Amb/Assist    [x] Bed    [] Wheelchair  [] Stretcher      All Vitals and Treatment Details on Attached [address]: [facility]          Room # 316     [] 1st Time Acute  [x] Stat  [] Routine  [] Urgent     [] Acute Room  []  Bedside  [x] ICU/CCU  [] ER   Isolation Precautions:  [x] Dialysis   [] Airborne   [] Contact    [] Reverse   Special Considerations:         [x] Blood Consent Verified []N/A     ALLERGIES:   [x] NKA          Code Status:  [] Full Code  [x] DNR  [] Other           HBsAg ONLY: Date Drawn 12/16/17         []Negative [x]Positive []Unknown   HBsAb: Date 12/16/17    [] Susceptible   [x] [id number] []Not Drawn  [] Drawn     Current Labs:    Date of Labs: Today [x]        Cut and paste current labs here.                                                                                                                                   DIET:  [x] Renal    [] Other     [] NPO     []  Diabetic      PRIMARY NURSE REPORT: First initial/Last name/Title      Pre Dialysis: V Javier, RN    Time: 1400      EDUCATION:    [] Patient [] Other         Knowledge Basis: [x]None []Minimal [] Substantial   Barriers to learning confusion []N/A   [] Access Care     [] S&S of infection     [] Fluid Management     []K+     []Procedural    []Albumin     [] Medications     [] Tx Options     [] Transplant     [] Diet     [] Other   Teaching Tools:  [] Explain  [] Demo  [] Handouts [] Video  Patient response:   [] Verbalized understanding  [] Teach back  [] Return demonstration [x] Requires follow up   Inappropriate due to            [x] Time Out/Safety Check       [address] [place] [address] Before each treatment:     Machine Number:                   [facility]                                  [] Unit Machine #  with centralized RO                                  [x] Portable Machine #1/RO serial # [id number]                                  [] Portable Machine #2/RO serial # [id number]                                  [] Portable Machine #3/RO serial # [id number]                                                                                                       [facility]                                  [] Portable Machine #11/RO serial # [id number]                                   [] Portable Machine #12/RO serial # [id number]                                  [] Portable Machine #13/RO serial #  [id number]      Alarm Test:  Pass time 1400         Other:         [x] RO/Machine Log Complete      Temp    37            [x]Extracorporeal Circuit Tested for integrity   Dialysate: pH  7.4 Conductivity: Meter   14     HD Machine   14.2                  TCD: 14.2  Dialyzer Lot # [id number]            Blood Tubing Lot # [id number]         Saline Lot #  [id number]     CHLORINE TESTING-Before each treatment and every 4 hours    Total Chlorine: [x] less than 0.1 ppm  Time: 1430 4 Hr/2nd Check Time: n/a   (if greater than 0.1 ppm from Primary then every 30 minutes from Secondary)     TREATMENT INITIATION  with Dialysis Precautions:   [x] All Connections Secured                 [x] Saline Line Double Clamped   [x] Venous Parameters Set                  [x] Arterial Parameters Set    [x] Prime Given  250                              [x]Air Foam Detector Engaged      Treatment Initiation Note: femoral line accessed and treatment initiated without difficulty. 11 minutes into treatment, pt displayed many PVCs per minute, with heart rate labile between west 30s to 70s. Blood returned and dr Maura Arvizu phone and notified. Dr would like attempt to administer one UPRBC. Treatment resumed with one unit hung and BFR at 150; 15 minutes into treatment, heart rate again became labile and SBP in 70s. Pt is maxed on pressers. Leandra Dugan requesting to discontinue HD  Treatment. Dr Yulissa Fallon again phoned and she agrees to stop treatment at this time. Pt received approximately 150mL of blood transfusion. Medication Dose Volume Route Initials Dialyzer Cleared: [x] Good [] Fair  [] Poor    Blood processed:  6.6 L  UF Removed  0 Ml    Post Wt: 58.8    kg  POst BP:   80/56       Pulse: 80      Respirations: 19  Temperature: 32.2                                   Post Tx Vascular Access: AVF/AVG: Bleeding stopped Art  min. Brent. Min   N/A                                   Catheter: Locking solution: sodium citrate Art. 1.7  Brent.  1.6                                  Post Assessment:                                    Skin:  [x] Warm  [x] Dry [] Diaphoretic    [] Flushed  [] Pale [] Cyanotic   [facility] Signatures Title Initials  Time Lungs: [] Clear    [] Course  [] Crackles  [] Wheezing [x] Diminished       Cardiac: [] Regular   [x] Irregular   [] Monitor  [] N/A  Rhythm:   Sinus rythm         Edema:  [] None    [x] General     [] Facial   [] Pedal    [] UE    [] LE       Pain: [x]0  []1  []2   []3 []4   []5   []6   []7   []8   []9   []10         Post Treatment Note: pt completed less than one hour of total treatment. Pt received half a unit of PRBCs. Vital signs show significant improvement after treatment termination.       POST TREATMENT PRIMARY NURSE HANDOFF REPORT:     First initial/Last name/Title         Post Dialysis: CHRISTIE Herrera, RN Time:  1700     Abbreviations: AVG-arterial venous graft, AVF-arterial venous fistula, IJ-Internal Jugular, Subcl-Subclavian, Fem-Femoral, Tx-treatment, AP/HR-apical heart rate, DFR-dialysate flow rate, BFR-blood flow rate, AP-arterial pressure, -venous pressure, UF-ultrafiltrate, TMP-transmembrane pressure, Brent-Venous, Art-Arterial, RO-Reverse Osmosis

## 2017-12-17 NOTE — PROGRESS NOTES
Subjective:  Symptoms:  Stable. No shortness of breath. Diet:  Poor intake. Activity level: Impaired due to weakness. Pain:  He complains of pain that is mild. Pain is poorly controlled. Patient awake and uncomfortable looking  He has remained on pressors overnight  Multiple attempt at HD catheter placement made but flow-through was insufficient for HD use  He has become more hypothermic overnight  No other issues overnight    Temp:  [86 °F (30 °C)-97.7 °F (36.5 °C)]   Pulse (Heart Rate):  [55-99]   BP: ()/()   Resp Rate:  [7-32]   O2 Sat (%):  [20 %-100 %]   Weight:  [49.9 kg (110 lb)-59.6 kg (131 lb 6.3 oz)]   12/17 0701 - 12/17 1900  In: 300   Out: -   12/15 1901 - 12/17 0700  In: 4260.6 [P.O.:150; I.V.:4110.6]  Out: 720 [Urine:720]      Objective:  General Appearance: In no acute distress, ill-appearing and uncomfortable. Vital signs: (most recent): Blood pressure (!) 133/100, pulse 69, temperature (!) 89.6 °F (32 °C), resp. rate 18, height 5' 7\" (1.702 m), weight 58.8 kg (129 lb 10.1 oz), SpO2 98 %. Output: Minimal urine output. Lungs:  Normal respiratory rate and normal effort. There are decreased breath sounds. Heart: Normal rate. (Distant)  Neurological: Patient is alert. Abdomen: Abdomen is soft and non-distended. Hypoactive bowel sounds. There is no abdominal tenderness.        Recent Results (from the past 12 hour(s))   METABOLIC PANEL, COMPREHENSIVE    Collection Time: 12/17/17  3:17 AM   Result Value Ref Range    Sodium 141 136 - 145 mmol/L    Potassium 4.0 3.5 - 5.5 mmol/L    Chloride 110 (H) 100 - 108 mmol/L    CO2 13 (L) 21 - 32 mmol/L    Anion gap 18 3.0 - 18 mmol/L    Glucose 131 (H) 74 - 99 mg/dL     (H) 7.0 - 18 MG/DL    Creatinine 3.95 (H) 0.6 - 1.3 MG/DL    BUN/Creatinine ratio 50 (H) 12 - 20      GFR est AA 18 (L) >60 ml/min/1.73m2    GFR est non-AA 15 (L) >60 ml/min/1.73m2    Calcium 5.5 (LL) 8.5 - 10.1 MG/DL    Bilirubin, total 2.4 (H) 0.2 - 1.0 MG/DL    ALT (SGPT) 116 (H) 16 - 61 U/L    AST (SGOT) 147 (H) 15 - 37 U/L    Alk. phosphatase 344 (H) 45 - 117 U/L    Protein, total 3.5 (L) 6.4 - 8.2 g/dL    Albumin 1.6 (L) 3.4 - 5.0 g/dL    Globulin 1.9 (L) 2.0 - 4.0 g/dL    A-G Ratio 0.8 0.8 - 1.7     VANCOMYCIN, RANDOM    Collection Time: 12/17/17  3:17 AM   Result Value Ref Range    Vancomycin, random 19.2 5.0 - 40.0 UG/ML   PROTHROMBIN TIME + INR    Collection Time: 12/17/17  3:17 AM   Result Value Ref Range    Prothrombin time 25.1 (H) 11.5 - 15.2 sec    INR 2.4 (H) 0.8 - 1.2     CBC WITH AUTOMATED DIFF    Collection Time: 12/17/17  3:39 AM   Result Value Ref Range    WBC 7.1 4.6 - 13.2 K/uL    RBC 2.35 (L) 4.70 - 5.50 M/uL    HGB 7.6 (L) 13.0 - 16.0 g/dL    HCT 20.9 (L) 36.0 - 48.0 %    MCV 88.9 74.0 - 97.0 FL    MCH 32.3 24.0 - 34.0 PG    MCHC 36.4 31.0 - 37.0 g/dL    RDW 19.7 (H) 11.6 - 14.5 %    PLATELET 49 (L) 501 - 420 K/uL    MPV 12.7 (H) 9.2 - 11.8 FL    NEUTROPHILS 74 42 - 75 %    BAND NEUTROPHILS 17 (H) 0 - 5 %    LYMPHOCYTES 7 (L) 20 - 51 %    MONOCYTES 1 (L) 2 - 9 %    EOSINOPHILS 0 0 - 5 %    BASOPHILS 0 0 - 3 %    METAMYELOCYTES 1 (H) 0 %    NRBC 4.0 (H) 0  WBC    ABS. NEUTROPHILS 6.5 1.8 - 8.0 K/UL    ABS. LYMPHOCYTES 0.5 (L) 0.8 - 3.5 K/UL    ABS. MONOCYTES 0.1 0 - 1.0 K/UL    ABS. EOSINOPHILS 0.0 0.0 - 0.4 K/UL    ABS.  BASOPHILS 0.0 0.0 - 0.06 K/UL    DF MANUAL      PLATELET COMMENTS DECREASED PLATELETS      RBC COMMENTS ANISOCYTOSIS  1+        RBC COMMENTS POLYCHROMASIA  1+        RBC COMMENTS POIKILOCYTOSIS  1+        RBC COMMENTS OVALOCYTES  1+        RBC COMMENTS LORENZO CELLS  2+       FFP, ALLOCATE    Collection Time: 12/17/17  5:00 AM   Result Value Ref Range    CALLED TO:       Deb Cuevas CCU AT 0053 ON 12/17/2017 BY [facility]    Unit number [id number]     Blood component type FP24H,THAW     Unit division 00     Status of unit ISSUED     Unit number [id number]     Blood component type FP24H,THAW     Unit division 00     Status of unit ISSUED    GLUCOSE, POC    Collection Time: 12/17/17  6:01 AM   Result Value Ref Range    Glucose (POC) 120 (H) 70 - 110 mg/dL   LACTIC ACID    Collection Time: 12/17/17  9:36 AM   Result Value Ref Range    Lactic acid 3.8 (HH) 0.4 - 2.0 MMOL/L         Active Problems:    Sepsis ([address] (12/14/2017)      Hypotension (12/14/2017)      Anemia (12/14/2017)      STEMI (ST elevation myocardial infarction) ([facility] [address] (12/14/2017)      Septic shock ([facility] [address] (12/14/2017)          Assessment:    Condition: In serious condition. Unchanged. 1. Pulm - stable respiratory status thus far with only NC used   -monitor closely    2. CV - septic shock still present but pressor used decreased from 2 days ago   -wean pressors off as able    3. Renal - continued severe ARF with marked uremia despite makin some urine; HD catheter to be replaced this am    by vascular surgery for hopeful HD today   -HD per nephrology   -avoid nephrotoxic meds    4. GI - no acute issues; does eat but intake is marginal   -encourage po intake   -consider calorie count and/or nutrition eval to optimize nutrition intake    5. ID - cx's negative except urine (enterobacter 80k colonies)   -folllow cx's   -repeat tomorrow if febrile or if temp remains quite low    6. Heme - drop in H/H without clear source; likely entity (in part but not total) would be blood removed during trying to place,    adjust, and replace the HD catheters yesterday; being transfused today   -follow labs    7. Hepatic - failure worsening as demonstrated by increasing INR; whether this is an acute reaction to the sepsis or progressive    decline in hepatic function in unknown at this time   -follow labs closely    8.  Dispo - prognosis remains quite poor and family aware; he is DNR/DNI but if his status significantly deteriorates then conversation    will need to be held with family about possible comfort measures

## 2017-12-17 NOTE — PROGRESS NOTES
Progress Note    Patient: Rhonda Dee MRN: [id number]  CSN: [id number]    YOB: 1935  Age: [de-identified] y.o. Sex: male    DOA: 12/14/2017 LOS:  LOS: 3 days                    Subjective:    pt sleepy lethargic today on elenita hugger . Pt waiting for dialysis catheter    Hospital course   80 y. o. male with h/o type 2 Dm (last hgbA1c 5.9 on 11/26/17), cirrhosis, chronic renal failure admitted to [facility] on 12/14/17 with hypotension, hypothermia. Looking back at his history, Patient had ascites and liver mass but pathology negative. Patient had been transferred to a SNF for therapy. Patient had altered mental status on 12/13 and noted to be more lethargic on 12/14/17. Blood work was done and patient with  so sent to the ER. Patient was admitted for  anemia and evidence of possible sepsis with hypotension and hypothermia. Admitted to ICU started on pressors. Echo from 11/25/17 revealed mobile mitral valve vegetation. No further intervention was carried out at that time.       Of note, patient had paracentesis 11/29 which revealed 75 nucleated cells, no organisms.      Patient states that he has not been feeling good for the last few days. C/o abdominal pain. No headaches, cough, increased sob. Patient denies headaches, visual disturbances, sore throat, runny nose, earaches,  chills, abdominal pain, diarrhea, burning micturition, pain or weakness in extremities. He denies back pain/flank pain.      Chief Complaint:   Chief Complaint   Patient presents with    Lethargy       Review of systems  General: No fevers or chills. Cardiovascular: No chest pain or pressure. No palpitations. Pulmonary: positive shortness of breath, cough or wheeze. Gastrointestinal: No abdominal pain, nausea, vomiting or diarrhea. Genitourinary: No urinary frequency, urgency, hesitancy or dysuria.    Musculoskeletal: generalized weakness  Neurologic: No headache, numbness,  Generalized weakness     Objective: Physical Exam:  Visit Vitals    BP (!) 133/100    Pulse 69    Temp (!) 89.6 °F (32 °C)    Resp 18    Ht 5' 7\" (1.702 m)    Wt 58.8 kg (129 lb 10.1 oz)    SpO2 98%    BMI 20.3 kg/m2        General:         Alert, cooperative, no acute distress    HEENT: NC, Atraumatic. PERRLA, anicteric sclerae. Lungs: CTA Bilaterally. No Wheezing/Rhonchi/Rales. Heart:  Regular  rhythm,  No murmur, No Rubs, No Gallops  Abdomen: Soft, Non distended, Non tender.  +Bowel sounds, no HSM  Extremities:  lower limb edema  Psych:   Not anxious or agitated. Neurologic:  CN 2-12 grossly intact, Alert and  confused. No acute neurological                                 Deficits,     Intake and Output:  Current Shift:  12/17 0701 - 12/17 1900  In: 300   Out: -   Last three shifts:  12/15 1901 - 12/17 0700  In: 4260.6 [P.O.:150;  I.V.:4110.6]  Out: 720 [Urine:720]    Labs: Results:       Chemistry Recent Labs      12/17/17   0317  12/16/17   1515  12/16/17   0400  12/15/17   2200  12/15/17   0435   GLU  131*   --   90  84  123*   NA  141   --   141  140  142   K  4.0   --   4.2  4.4  4.8   CL  110*   --   109*  108  109*   CO2  13*   --   16*  14*  14*   BUN  198*   --   194*  179*  194*   CREA  3.95*   --   3.87*  3.87*  3.90*   CA  5.5*  6.1*  5.9*  6.0*  6.6*   AGAP  18   --   16  18  19*   BUCR  50*   --   50*  46*  50*   AP  344*   --   431*   --   533*   TP  3.5*   --   4.4*   --   4.6*   ALB  1.6*   --   2.1*   --   1.9*   GLOB  1.9*   --   2.3   --   2.7   AGRAT  0.8   --   0.9   --   0.7*      CBC w/Diff Recent Labs      12/17/17   0339  12/16/17   0400  12/15/17   2200  12/15/17   0435   WBC  7.1  8.1   --   3.4*   RBC  2.35*  3.35*   --   3.40*   HGB  7.6*  10.5*  10.6*  10.7*   HCT  20.9*  29.4*  29.4*  30.9*   PLT  49*  56*   --   82*   GRANS  74  85*   --   75   LYMPH  7*  1*   --   10*   EOS  0  0   --   0      Cardiac Enzymes Recent Labs      12/15/17   1500  12/15/17   0435   CPK  2299*  2203*   CKND1  2.5  3.3 Coagulation Recent Labs      12/17/17 0317  12/15/17   0455  12/14/17   1706   PTP  25.1*  20.9*  19.5*   INR  2.4*  1.9*  1.7*   APTT   --   37.3*  36.3       Lipid Panel Lab Results   Component Value Date/Time    Cholesterol, total 214 04/24/2017 10:15 AM    HDL Cholesterol 43 04/24/2017 10:15 AM    LDL, calculated 133.4 04/24/2017 10:15 AM    VLDL, calculated 37.6 04/24/2017 10:15 AM    Triglyceride 188 04/24/2017 10:15 AM    CHOL/HDL Ratio 5.0 04/24/2017 10:15 AM      BNP No results for input(s): BNPP in the last 72 hours.    Liver Enzymes Recent Labs      12/17/17 0317   TP  3.5*   ALB  1.6*   AP  344*   SGOT  147*      Thyroid Studies Lab Results   Component Value Date/Time    TSH 2.17 12/15/2017 04:35 AM          Procedures/imaging: see electronic medical records for all procedures/Xrays and details which were not copied into this note but were reviewed prior to creation of Plan    Medications:   Current Facility-Administered Medications   Medication Dose Route Frequency    0.9% sodium chloride infusion 250 mL  250 mL IntraVENous PRN    calcium gluconate 2 g in 0.9% sodium chloride 100 mL IVPB  2 g IntraVENous ONCE    0.9% sodium chloride infusion 250 mL  250 mL IntraVENous PRN    0.9% sodium chloride infusion 250 mL  250 mL IntraVENous PRN    sodium citrate 4 gram /100 mL (4 %) 0.08 g  2 mL Does Not Apply DIALYSIS PRN    [START ON 12/18/2017] calcitRIOL (ROCALTROL) capsule 0.5 mcg  0.5 mcg Oral DAILY    calcium acetate (PHOSLYRA) ORAL SOLUTION 2,001 mg  15 mL Oral TID WITH MEALS    0.9% sodium chloride infusion  100 mL/hr IntraVENous DIALYSIS PRN    alteplase (CATHFLO) 2 mg in sterile water (preservative free) 2 mL injection  2 mg InterCATHeter ONCE PRN    metroNIDAZOLE (FLAGYL) IVPB premix 500 mg  500 mg IntraVENous Q6H    cefTRIAXone (ROCEPHIN) 2 g in sterile water (preservative free) 20 mL IV syringe  2 g IntraVENous Q24H    sodium chloride (NS) flush 5-10 mL  5-10 mL IntraVENous PRN    0.9% sodium chloride infusion 250 mL  250 mL IntraVENous PRN    NOREPINephrine (LEVOPHED) 8 mg in 5% dextrose 250mL infusion  2-16 mcg/min IntraVENous TITRATE    VANCOMYCIN INFORMATION NOTE   Other Rx Dosing/Monitoring    insulin lispro (HUMALOG) injection   SubCUTAneous Q6H    glucose chewable tablet 16 g  4 Tab Oral PRN    glucagon (GLUCAGEN) injection 1 mg  1 mg IntraMUSCular PRN    dextrose (D50W) injection syrg 12.5-25 g  25-50 mL IntraVENous PRN    famotidine (PF) (PEPCID) 20 mg in sodium chloride 0.9 % 10 mL injection  20 mg IntraVENous DAILY       Assessment/Plan     Active Problems:    Sepsis ([facility] [address] (12/14/2017)      Hypotension (12/14/2017)      Anemia (12/14/2017)      STEMI (ST elevation myocardial infarction) ([facility] [address] (12/14/2017)      Septic shock ([facility]) (12/14/2017)    plan  Continue IV rocephin, vancomycin  Pressor prn  F/u ID  Repeat ct scan  Liver biopsy last admission negative for cancer   f/u vascular for dialysis catheter Start  Dialysis  Palliative care consult pending  Continue to monitor lab supportive acre Susy Prader, MD  12/17/2017 11:16  AM

## 2017-12-18 LAB
ABO + RH BLD: NORMAL
BLD PROD TYP BPU: NORMAL
BLOOD GROUP ANTIBODIES SERPL: NORMAL
BPU ID: NORMAL
CALLED TO:,BCALL1: NORMAL
CALLED TO:,BCALL1: NORMAL
CROSSMATCH RESULT,%XM: NORMAL
SPECIMEN EXP DATE BLD: NORMAL
STATUS OF UNIT,%ST: NORMAL
UNIT DIVISION, %UDIV: 0

## 2017-12-18 NOTE — PROCEDURES
[facility]  PROCEDURE NOTE    Morelia Bolden  MR#: [id number]  : 1935  ACCOUNT #: [de-identified]   DATE OF SERVICE: 2017    DATE OF PROCEDURE: 2017    PROCEDURE PERFORMED:  Dialysis catheter placement. INDICATIONS:  Patient is in acute renal failure requiring urgent dialysis. DESCRIPTION OF PROCEDURE:  Patient had his right lateral neck sterilized with chlorhexidine, which was allowed to dry. Sterile drape was then applied to maintain integrity of sterile field. His skin was anesthetized with topical lidocaine in the usual sterile fashion. Two attempts were made to access the right internal jugular vein. The jugular vein was accessed; however, blood flow was marginal.  The guidewire was advanced through the access needle; however, would not thread appropriately. After which time, the procedure was concluded and a small hematoma was noted on the side of the neck and pressure was applied for 5 minutes without any other abnormalities noted. The procedure was then changed to the right groin where his right groin was sterilized with chlorhexidine, which was again allowed to dry. A sterile drape was applied once again to maintain integrity of sterile field. In the usual sterile fashion, the skin and subcutaneous tissue were anesthetized with topical lidocaine without difficulty. Two attempts were made to access the right femoral vein. The right femoral vein was accessed, a guidewire was then threaded without difficulty. Access needle subsequently was removed. A small incision was made in the skin with the scalpel provided with the catheter kit. After which time, a dilator was then threaded over the wire into the subcutaneous tissue and into the right femoral vein. There was some difficulty getting in through the tissue itself; however, the vein was subsequently accessed with a dilator without difficulty.   The dilator was subsequently then removed with the guidewire still in place. A dialysis catheter was then threaded over the wire into the right femoral vein. The guidewire was subsequently removed and the catheter was sutured into position. All three ports were able to flush sufficiently without significant difficulty. However, upon inspection by the dialysis nurse, the nurse noted that the fluid was being removed without difficulty, the rate at which the blood could be removed with the syringe was insufficient for hemodialysis and therefore the dialysis catheter was then changed to a second catheter and several minor manipulations with directions were made with continued process of poor blood flow for dialysis purposes. All three ports did continue to flush well; however, not at a fast enough rate. The procedure subsequently was concluded and the catheter remained in place for reevaluation in the a.m. The patient otherwise tolerated the procedure well, had no immediate complications noted.       MD DELMIS Lepe / RACHELL  D: 12/17/2017 12:57     T: 12/18/2017 14:39  JOB #: [id number]

## 2017-12-18 NOTE — PROGRESS NOTES
[address] Pt  peacefully. DNR code status.  Notified , house MD, ICU PA, admitting MD and family  194 family at bedside with

## 2017-12-18 NOTE — PROGRESS NOTES
Bereavement Note:     responded to the death of Carissa Lewis, who is a [de-identified] y.o., male, offering Spiritual Care to patient and family, see flow sheets for interventions. Sister and others arrived. Date of Death: 17  Time of Death:     Extended Emergency Contact Information  Primary Emergency Contact: [address] Phone: [phone]  Relation: Other Relative  Secondary Emergency Contact: [address]  Mobile Phone: [phone]  Relation: Other Relative                 YES      NO  UNKNOWN  Life [employer]   []        [x]    []   [employer]   [] [x] []   Medical Examiner  []        [x]  []   Going to [place]  [x]        [] []      Autopsy   []        [x]         []   Sympathy Card  []        [x]  Bereavement Materials  []        [x]           Business Card Provided  []        [x]              Home: Mikaela Jensen will continue to follow family and will provide spiritual care as needed.      Chaplain Jonathan Brewer

## 2017-12-18 NOTE — DISCHARGE SUMMARY
Discharge Summary     Patient: Dorothy Valdes MRN: [id number]  SSN: [id number]    YOB: 1935  Age: [de-identified] y.o. Sex: male       Admit Date: 2017    Discharge Date: 2017      Admission Diagnoses: Septic shock ([facility]  Anemia  STEMI (ST elevation myocardial infarction) [facility])    Discharge Diagnoses:   Problem List as of 2017  Date Reviewed: 2017          Codes Class Noted - Resolved    Sepsis ([place] [address] ICD-10-CM: A41.9  ICD-9-CM: 038.9, 995.91  2017 - Present        Hypotension ICD-10-CM: I95.9  ICD-9-CM: 458.9  2017 - Present        Anemia ICD-10-CM: D64.9  ICD-9-CM: 285.9  2017 - Present        STEMI (ST elevation myocardial infarction) ([place] [address] ICD-10-CM: I21.3  ICD-9-CM: 410.90  2017 - Present        Septic shock ([place] [address] ICD-10-CM: A41.9, R65.21  ICD-9-CM: 038.9, 785.52, 995.92  2017 - Present        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 ([place] [address].) ICD-10-CM: I50.43  ICD-9-CM: 428.43, 428.0  2017 - Present        Liver mass ICD-10-CM: R16.0  ICD-9-CM: 573.9  2017 - Present        Heart failure ([facility] ICD-10-CM: I50.9  ICD-9-CM: 428.9  2017 - Present        Chest pain ICD-10-CM: R07.9  ICD-9-CM: 786.50  2017 - Present        Chest pain at rest ICD-10-CM: R07.9  ICD-9-CM: 786.50  2017 - Present        Hypertension ICD-10-CM: I10  ICD-9-CM: 401.9  2017 - Present        CKD (chronic kidney disease) stage 3, GFR 30-59 ml/min ICD-10-CM: N18.3  ICD-9-CM: 585.3  2017 - Present               Discharge Condition:     Hospital Course:81 y. o. male with h/o type 2 Dm (last hgbA1c 5.9 on 17), cirrhosis, chronic renal failure admitted to [facility] on 17 with hypotension, hypothermia.    Looking back at his history, Patient had ascites and liver mass but pathology negative. Patient had been transferred to a SNF for therapy.  Patient had altered mental status on  and noted to be more lethargic on 17. Blood work was done and patient with  so sent to the ER. Patient was admitted for [facility] and evidence of possible sepsis with hypotension and hypothermia. Admitted to ICU started on pressors.       Echo from 17 revealed mobile mitral valve vegetation. No further intervention was carried out at that time.       Of note, patient had paracentesis  which revealed 75 nucleated cells, no organisms. Pt has been lethargic today seen by vascular for dialysis cathter pt hd dialysis today pt couldn't tolerate dialysis bp dropped down pt had DNR order in the chart .  Pt   18:21    Consults: Gastroenterology, Nephrology and Pulmonary/Critical Care         Signed By: Sonam Myles MD     2017

## 2017-12-19 LAB — HBV E AB SERPL QL IA: POSITIVE

## 2017-12-19 NOTE — OP NOTES
[facility]  OPERATIVE REPORT    Cristhian Rodrigues  MR#: [id number]  : 1935  ACCOUNT #: [de-identified]   DATE OF SERVICE: 2017    PREOPERATIVE DIAGNOSIS:  Endstage renal disease. POSTOPERATIVE DIAGNOSIS:  Endstage renal disease. PROCEDURE PERFORMED:  Extended exchange of right femoral temporary dialysis catheter over a wire. SURGEON:  Ray Orozco MD.     ANESTHESIA:  None. PACKS, DRAINS AND IMPLANTS:  A 24 cm temporary dialysis catheter. ESTIMATED BLOOD LOSS:  Minimal.    SPECIMENS REMOVED:  None. COMPLICATIONS:  None. CONDITION AFTER PROCEDURE:  Critical.    FINDINGS:  Good blood return, no evidence of flushing, and catheter was dressed in sterile fashion. INDICATIONS FOR PROCEDURE:  The patient is an 82-year-old gentleman with endstage renal disease and presumed sepsis with a primary femoral catheter placed today prior by a different physician. His catheter was not functioning appropriately so consult was placed for an exchange. Informed consent was obtained. PROCEDURE IN DETAIL:  On 2017, the patient was identified by name, date of birth by myself and the nurse. The patient was prepped and draped and timeout performed. At this point, we advanced a Bentson wire through the previous catheter into the inferior vena cava. We then removed the previous catheter and advanced a 24 cm temporary dialysis catheter. We secured the catheter to the thigh using nylon suture. He had good blood return. No evidence of flushing. We flushed the catheter clear and dressed in standard sterile fashion at the end of the procedure. I was present and scrubbed for the entire procedure.       MD Jesus Howell  D: 2017 18:54     T: 2017 06:53  JOB #: [id number]

## 2017-12-20 LAB
BACTERIA SPEC CULT: NORMAL
BACTERIA SPEC CULT: NORMAL
SERVICE CMNT-IMP: NORMAL
SERVICE CMNT-IMP: NORMAL